# Patient Record
Sex: FEMALE | Race: WHITE | NOT HISPANIC OR LATINO | Employment: OTHER | URBAN - NONMETROPOLITAN AREA
[De-identification: names, ages, dates, MRNs, and addresses within clinical notes are randomized per-mention and may not be internally consistent; named-entity substitution may affect disease eponyms.]

---

## 2021-03-12 ENCOUNTER — APPOINTMENT (OUTPATIENT)
Dept: CT IMAGING | Facility: HOSPITAL | Age: 76
End: 2021-03-12

## 2021-03-12 ENCOUNTER — APPOINTMENT (OUTPATIENT)
Dept: GENERAL RADIOLOGY | Facility: HOSPITAL | Age: 76
End: 2021-03-12

## 2021-03-12 ENCOUNTER — HOSPITAL ENCOUNTER (INPATIENT)
Facility: HOSPITAL | Age: 76
LOS: 7 days | Discharge: NURSING, REHAB OR LONG-TERM CARE | End: 2021-03-19
Attending: EMERGENCY MEDICINE | Admitting: INTERNAL MEDICINE

## 2021-03-12 ENCOUNTER — APPOINTMENT (OUTPATIENT)
Dept: MRI IMAGING | Facility: HOSPITAL | Age: 76
End: 2021-03-12

## 2021-03-12 DIAGNOSIS — I63.9 CEREBROVASCULAR ACCIDENT (CVA), UNSPECIFIED MECHANISM (HCC): Primary | ICD-10-CM

## 2021-03-12 DIAGNOSIS — R13.12 OROPHARYNGEAL DYSPHAGIA: ICD-10-CM

## 2021-03-12 DIAGNOSIS — Z74.09 IMPAIRED MOBILITY AND ADLS: ICD-10-CM

## 2021-03-12 DIAGNOSIS — Z78.9 IMPAIRED MOBILITY AND ADLS: ICD-10-CM

## 2021-03-12 PROBLEM — E11.9 DM2 (DIABETES MELLITUS, TYPE 2) (HCC): Status: ACTIVE | Noted: 2021-03-12

## 2021-03-12 PROBLEM — E78.5 HYPERLIPIDEMIA: Status: ACTIVE | Noted: 2021-03-12

## 2021-03-12 PROBLEM — I10 BENIGN ESSENTIAL HTN: Status: ACTIVE | Noted: 2021-03-12

## 2021-03-12 PROBLEM — R41.82 ALTERED MENTAL STATUS: Status: ACTIVE | Noted: 2021-03-12

## 2021-03-12 LAB
ALBUMIN SERPL-MCNC: 2.9 G/DL (ref 3.5–5.2)
ALBUMIN/GLOB SERPL: 0.7 G/DL
ALP SERPL-CCNC: 107 U/L (ref 39–117)
ALT SERPL W P-5'-P-CCNC: 39 U/L (ref 1–33)
ANION GAP SERPL CALCULATED.3IONS-SCNC: 9 MMOL/L (ref 5–15)
APTT PPP: 39.4 SECONDS (ref 24.1–35)
AST SERPL-CCNC: 33 U/L (ref 1–32)
BACTERIA UR QL AUTO: ABNORMAL /HPF
BASOPHILS # BLD AUTO: 0.05 10*3/MM3 (ref 0–0.2)
BASOPHILS NFR BLD AUTO: 0.4 % (ref 0–1.5)
BILIRUB SERPL-MCNC: 0.4 MG/DL (ref 0–1.2)
BILIRUB UR QL STRIP: NEGATIVE
BUN SERPL-MCNC: 29 MG/DL (ref 8–23)
BUN/CREAT SERPL: 27.4 (ref 7–25)
CALCIUM SPEC-SCNC: 9.2 MG/DL (ref 8.6–10.5)
CHLORIDE SERPL-SCNC: 100 MMOL/L (ref 98–107)
CLARITY UR: CLEAR
CO2 SERPL-SCNC: 28 MMOL/L (ref 22–29)
COLOR UR: ABNORMAL
CREAT SERPL-MCNC: 1.06 MG/DL (ref 0.57–1)
D-LACTATE SERPL-SCNC: 2 MMOL/L (ref 0.5–2)
DEPRECATED RDW RBC AUTO: 43.8 FL (ref 37–54)
EOSINOPHIL # BLD AUTO: 0.12 10*3/MM3 (ref 0–0.4)
EOSINOPHIL NFR BLD AUTO: 1 % (ref 0.3–6.2)
ERYTHROCYTE [DISTWIDTH] IN BLOOD BY AUTOMATED COUNT: 13.5 % (ref 12.3–15.4)
GFR SERPL CREATININE-BSD FRML MDRD: 50 ML/MIN/1.73
GLOBULIN UR ELPH-MCNC: 4 GM/DL
GLUCOSE BLDC GLUCOMTR-MCNC: 270 MG/DL (ref 70–130)
GLUCOSE BLDC GLUCOMTR-MCNC: 382 MG/DL (ref 70–130)
GLUCOSE SERPL-MCNC: 335 MG/DL (ref 65–99)
GLUCOSE UR STRIP-MCNC: ABNORMAL MG/DL
HCT VFR BLD AUTO: 40.6 % (ref 34–46.6)
HGB BLD-MCNC: 12.9 G/DL (ref 12–15.9)
HGB UR QL STRIP.AUTO: NEGATIVE
HYALINE CASTS UR QL AUTO: ABNORMAL /LPF
IMM GRANULOCYTES # BLD AUTO: 0.2 10*3/MM3 (ref 0–0.05)
IMM GRANULOCYTES NFR BLD AUTO: 1.7 % (ref 0–0.5)
INR PPP: 1.06 (ref 0.91–1.09)
KETONES UR QL STRIP: NEGATIVE
LEUKOCYTE ESTERASE UR QL STRIP.AUTO: ABNORMAL
LYMPHOCYTES # BLD AUTO: 1.33 10*3/MM3 (ref 0.7–3.1)
LYMPHOCYTES NFR BLD AUTO: 11.3 % (ref 19.6–45.3)
MAGNESIUM SERPL-MCNC: 2.2 MG/DL (ref 1.6–2.4)
MCH RBC QN AUTO: 27.9 PG (ref 26.6–33)
MCHC RBC AUTO-ENTMCNC: 31.8 G/DL (ref 31.5–35.7)
MCV RBC AUTO: 87.9 FL (ref 79–97)
MONOCYTES # BLD AUTO: 0.7 10*3/MM3 (ref 0.1–0.9)
MONOCYTES NFR BLD AUTO: 6 % (ref 5–12)
NEUTROPHILS NFR BLD AUTO: 79.6 % (ref 42.7–76)
NEUTROPHILS NFR BLD AUTO: 9.33 10*3/MM3 (ref 1.7–7)
NITRITE UR QL STRIP: NEGATIVE
NRBC BLD AUTO-RTO: 0 /100 WBC (ref 0–0.2)
PH UR STRIP.AUTO: 5.5 [PH] (ref 5–8)
PLATELET # BLD AUTO: 349 10*3/MM3 (ref 140–450)
PMV BLD AUTO: 11.4 FL (ref 6–12)
POTASSIUM SERPL-SCNC: 5.2 MMOL/L (ref 3.5–5.2)
PROT SERPL-MCNC: 6.9 G/DL (ref 6–8.5)
PROT UR QL STRIP: ABNORMAL
PROTHROMBIN TIME: 13.4 SECONDS (ref 11.9–14.6)
RBC # BLD AUTO: 4.62 10*6/MM3 (ref 3.77–5.28)
RBC # UR: ABNORMAL /HPF
REF LAB TEST METHOD: ABNORMAL
SARS-COV-2 RNA PNL SPEC NAA+PROBE: NOT DETECTED
SODIUM SERPL-SCNC: 137 MMOL/L (ref 136–145)
SP GR UR STRIP: 1.02 (ref 1–1.03)
SQUAMOUS #/AREA URNS HPF: ABNORMAL /HPF
UROBILINOGEN UR QL STRIP: ABNORMAL
WBC # BLD AUTO: 11.73 10*3/MM3 (ref 3.4–10.8)
WBC UR QL AUTO: ABNORMAL /HPF

## 2021-03-12 PROCEDURE — 74018 RADEX ABDOMEN 1 VIEW: CPT

## 2021-03-12 PROCEDURE — P9612 CATHETERIZE FOR URINE SPEC: HCPCS

## 2021-03-12 PROCEDURE — 73060 X-RAY EXAM OF HUMERUS: CPT

## 2021-03-12 PROCEDURE — 70450 CT HEAD/BRAIN W/O DYE: CPT

## 2021-03-12 PROCEDURE — 93010 ELECTROCARDIOGRAM REPORT: CPT | Performed by: INTERNAL MEDICINE

## 2021-03-12 PROCEDURE — 83735 ASSAY OF MAGNESIUM: CPT | Performed by: EMERGENCY MEDICINE

## 2021-03-12 PROCEDURE — 83605 ASSAY OF LACTIC ACID: CPT | Performed by: EMERGENCY MEDICINE

## 2021-03-12 PROCEDURE — 87150 DNA/RNA AMPLIFIED PROBE: CPT | Performed by: EMERGENCY MEDICINE

## 2021-03-12 PROCEDURE — 87635 SARS-COV-2 COVID-19 AMP PRB: CPT | Performed by: EMERGENCY MEDICINE

## 2021-03-12 PROCEDURE — 73090 X-RAY EXAM OF FOREARM: CPT

## 2021-03-12 PROCEDURE — 70551 MRI BRAIN STEM W/O DYE: CPT

## 2021-03-12 PROCEDURE — 71045 X-RAY EXAM CHEST 1 VIEW: CPT

## 2021-03-12 PROCEDURE — 85025 COMPLETE CBC W/AUTO DIFF WBC: CPT | Performed by: EMERGENCY MEDICINE

## 2021-03-12 PROCEDURE — 99285 EMERGENCY DEPT VISIT HI MDM: CPT

## 2021-03-12 PROCEDURE — 80053 COMPREHEN METABOLIC PANEL: CPT | Performed by: EMERGENCY MEDICINE

## 2021-03-12 PROCEDURE — 63710000001 INSULIN DETEMIR PER 5 UNITS: Performed by: INTERNAL MEDICINE

## 2021-03-12 PROCEDURE — 36415 COLL VENOUS BLD VENIPUNCTURE: CPT

## 2021-03-12 PROCEDURE — 85730 THROMBOPLASTIN TIME PARTIAL: CPT | Performed by: EMERGENCY MEDICINE

## 2021-03-12 PROCEDURE — 87147 CULTURE TYPE IMMUNOLOGIC: CPT | Performed by: EMERGENCY MEDICINE

## 2021-03-12 PROCEDURE — 81001 URINALYSIS AUTO W/SCOPE: CPT | Performed by: EMERGENCY MEDICINE

## 2021-03-12 PROCEDURE — 87077 CULTURE AEROBIC IDENTIFY: CPT | Performed by: EMERGENCY MEDICINE

## 2021-03-12 PROCEDURE — 87040 BLOOD CULTURE FOR BACTERIA: CPT | Performed by: EMERGENCY MEDICINE

## 2021-03-12 PROCEDURE — 85610 PROTHROMBIN TIME: CPT | Performed by: EMERGENCY MEDICINE

## 2021-03-12 PROCEDURE — 93005 ELECTROCARDIOGRAM TRACING: CPT | Performed by: EMERGENCY MEDICINE

## 2021-03-12 PROCEDURE — 82962 GLUCOSE BLOOD TEST: CPT

## 2021-03-12 PROCEDURE — 63710000001 INSULIN REGULAR HUMAN PER 5 UNITS: Performed by: INTERNAL MEDICINE

## 2021-03-12 RX ORDER — SODIUM CHLORIDE 0.9 % (FLUSH) 0.9 %
10 SYRINGE (ML) INJECTION AS NEEDED
Status: DISCONTINUED | OUTPATIENT
Start: 2021-03-12 | End: 2021-03-19 | Stop reason: HOSPADM

## 2021-03-12 RX ORDER — SODIUM CHLORIDE 0.9 % (FLUSH) 0.9 %
10 SYRINGE (ML) INJECTION EVERY 12 HOURS SCHEDULED
Status: DISCONTINUED | OUTPATIENT
Start: 2021-03-12 | End: 2021-03-19 | Stop reason: HOSPADM

## 2021-03-12 RX ORDER — DEXTROSE MONOHYDRATE 25 G/50ML
25 INJECTION, SOLUTION INTRAVENOUS
Status: DISCONTINUED | OUTPATIENT
Start: 2021-03-12 | End: 2021-03-19 | Stop reason: HOSPADM

## 2021-03-12 RX ORDER — ONDANSETRON 2 MG/ML
4 INJECTION INTRAMUSCULAR; INTRAVENOUS EVERY 6 HOURS PRN
Status: DISCONTINUED | OUTPATIENT
Start: 2021-03-12 | End: 2021-03-19 | Stop reason: HOSPADM

## 2021-03-12 RX ORDER — NICOTINE POLACRILEX 4 MG
15 LOZENGE BUCCAL
Status: DISCONTINUED | OUTPATIENT
Start: 2021-03-12 | End: 2021-03-19 | Stop reason: HOSPADM

## 2021-03-12 RX ORDER — ACETAMINOPHEN 325 MG/1
650 TABLET ORAL EVERY 4 HOURS PRN
Status: DISCONTINUED | OUTPATIENT
Start: 2021-03-12 | End: 2021-03-19 | Stop reason: HOSPADM

## 2021-03-12 RX ORDER — SODIUM CHLORIDE 9 MG/ML
75 INJECTION, SOLUTION INTRAVENOUS CONTINUOUS
Status: DISCONTINUED | OUTPATIENT
Start: 2021-03-12 | End: 2021-03-14

## 2021-03-12 RX ORDER — LABETALOL HYDROCHLORIDE 5 MG/ML
20 INJECTION, SOLUTION INTRAVENOUS EVERY 4 HOURS PRN
Status: DISCONTINUED | OUTPATIENT
Start: 2021-03-12 | End: 2021-03-19 | Stop reason: HOSPADM

## 2021-03-12 RX ORDER — ACETAMINOPHEN 650 MG/1
650 SUPPOSITORY RECTAL EVERY 4 HOURS PRN
Status: DISCONTINUED | OUTPATIENT
Start: 2021-03-12 | End: 2021-03-19 | Stop reason: HOSPADM

## 2021-03-12 RX ORDER — ATORVASTATIN CALCIUM 40 MG/1
80 TABLET, FILM COATED ORAL NIGHTLY
Status: DISCONTINUED | OUTPATIENT
Start: 2021-03-12 | End: 2021-03-15

## 2021-03-12 RX ORDER — HYDRALAZINE HYDROCHLORIDE 20 MG/ML
5 INJECTION INTRAMUSCULAR; INTRAVENOUS EVERY 6 HOURS PRN
Status: DISCONTINUED | OUTPATIENT
Start: 2021-03-12 | End: 2021-03-19 | Stop reason: HOSPADM

## 2021-03-12 RX ADMIN — HUMAN INSULIN 7 UNITS: 100 INJECTION, SOLUTION SUBCUTANEOUS at 19:09

## 2021-03-12 RX ADMIN — LABETALOL HYDROCHLORIDE 20 MG: 5 INJECTION, SOLUTION INTRAVENOUS at 19:11

## 2021-03-12 RX ADMIN — INSULIN DETEMIR 10 UNITS: 100 INJECTION, SOLUTION SUBCUTANEOUS at 22:39

## 2021-03-12 RX ADMIN — ACETAMINOPHEN 650 MG: 650 SUPPOSITORY RECTAL at 22:59

## 2021-03-12 RX ADMIN — SODIUM CHLORIDE, PRESERVATIVE FREE 10 ML: 5 INJECTION INTRAVENOUS at 22:41

## 2021-03-12 RX ADMIN — SODIUM CHLORIDE 75 ML/HR: 9 INJECTION, SOLUTION INTRAVENOUS at 19:13

## 2021-03-12 NOTE — ED PROVIDER NOTES
Subjective   Information [facility] EMS [facility]  Patient is unable to give us any information because of her mental status change and acuity of condition.  Reportedly the patient was found at about noon with a right-sided paralysis and not talking well and unable to get around well.  They are not sure exactly when it started.  They tell us now that the last time she was known to be her normal self was sometime last night.  She was resubmitted to the nursing home on 3 9 of 21 from [facility] after diagnosis of CVA was there for rehab.  However these changes are new according to the them.  They sent here for further evaluation.  We do not know what residual deficit she had after the previous stroke.  We do not know if she had TPA for the stroke because we do not have those records at this time.      History provided by:  Nursing home and EMS personnel  History limited by:  Mental status change   used: No    Neurologic Problem  The patient's primary symptoms include an altered mental status, focal weakness and weakness. This is a new problem. The current episode started today. The neurological problem developed suddenly. The last time the patient was known to be well was 3/11/2021 8:00 PM.  The problem is unchanged. There was right-sided and upper extremity focality noted. Pertinent negatives include no abdominal pain, auditory change, aura, back pain, bladder incontinence, bowel incontinence, chest pain, confusion, diaphoresis, dizziness, fatigue, fever, headaches, light-headedness, nausea, neck pain, palpitations, shortness of breath, vertigo or vomiting. Past treatments include nothing. Her past medical history is significant for a CVA and dementia. There is no history of a bleeding disorder, a clotting disorder, head trauma, liver disease, mood changes or seizures.       Review of Systems   Constitutional: Negative for diaphoresis, fatigue and fever.   HENT:  Negative.    Respiratory: Negative.  Negative for shortness of breath.    Cardiovascular: Negative.  Negative for chest pain and palpitations.   Gastrointestinal: Negative.  Negative for abdominal pain, bowel incontinence, nausea and vomiting.   Genitourinary: Negative.  Negative for bladder incontinence.   Musculoskeletal: Negative.  Negative for back pain and neck pain.   Skin: Negative.    Neurological: Positive for focal weakness and weakness. Negative for dizziness, vertigo, light-headedness and headaches.   Psychiatric/Behavioral: Positive for decreased concentration. Negative for confusion.   All other systems reviewed and are negative.      Past Medical History:   Diagnosis Date   • Altered mental status    • Alzheimer's disease (CMS/[facility])    • Cerebral infarction (CMS/[facility])    • Dementia in other diseases classified elsewhere without behavioral disturbance (CMS/[facility])    • Gastro-esophageal reflux disease without esophagitis    • Hemiplegia and hemiparesis following cerebral infarction affecting right dominant side (CMS/[facility])    • Hyperkalemia    • Long term (current) use of insulin (CMS/[facility])    • Other recurrent depressive disorders (CMS/[facility])    • Other specified hypothyroidism    • Pain, unspecified    • Type 2 diabetes mellitus with hyperglycemia (CMS/[facility])        Allergies   Allergen Reactions   • Sulfa Antibiotics Unknown - High Severity   • Tetanus Toxoids Unknown - High Severity       History reviewed. No pertinent surgical history.    History reviewed. No pertinent family history.    Social History     Socioeconomic History   • Marital status:      Spouse name: Not on file   • Number of children: Not on file   • Years of education: Not on file   • Highest education level: Not on file   Tobacco Use   • Smoking status: Unknown If Ever Smoked   Substance and Sexual Activity   • Alcohol use: Not Currently   • Drug use: Not Currently   • Sexual activity: Defer       Prior to Admission medications    Not  on File       Medications   sodium chloride 0.9 % flush 10 mL (has no administration in time range)       Vitals:    03/12/21 1522   BP:    Pulse:    Resp:    Temp: 98.7 °F (37.1 °C)   SpO2:          Objective   Physical Exam  Vitals and nursing note reviewed.   Constitutional:       Appearance: Normal appearance. She is obese.   HENT:      Head: Normocephalic and atraumatic.      Nose: Nose normal.      Mouth/Throat:      Mouth: Mucous membranes are moist.      Pharynx: Oropharynx is clear.   Eyes:      Pupils: Pupils are equal, round, and reactive to light.      Comments: Eyes deviated to the left and the patient does not move them with exam.   Cardiovascular:      Rate and Rhythm: Normal rate and regular rhythm.   Pulmonary:      Effort: Pulmonary effort is normal.      Breath sounds: Normal breath sounds.   Abdominal:      General: Abdomen is flat.      Palpations: Abdomen is soft.   Musculoskeletal:         General: Normal range of motion.      Cervical back: Normal range of motion and neck supple.   Skin:     General: Skin is warm and dry.      Capillary Refill: Capillary refill takes less than 2 seconds.   Neurological:      Mental Status: She is disoriented.      Comments: Patient is flaccid in her right upper extremity.  I do not know if this is old or new.  She does keep her eyes deviating to the left.  She is not combative but does not cooperate with exam.   Psychiatric:         Mood and Affect: Mood normal.         Behavior: Behavior normal.         Procedures         Lab Results (last 24 hours)     Procedure Component Value Units Date/Time    CBC & Differential [[id number]]  (Abnormal) Collected: 03/12/21 1410    Specimen: Blood Updated: 03/12/21 1445    Narrative:      The following orders were created for panel order CBC & Differential.  Procedure                               Abnormality         Status                     ---------                               -----------         ------                      CBC Auto Differential[[id number]]        Abnormal            Final result                 Please view results for these tests on the individual orders.    Comprehensive Metabolic Panel [[id number]]  (Abnormal) Collected: 03/12/21 1410    Specimen: Blood Updated: 03/12/21 1505     Glucose 335 mg/dL      BUN 29 mg/dL      Creatinine 1.06 mg/dL      Sodium 137 mmol/L      Potassium 5.2 mmol/L      Chloride 100 mmol/L      CO2 28.0 mmol/L      Calcium 9.2 mg/dL      Total Protein 6.9 g/dL      Albumin 2.90 g/dL      ALT (SGPT) 39 U/L      AST (SGOT) 33 U/L      Alkaline Phosphatase 107 U/L      Total Bilirubin 0.4 mg/dL      eGFR Non African Amer 50 mL/min/1.73      Globulin 4.0 gm/dL      A/G Ratio 0.7 g/dL      BUN/Creatinine Ratio 27.4     Anion Gap 9.0 mmol/L     Narrative:      GFR Normal >60  Chronic Kidney Disease <60  Kidney Failure <15      aPTT [[id number]]  (Abnormal) Collected: 03/12/21 1410    Specimen: Blood Updated: 03/12/21 1455     PTT 39.4 seconds     Protime-INR [[id number]]  (Normal) Collected: 03/12/21 1410    Specimen: Blood Updated: 03/12/21 1455     Protime 13.4 Seconds      INR 1.06    Magnesium [[id number]]  (Normal) Collected: 03/12/21 1410    Specimen: Blood Updated: 03/12/21 1500     Magnesium 2.2 mg/dL     CBC Auto Differential [[id number]]  (Abnormal) Collected: 03/12/21 1410    Specimen: Blood Updated: 03/12/21 1445     WBC 11.73 10*3/mm3      RBC 4.62 10*6/mm3      Hemoglobin 12.9 g/dL      Hematocrit 40.6 %      MCV 87.9 fL      MCH 27.9 pg      MCHC 31.8 g/dL      RDW 13.5 %      RDW-SD 43.8 fl      MPV 11.4 fL      Platelets 349 10*3/mm3      Neutrophil % 79.6 %      Lymphocyte % 11.3 %      Monocyte % 6.0 %      Eosinophil % 1.0 %      Basophil % 0.4 %      Immature Grans % 1.7 %      Neutrophils, Absolute 9.33 10*3/mm3      Lymphocytes, Absolute 1.33 10*3/mm3      Monocytes, Absolute 0.70 10*3/mm3      Eosinophils, Absolute 0.12 10*3/mm3      Basophils, Absolute 0.05 10*3/mm3       Immature Grans, Absolute 0.20 10*3/mm3      nRBC 0.0 /100 WBC     Blood Culture - Blood, Arm, Left [[id number]] Collected: 03/12/21 1414    Specimen: Blood from Arm, Left Updated: 03/12/21 1451    Lactic Acid, Plasma [[id number]]  (Normal) Collected: 03/12/21 1432    Specimen: Blood Updated: 03/12/21 1501     Lactate 2.0 mmol/L     Urinalysis With Culture If Indicated - Urine, Catheter In/Out [[id number]]  (Abnormal) Collected: 03/12/21 1433    Specimen: Urine, Catheter In/Out Updated: 03/12/21 1447     Color, UA Dark Yellow     Appearance, UA Clear     pH, UA 5.5     Specific Gravity, UA 1.024     Glucose,  mg/dL (1+)     Ketones, UA Negative     Bilirubin, UA Negative     Blood, UA Negative     Protein, UA 30 mg/dL (1+)     Leuk Esterase, UA Trace     Nitrite, UA Negative     Urobilinogen, UA 1.0 E.U./dL    Urinalysis, Microscopic Only - Urine, Catheter In/Out [[id number]]  (Abnormal) Collected: 03/12/21 1433    Specimen: Urine, Catheter In/Out Updated: 03/12/21 1447     RBC, UA 3-5 /HPF      WBC, UA 3-5 /HPF      Bacteria, UA None Seen /HPF      Squamous Epithelial Cells, UA 0-2 /HPF      Hyaline Casts, UA 3-6 /LPF      Methodology Automated Microscopy    Blood Culture - Blood, Arm, Right [[id number]] Collected: 03/12/21 1434    Specimen: Blood from Arm, Right Updated: 03/12/21 1451          XR Forearm 2 View Left   Final Result   1. Nonspecific soft tissue swelling along the more proximal aspect of   the dorsal forearm. No underlying bony abnormality.   2. 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       This report was finalized on 03/12/2021 15:45 by Dr Vinod Conner, .      XR Humerus Left   Final Result   1. Left shoulder arthroplasty. No hardware complication. No acute   fracture.   2. 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       This report was finalized on 03/12/2021 15:44 by Dr Vinod Conner, .      XR Chest 1 View   Final Result   1. No radiographic evidence of acute cardiopulmonary  process.           This report was finalized on 03/12/2021 15:39 by Dr Vinod Conner, .      CT Head Without Contrast   Final Result   Impression:       1.  No acute intracranial findings.   2.  Chronic microvascular ischemic white matter change and old appearing   LEFT caudate lacunar infarct.   3.  Recommend brain MRI if there is clinical concern for acute ischemia.   4.  Small mastoid effusions.   This report was finalized on 03/12/2021 15:45 by Dr. Rogerio Jaimes MD.      MRI Brain Without Contrast    (Results Pending)       ED Course  ED Course as of Mar 12 1627   Fri Mar 12, 2021   1626 Patient CT is normal with the patient does have a marked change in her status according to the report from the nursing home.  Accordingly we will admit.  I have ordered an MRI.  I have spoken with Dr. Burgos.    [TR]      ED Course User Index  [TR] Jorje Dawn Jr., MD          MDM  Number of Diagnoses or Management Options  Cerebrovascular accident (CVA), unspecified mechanism (CMS/HCC): new and requires workup     Amount and/or Complexity of Data Reviewed  Clinical lab tests: ordered and reviewed  Tests in the radiology section of CPT®: ordered and reviewed  Tests in the medicine section of CPT®: ordered and reviewed    Risk of Complications, Morbidity, and/or Mortality  Presenting problems: moderate  Diagnostic procedures: moderate  Management options: moderate    Patient Progress  Patient progress: stable      Final diagnoses:   Cerebrovascular accident (CVA), unspecified mechanism (CMS/HCC)          Jorje Dawn Jr., MD  03/12/21 1627

## 2021-03-13 ENCOUNTER — APPOINTMENT (OUTPATIENT)
Dept: CARDIOLOGY | Facility: HOSPITAL | Age: 76
End: 2021-03-13

## 2021-03-13 LAB
ALBUMIN SERPL-MCNC: 2.6 G/DL (ref 3.5–5.2)
ALBUMIN/GLOB SERPL: 0.7 G/DL
ALP SERPL-CCNC: 92 U/L (ref 39–117)
ALT SERPL W P-5'-P-CCNC: 33 U/L (ref 1–33)
ANION GAP SERPL CALCULATED.3IONS-SCNC: 10 MMOL/L (ref 5–15)
AST SERPL-CCNC: 22 U/L (ref 1–32)
BH CV ECHO MEAS - AO MAX PG (FULL): 1.3 MMHG
BH CV ECHO MEAS - AO MAX PG: 6.1 MMHG
BH CV ECHO MEAS - AO MEAN PG (FULL): 2 MMHG
BH CV ECHO MEAS - AO MEAN PG: 4 MMHG
BH CV ECHO MEAS - AO ROOT AREA (BSA CORRECTED): 1.8
BH CV ECHO MEAS - AO ROOT AREA: 10.8 CM^2
BH CV ECHO MEAS - AO ROOT DIAM: 3.7 CM
BH CV ECHO MEAS - AO V2 MAX: 123 CM/SEC
BH CV ECHO MEAS - AO V2 MEAN: 92 CM/SEC
BH CV ECHO MEAS - AO V2 VTI: 24.2 CM
BH CV ECHO MEAS - AVA(I,A): 2.5 CM^2
BH CV ECHO MEAS - AVA(I,D): 2.5 CM^2
BH CV ECHO MEAS - AVA(V,A): 3.4 CM^2
BH CV ECHO MEAS - AVA(V,D): 3.4 CM^2
BH CV ECHO MEAS - BSA(HAYCOCK): 2.2 M^2
BH CV ECHO MEAS - BSA: 2.1 M^2
BH CV ECHO MEAS - BZI_BMI: 32.2 KILOGRAMS/M^2
BH CV ECHO MEAS - BZI_METRIC_HEIGHT: 172.7 CM
BH CV ECHO MEAS - BZI_METRIC_WEIGHT: 96.2 KG
BH CV ECHO MEAS - EDV(CUBED): 82.3 ML
BH CV ECHO MEAS - EDV(MOD-SP4): 95.6 ML
BH CV ECHO MEAS - EDV(TEICH): 85.4 ML
BH CV ECHO MEAS - EF(CUBED): 88.4 %
BH CV ECHO MEAS - EF(MOD-SP4): 78 %
BH CV ECHO MEAS - EF(TEICH): 82.7 %
BH CV ECHO MEAS - ESV(CUBED): 9.5 ML
BH CV ECHO MEAS - ESV(MOD-SP4): 21 ML
BH CV ECHO MEAS - ESV(TEICH): 14.8 ML
BH CV ECHO MEAS - FS: 51.3 %
BH CV ECHO MEAS - IVS/LVPW: 1.4
BH CV ECHO MEAS - IVSD: 1.1 CM
BH CV ECHO MEAS - LA DIMENSION: 4 CM
BH CV ECHO MEAS - LA/AO: 1.1
BH CV ECHO MEAS - LAT PEAK E' VEL: 9.4 CM/SEC
BH CV ECHO MEAS - LV DIASTOLIC VOL/BSA (35-75): 45.6 ML/M^2
BH CV ECHO MEAS - LV MASS(C)D: 128.4 GRAMS
BH CV ECHO MEAS - LV MASS(C)DI: 61.3 GRAMS/M^2
BH CV ECHO MEAS - LV MAX PG: 4.8 MMHG
BH CV ECHO MEAS - LV MEAN PG: 2 MMHG
BH CV ECHO MEAS - LV SYSTOLIC VOL/BSA (12-30): 10 ML/M^2
BH CV ECHO MEAS - LV V1 MAX: 109 CM/SEC
BH CV ECHO MEAS - LV V1 MEAN: 66.3 CM/SEC
BH CV ECHO MEAS - LV V1 VTI: 16 CM
BH CV ECHO MEAS - LVIDD: 4.4 CM
BH CV ECHO MEAS - LVIDS: 2.1 CM
BH CV ECHO MEAS - LVLD AP4: 8.1 CM
BH CV ECHO MEAS - LVLS AP4: 6.1 CM
BH CV ECHO MEAS - LVOT AREA (M): 3.8 CM^2
BH CV ECHO MEAS - LVOT AREA: 3.8 CM^2
BH CV ECHO MEAS - LVOT DIAM: 2.2 CM
BH CV ECHO MEAS - LVPWD: 0.75 CM
BH CV ECHO MEAS - MED PEAK E' VEL: 6.96 CM/SEC
BH CV ECHO MEAS - MV A MAX VEL: 116 CM/SEC
BH CV ECHO MEAS - MV DEC SLOPE: 311 CM/SEC^2
BH CV ECHO MEAS - MV DEC TIME: 0.21 SEC
BH CV ECHO MEAS - MV E MAX VEL: 66.5 CM/SEC
BH CV ECHO MEAS - MV E/A: 0.57
BH CV ECHO MEAS - SI(AO): 124.2 ML/M^2
BH CV ECHO MEAS - SI(CUBED): 34.7 ML/M^2
BH CV ECHO MEAS - SI(LVOT): 29 ML/M^2
BH CV ECHO MEAS - SI(MOD-SP4): 35.6 ML/M^2
BH CV ECHO MEAS - SI(TEICH): 33.7 ML/M^2
BH CV ECHO MEAS - SV(AO): 260.2 ML
BH CV ECHO MEAS - SV(CUBED): 72.8 ML
BH CV ECHO MEAS - SV(LVOT): 60.8 ML
BH CV ECHO MEAS - SV(MOD-SP4): 74.6 ML
BH CV ECHO MEAS - SV(TEICH): 70.6 ML
BH CV ECHO MEASUREMENTS AVERAGE E/E' RATIO: 8.13
BILIRUB SERPL-MCNC: 0.4 MG/DL (ref 0–1.2)
BUN SERPL-MCNC: 26 MG/DL (ref 8–23)
BUN/CREAT SERPL: 28.6 (ref 7–25)
CALCIUM SPEC-SCNC: 8.6 MG/DL (ref 8.6–10.5)
CHLORIDE SERPL-SCNC: 100 MMOL/L (ref 98–107)
CHOLEST SERPL-MCNC: 114 MG/DL (ref 0–200)
CO2 SERPL-SCNC: 26 MMOL/L (ref 22–29)
CREAT SERPL-MCNC: 0.91 MG/DL (ref 0.57–1)
DEPRECATED RDW RBC AUTO: 41.6 FL (ref 37–54)
ERYTHROCYTE [DISTWIDTH] IN BLOOD BY AUTOMATED COUNT: 13.2 % (ref 12.3–15.4)
GFR SERPL CREATININE-BSD FRML MDRD: 60 ML/MIN/1.73
GLOBULIN UR ELPH-MCNC: 3.6 GM/DL
GLUCOSE BLDC GLUCOMTR-MCNC: 249 MG/DL (ref 70–130)
GLUCOSE BLDC GLUCOMTR-MCNC: 261 MG/DL (ref 70–130)
GLUCOSE BLDC GLUCOMTR-MCNC: 316 MG/DL (ref 70–130)
GLUCOSE SERPL-MCNC: 341 MG/DL (ref 65–99)
HBA1C MFR BLD: 10.6 % (ref 4.8–5.6)
HCT VFR BLD AUTO: 35.5 % (ref 34–46.6)
HDLC SERPL-MCNC: 26 MG/DL (ref 40–60)
HGB BLD-MCNC: 11.5 G/DL (ref 12–15.9)
LDLC SERPL CALC-MCNC: 73 MG/DL (ref 0–100)
LDLC/HDLC SERPL: 2.82 {RATIO}
LEFT ATRIUM VOLUME INDEX: 36 ML/M2
LEFT ATRIUM VOLUME: 75.6 CM3
MAXIMAL PREDICTED HEART RATE: 143 BPM
MCH RBC QN AUTO: 27.8 PG (ref 26.6–33)
MCHC RBC AUTO-ENTMCNC: 32.4 G/DL (ref 31.5–35.7)
MCV RBC AUTO: 86 FL (ref 79–97)
PLATELET # BLD AUTO: 311 10*3/MM3 (ref 140–450)
PMV BLD AUTO: 11.1 FL (ref 6–12)
POTASSIUM SERPL-SCNC: 4.5 MMOL/L (ref 3.5–5.2)
PROT SERPL-MCNC: 6.2 G/DL (ref 6–8.5)
RBC # BLD AUTO: 4.13 10*6/MM3 (ref 3.77–5.28)
SODIUM SERPL-SCNC: 136 MMOL/L (ref 136–145)
STRESS TARGET HR: 122 BPM
TRIGL SERPL-MCNC: 74 MG/DL (ref 0–150)
VLDLC SERPL-MCNC: 15 MG/DL (ref 5–40)
WBC # BLD AUTO: 10.01 10*3/MM3 (ref 3.4–10.8)

## 2021-03-13 PROCEDURE — 92610 EVALUATE SWALLOWING FUNCTION: CPT | Performed by: SPEECH-LANGUAGE PATHOLOGIST

## 2021-03-13 PROCEDURE — 80053 COMPREHEN METABOLIC PANEL: CPT | Performed by: INTERNAL MEDICINE

## 2021-03-13 PROCEDURE — 63710000001 INSULIN REGULAR HUMAN PER 5 UNITS: Performed by: INTERNAL MEDICINE

## 2021-03-13 PROCEDURE — 63710000001 INSULIN DETEMIR PER 5 UNITS: Performed by: INTERNAL MEDICINE

## 2021-03-13 PROCEDURE — 36415 COLL VENOUS BLD VENIPUNCTURE: CPT | Performed by: INTERNAL MEDICINE

## 2021-03-13 PROCEDURE — 93306 TTE W/DOPPLER COMPLETE: CPT | Performed by: INTERNAL MEDICINE

## 2021-03-13 PROCEDURE — 93306 TTE W/DOPPLER COMPLETE: CPT

## 2021-03-13 PROCEDURE — 97162 PT EVAL MOD COMPLEX 30 MIN: CPT | Performed by: PHYSICAL THERAPIST

## 2021-03-13 PROCEDURE — 97166 OT EVAL MOD COMPLEX 45 MIN: CPT | Performed by: OCCUPATIONAL THERAPIST

## 2021-03-13 PROCEDURE — 25010000002 HYDRALAZINE PER 20 MG: Performed by: INTERNAL MEDICINE

## 2021-03-13 PROCEDURE — 25010000002 VANCOMYCIN: Performed by: INTERNAL MEDICINE

## 2021-03-13 PROCEDURE — 25010000002 PERFLUTREN 6.52 MG/ML SUSPENSION: Performed by: INTERNAL MEDICINE

## 2021-03-13 PROCEDURE — 80061 LIPID PANEL: CPT | Performed by: INTERNAL MEDICINE

## 2021-03-13 PROCEDURE — 25010000002 CEFTRIAXONE PER 250 MG: Performed by: INTERNAL MEDICINE

## 2021-03-13 PROCEDURE — 99223 1ST HOSP IP/OBS HIGH 75: CPT | Performed by: PSYCHIATRY & NEUROLOGY

## 2021-03-13 PROCEDURE — 83036 HEMOGLOBIN GLYCOSYLATED A1C: CPT | Performed by: INTERNAL MEDICINE

## 2021-03-13 PROCEDURE — 82962 GLUCOSE BLOOD TEST: CPT

## 2021-03-13 PROCEDURE — 85027 COMPLETE CBC AUTOMATED: CPT | Performed by: INTERNAL MEDICINE

## 2021-03-13 RX ORDER — TRAZODONE HYDROCHLORIDE 50 MG/1
50 TABLET ORAL NIGHTLY
COMMUNITY
End: 2021-03-19 | Stop reason: HOSPADM

## 2021-03-13 RX ORDER — LOVASTATIN 40 MG/1
40 TABLET ORAL 2 TIMES DAILY
COMMUNITY
End: 2021-03-19 | Stop reason: HOSPADM

## 2021-03-13 RX ORDER — LEVOTHYROXINE SODIUM 0.07 MG/1
75 TABLET ORAL
Status: DISCONTINUED | OUTPATIENT
Start: 2021-03-14 | End: 2021-03-19 | Stop reason: HOSPADM

## 2021-03-13 RX ORDER — ASPIRIN 81 MG/1
81 TABLET, CHEWABLE ORAL DAILY
COMMUNITY
End: 2021-03-19 | Stop reason: HOSPADM

## 2021-03-13 RX ORDER — DONEPEZIL HYDROCHLORIDE 5 MG/1
5 TABLET, FILM COATED ORAL NIGHTLY
Status: DISCONTINUED | OUTPATIENT
Start: 2021-03-13 | End: 2021-03-19 | Stop reason: HOSPADM

## 2021-03-13 RX ORDER — ASPIRIN 81 MG/1
81 TABLET ORAL DAILY
Status: DISCONTINUED | OUTPATIENT
Start: 2021-03-13 | End: 2021-03-13

## 2021-03-13 RX ORDER — LEVOTHYROXINE SODIUM 0.07 MG/1
75 TABLET ORAL DAILY
COMMUNITY

## 2021-03-13 RX ORDER — ASPIRIN 300 MG/1
300 SUPPOSITORY RECTAL DAILY
Status: DISCONTINUED | OUTPATIENT
Start: 2021-03-13 | End: 2021-03-19

## 2021-03-13 RX ORDER — POTASSIUM CHLORIDE 20 MEQ/1
20 TABLET, EXTENDED RELEASE ORAL 2 TIMES DAILY
COMMUNITY
End: 2021-03-19 | Stop reason: HOSPADM

## 2021-03-13 RX ORDER — PANTOPRAZOLE SODIUM 40 MG/1
40 TABLET, DELAYED RELEASE ORAL DAILY
COMMUNITY

## 2021-03-13 RX ORDER — ASPIRIN 300 MG/1
300 SUPPOSITORY RECTAL DAILY
Status: DISCONTINUED | OUTPATIENT
Start: 2021-03-13 | End: 2021-03-13

## 2021-03-13 RX ORDER — LOSARTAN POTASSIUM 50 MG/1
100 TABLET ORAL DAILY
COMMUNITY

## 2021-03-13 RX ORDER — OXYCODONE HYDROCHLORIDE 5 MG/1
5 CAPSULE ORAL 3 TIMES DAILY
COMMUNITY
End: 2021-03-19 | Stop reason: HOSPADM

## 2021-03-13 RX ORDER — DONEPEZIL HYDROCHLORIDE 5 MG/1
5 TABLET, FILM COATED ORAL NIGHTLY
COMMUNITY

## 2021-03-13 RX ORDER — ASPIRIN 81 MG/1
81 TABLET, CHEWABLE ORAL DAILY
Status: DISCONTINUED | OUTPATIENT
Start: 2021-03-13 | End: 2021-03-19

## 2021-03-13 RX ORDER — CEPHALEXIN 500 MG/1
500 CAPSULE ORAL 3 TIMES DAILY
COMMUNITY
End: 2021-03-19 | Stop reason: HOSPADM

## 2021-03-13 RX ORDER — SUCRALFATE 1 G/1
1 TABLET ORAL 4 TIMES DAILY
COMMUNITY

## 2021-03-13 RX ADMIN — SODIUM CHLORIDE, PRESERVATIVE FREE 10 ML: 5 INJECTION INTRAVENOUS at 20:12

## 2021-03-13 RX ADMIN — HUMAN INSULIN 4 UNITS: 100 INJECTION, SOLUTION SUBCUTANEOUS at 06:26

## 2021-03-13 RX ADMIN — SODIUM CHLORIDE 75 ML/HR: 9 INJECTION, SOLUTION INTRAVENOUS at 18:49

## 2021-03-13 RX ADMIN — ASPIRIN 300 MG: 300 SUPPOSITORY RECTAL at 16:59

## 2021-03-13 RX ADMIN — SODIUM CHLORIDE 75 ML/HR: 9 INJECTION, SOLUTION INTRAVENOUS at 03:41

## 2021-03-13 RX ADMIN — INSULIN DETEMIR 20 UNITS: 100 INJECTION, SOLUTION SUBCUTANEOUS at 20:10

## 2021-03-13 RX ADMIN — SODIUM CHLORIDE, PRESERVATIVE FREE 10 ML: 5 INJECTION INTRAVENOUS at 20:11

## 2021-03-13 RX ADMIN — HYDRALAZINE HYDROCHLORIDE 5 MG: 20 INJECTION INTRAMUSCULAR; INTRAVENOUS at 03:36

## 2021-03-13 RX ADMIN — HUMAN INSULIN 6 UNITS: 100 INJECTION, SOLUTION SUBCUTANEOUS at 17:09

## 2021-03-13 RX ADMIN — VANCOMYCIN HYDROCHLORIDE 1750 MG: 1 INJECTION, POWDER, LYOPHILIZED, FOR SOLUTION INTRAVENOUS at 17:00

## 2021-03-13 RX ADMIN — PERFLUTREN 8.48 MG: 6.52 INJECTION, SUSPENSION INTRAVENOUS at 08:40

## 2021-03-13 RX ADMIN — ACETAMINOPHEN 650 MG: 650 SUPPOSITORY RECTAL at 03:37

## 2021-03-13 RX ADMIN — HUMAN INSULIN 7 UNITS: 100 INJECTION, SOLUTION SUBCUTANEOUS at 01:43

## 2021-03-13 RX ADMIN — CEFTRIAXONE SODIUM 1 G: 1 INJECTION, POWDER, FOR SOLUTION INTRAMUSCULAR; INTRAVENOUS at 04:46

## 2021-03-13 NOTE — PROGRESS NOTES
"Pharmacy Dosing Service  Pharmacokinetics  Vancomycin Initial Evaluation    Assessment/Action/Plan:  Pharmacy consulted to dose Vancomycin for bacteremia. Initiated 1750mg IV once, followed by 1500mg IV Q24h.     AUC Model Data:  Loading dose: 1750mg  Regimen: 1500 mg every 24 hours   Exposure target: AUC24 (range)400-600 mg/L.hr  AUC24,ss: 446 mg/L.hr  PAUC*: 63 %  Ctrough,ss: 13.2 mg/L  Pconc*: 15 %  Tox.: 8 %    Pharmacy will continue to follow daily and adjust dose accordingly.     Subjective:  Val Gonzalez is a 76 y.o. female with a \"Pharmacy to Dose\" consult for the treatment of bacteremia. Current end date: 3-17-21    Objective:  Ht: 172.7 cm (67.99\"); Wt: 96.5 kg (212 lb 11.9 oz)  Estimated Creatinine Clearance: 62.9 mL/min (by C-G formula based on SCr of 0.91 mg/dL).     Creatinine   Date Value Ref Range Status   03/13/2021 0.91 0.57 - 1.00 mg/dL Final   03/12/2021 1.06 (H) 0.57 - 1.00 mg/dL Final   04/26/2019 0.6 0.5 - 1.1 mg/dL Final   04/25/2019 0.6 0.5 - 1.1 mg/dL Final   04/24/2019 0.9 0.5 - 1.1 mg/dL Final      Lab Results   Component Value Date    WBC 10.01 03/13/2021    WBC 11.73 (H) 03/12/2021    WBC 7.0 04/24/2019         Culture Results:  Microbiology Results (last 10 days)       Procedure Component Value - Date/Time    COVID PRE-OP / PRE-PROCEDURE SCREENING ORDER (NO ISOLATION) - Swab, Nasal Cavity [[id number]]  (Normal) Collected: 03/12/21 1638    Lab Status: Final result Specimen: Swab from Nasal Cavity Updated: 03/12/21 1806    Narrative:      The following orders were created for panel order COVID PRE-OP / PRE-PROCEDURE SCREENING ORDER (NO ISOLATION) - Swab, Nasal Cavity.  Procedure                               Abnormality         Status                     ---------                               -----------         ------                     COVID-19,[facility] IN-[facility]...[[id number]]  Normal              Final result                 Please view results for these tests on the individual " orders.    COVID-19,[facility] IN-HOUSE,Nasal Swab No Transport Media 3-4 HR TAT - Swab, Nasal Cavity [[id number]]  (Normal) Collected: 03/12/21 1638    Lab Status: Final result Specimen: Swab from Nasal Cavity Updated: 03/12/21 1806     COVID19 Not Detected    Narrative:      Fact sheet for providers: https://www.fda.gov/media/781567/download     Fact sheet for patients: https://www.fda.gov/media/060674/download    Test performed by PCR.    Consider negative results in combination with clinical observations, patient history, and epidemiological information.    Blood Culture - Blood, Arm, Left [[id number]]  (Abnormal) Collected: 03/12/21 1414    Lab Status: Preliminary result Specimen: Blood from Arm, Left Updated: 03/13/21 1050     Blood Culture Abnormal Stain     Gram Stain Anaerobic Bottle Gram positive cocci    Blood Culture ID, PCR - Blood, Arm, Left [[id number]]  (Abnormal) Collected: 03/12/21 1414    Lab Status: Final result Specimen: Blood from Arm, Left Updated: 03/13/21 1227     BCID, PCR Staphylococcus aureus. Identification by BCID PCR.            Ace Jenkins, Dianna   03/13/21 13:17 CST

## 2021-03-13 NOTE — THERAPY DISCHARGE NOTE
Patient Name: Val Gonzalez  : 1945    MRN: [id number]                              Today's Date: 3/13/2021       Admit Date: 3/12/2021    Visit Dx:     ICD-10-CM ICD-9-CM   1. Cerebrovascular accident (CVA), unspecified mechanism (CMS/HCC)  I63.9 434.91   2. Oropharyngeal dysphagia  R13.12 787.22   3. Impaired mobility and ADLs  Z74.09 V49.89    Z78.9      Patient Active Problem List   Diagnosis   • Cerebrovascular accident (CVA) (CMS/HCC)   • Acute CVA (cerebrovascular accident) (CMS/HCC)   • Benign essential HTN   • Hyperlipidemia   • DM2 (diabetes mellitus, type 2) (CMS/[facility])   • Altered mental status     Past Medical History:   Diagnosis Date   • Altered mental status    • Alzheimer's disease (CMS/HCC)    • Arthritis    • Cerebral infarction (CMS/HCC)    • Dementia in other diseases classified elsewhere without behavioral disturbance (CMS/[facility])    • Elevated cholesterol    • Gastro-esophageal reflux disease without esophagitis    • Hemiplegia and hemiparesis following cerebral infarction affecting right dominant side (CMS/[facility])    • Hyperkalemia    • Hyperlipidemia    • Hypertension    • Long term (current) use of insulin (CMS/HCC)    • Other recurrent depressive disorders (CMS/[facility])    • Other specified hypothyroidism    • Pain, unspecified    • Stroke (CMS/[facility])    • Type 2 diabetes mellitus with hyperglycemia (CMS/[facility])      Past Surgical History:   Procedure Laterality Date   • BACK SURGERY       General Information     Row Name 21 1057          OT Time and Intention    Document Type  evaluation  -MM (r) HH (t) MM (c)     Mode of Treatment  occupational therapy  -MM (r) HH (t) MM (c)     Row Name 21 1056          General Information    Patient Profile Reviewed  yes  -MM (r) HH (t) MM (c)     Prior Level of Function  -- Unable to obtain PLOF d/t pt non verbal at this time  -MM (r) HH (t) MM (c)     Existing Precautions/Restrictions  fall  -MM (r) HH (t) MM (c)     Barriers to Rehab  physical  barrier;previous functional deficit;cognitive status  -MM (r) HH (t) MM (c)     Row Name 03/13/21 1057          Occupational Profile    Occupational History/Life Experiences (Occupational Profile)  d/c to SNF from outside facility with previous CVA (3/9), admitted with stroke symptoms on 3/12. MRI reveals multifocal areas of acute infarct in the left frontal-parietal regionpredominantly in a left MCA distribution, Chronic infarct in the right occipital lobe, Areas of T2 high signal in the hemispheric white matter and ponswithout diffusion signal abnormality most likely due to chronic smallvessel disease, moderate atrophy with associated ventricular prominence. HO: diabetes, hypertension, hyperlipidemia, and dementia.  -MM (r) HH (t) MM (c)     Environmental Supports and Barriers (Occupational Profile)  Unable to obtain environmental supports/barriers d/t decreased communication at this time  -MM (r) HH (t) MM (c)     Row Name 03/13/21 1057          Living Environment    Lives With  facility resident  -MM (r) HH (t) MM (c)     Row Name 03/13/21 1057          Stairs Within Home, Primary    Stairs, Within Home, Primary  unable to obtain  -MM (r) HH (t) MM (c)     Stairs Comment, Within Home, Primary  unable to obtain  -MM (r) HH (t) MM (c)     Row Name 03/13/21 1057          Cognition    Orientation Status (Cognition)  disoriented to;person;place;situation;time  -MM (r) HH (t) MM (c)     Row Name 03/13/21 1057          Safety Issues, Functional Mobility    Impairments Affecting Function (Mobility)  cognition;motor control;coordination;grasp;muscle tone abnormal;strength;range of motion (ROM)  -MM (r) HH (t) MM (c)     Cognitive Impairments, Mobility Safety/Performance  other (see comments) Pt following <10% of commands at this time.  -MM (r) HH (t) MM (c)       User Key  (r) = Recorded By, (t) = Taken By, (c) = Cosigned By    Initials Name Provider Type    Zaki Suarez, OTR/L Occupational Therapist    ASH Golden  Yenny, OT Student OT Student          Mobility/ADL's     Row Name 03/13/21 1057          Bed Mobility    Bed Mobility  scooting/bridging  -MM (r) HH (t) MM (c)     Scooting/Bridging [place] (Bed Mobility)  dependent (less than 25% patient effort);2 person assist  -MM (r) HH (t) MM (c)     Bed Mobility, Safety Issues  decreased use of arms for pushing/pulling;decreased use of legs for bridging/pushing  -MM (r) HH (t) MM (c)     Assistive Device (Bed Mobility)  draw sheet  -MM (r) HH (t) MM (c)     Comment (Bed Mobility)  No further bed mobility assessed at this time d/t decreased command following  -MM (r) HH (t) MM (c)     Row Name 03/13/21 1057          Activities of Daily Living    BADL Assessment/Intervention  lower body dressing  -MM (r) HH (t) MM (c)     Row Name 03/13/21 1057          Lower Body Dressing Assessment/Training    [place] Level (Lower Body Dressing)  dependent (less than 25% patient effort)  -MM (r) HH (t) MM (c)     Position (Lower Body Dressing)  supine  -MM (r) HH (t) MM (c)     Comment (Lower Body Dressing)  anticipated level of assist dependent d/t decreased command following  -MM (r) HH (t) MM (c)       User Key  (r) = Recorded By, (t) = Taken By, (c) = Cosigned By    Initials Name Provider Type    MM Zaki Khan, OTR/L Occupational Therapist    Yenny Mcduffie, OT Student OT Student        Obj/Interventions     Row Name 03/13/21 1057          Sensory Interventions    Comment, Sensory Intervention  sensation difficult to assess; Pt responds to painful stimuli on BUE  -MM (r) HH (t) MM (c)     Row Name 03/13/21 1057          Range of Motion Comprehensive    General Range of Motion  upper extremity range of motion deficits identified  -MM (r) HH (t) MM (c)     Comment, General Range of Motion  AROM LUE elbow flex/extension WFL, AAROM LUE shoulder flexion <90 degrees. RUE PROM shoulder flexion <90, RUE PROM elbow flex/ext WFL.  -MM (r) HH (t) MM (c)     Row Name 03/13/21 1057           Strength Comprehensive (MMT)    Comment, General Manual Muscle Testing (MMT) Assessment  MMT unable to assess at this time d/t decreased command following. RUE anticipated trace 1/5, LUE anticipated 2+/5.  -MM (r) HH (t) MM (c)     Row Name 03/13/21 1057          Balance    Comment, Balance  balance not assessed at this time d/t decreased command following  -MM (r) HH (t) MM (c)       User Key  (r) = Recorded By, (t) = Taken By, (c) = Cosigned By    Initials Name Provider Type    Zaki Suarez, OTR/L Occupational Therapist    Yenny Mcduffie, OT Student OT Student        Goals/Plan    No documentation.       Clinical Impression     Row Name 03/13/21 1057          Pain Scale: FACES Pre/Post-Treatment    Pain: FACES Scale, Pretreatment  0-->no hurt  -MM (r) HH (t) MM (c)     Posttreatment Pain Rating  0-->no hurt  -MM (r) HH (t) MM (c)     Row Name 03/13/21 1057          Plan of Care Review    Plan of Care Reviewed With  patient  -MM (r) HH (t) MM (c)     Progress  no change  -MM (r) HH (t) MM (c)     Outcome Summary  OT evaluation completed. Pt demonstrates decreased command following at this time <10%, no verbal/nonverbal communication at this time. Unable to track with eyes at this time. PROM on RUE, trace muscle present. AROM/AAROM on LUE, able to move against gravity ROM <50%. Anticipated MMT for LUE at least 2+/5. Dependent for bed mobility d/t decreased command following. Pt responds to painful stimuli on BUE at this time. Will defer to nursing for positioning and PROM/AROM at this time. Pt does not meet criteria for skilled OT treatment at this time. Please reconsult when pt becomes more appropriate for therapy. Recommended d/c to [facility]ACH pending pt progress.  -MM (r) HH (t) MM (c)     Row Name 03/13/21 1058          Therapy Assessment/Plan (OT)    Criteria for Skilled Therapeutic Interventions Met (OT)  no;does not meet criteria for skilled intervention  -MM (r) HH (t) MM (c)     Row Name  03/13/21 1057          Therapy Plan Review/Discharge Plan (OT)    Anticipated Discharge Disposition (OT)  [facility]  -MM (r) HH (t) MM (c)     Row Name 03/13/21 1057          Vital Signs    Pre Systolic BP Rehab  143  -MM (r) HH (t) MM (c)     Pre Treatment Diastolic BP  59  -MM (r) HH (t) MM (c)     Post Systolic BP Rehab  157  -MM (r) HH (t) MM (c)     Post Treatment Diastolic BP  64  -MM (r) HH (t) MM (c)     Pretreatment Heart Rate (beats/min)  89  -MM (r) HH (t) MM (c)     Posttreatment Heart Rate (beats/min)  77  -MM (r) HH (t) MM (c)     Pre SpO2 (%)  98  -MM (r) HH (t) MM (c)     O2 Delivery Pre Treatment  supplemental O2 2L  -MM (r) HH (t) MM (c)     O2 Delivery Intra Treatment  supplemental O2 2L  -MM (r) HH (t) MM (c)     Post SpO2 (%)  100  -MM (r) HH (t) MM (c)     O2 Delivery Post Treatment  supplemental O2 2L  -MM (r) HH (t) MM (c)     Pre Patient Position  Supine  -MM (r) HH (t) MM (c)     Intra Patient Position  Supine  -MM (r) HH (t) MM (c)     Post Patient Position  Supine  -MM (r) HH (t) MM (c)     Row Name 03/13/21 1057          Positioning and Restraints    Pre-Treatment Position  in bed  -MM (r) HH (t) MM (c)     Post Treatment Position  bed  -MM (r) HH (t) MM (c)     In Bed  notified nsg;fowlers;patient within staff view;side rails up x3;RUE elevated;SCD pump applied;pillow between legs  -MM (r) HH (t) MM (c)       User Key  (r) = Recorded By, (t) = Taken By, (c) = Cosigned By    Initials Name Provider Type    Zaki Suarez, OTR/L Occupational Therapist    Yenny Mcduffie, OT Student OT Student        Outcome Measures     Row Name 03/13/21 1057          How much help from another is currently needed...    Putting on and taking off regular lower body clothing?  1  -MM (r) HH (t) MM (c)     Bathing (including washing, rinsing, and drying)  1  -MM (r) HH (t) MM (c)     Toileting (which includes using toilet bed pan or urinal)  1  -MM (r) HH (t) MM (c)     Putting on  and taking off regular upper body clothing  1  -MM (r) HH (t) MM (c)     Taking care of personal grooming (such as brushing teeth)  1  -MM (r) HH (t) MM (c)     Eating meals  1  -MM (r) HH (t) MM (c)     AM-PAC 6 Clicks Score (OT)  6  -MM (r) HH (t)     Row Name 03/13/21 1057          Modified [place] Scale    Pre-Stroke Modified [place] Scale  6 - Unable to determine (UTD) from the medical record documentation  -MM (r) HH (t) MM (c)     Modified Connie Scale  5 - Severe disability.  Bedridden, incontinent, and requiring constant nursing care and attention.  -MM (r) HH (t) MM (c)     Row Name 03/13/21 1057          Functional Assessment    Outcome Measure Options  AM-PAC 6 Clicks Daily Activity (OT);Modified [place]  -MM (r) HH (t) MM (c)       User Key  (r) = Recorded By, (t) = Taken By, (c) = Cosigned By    Initials Name Provider Type    MM Zaki Khan, OTR/L Occupational Therapist     Yenny Golden, OT Student OT Student        Occupational Therapy Education                 Title: PT OT SLP Therapies (In Progress)     Topic: Occupational Therapy (In Progress)     Point: ADL training (Not Started)     Description:   Instruct learner(s) on proper safety adaptation and remediation techniques during self care or transfers.   Instruct in proper use of assistive devices.              Learner Progress:  Not documented in this visit.          Point: Home exercise program (Not Started)     Description:   Instruct learner(s) on appropriate technique for monitoring, assisting and/or progressing therapeutic exercises/activities.              Learner Progress:  Not documented in this visit.          Point: Precautions (Not Started)     Description:   Instruct learner(s) on prescribed precautions during self-care and functional transfers.              Learner Progress:  Not documented in this visit.          Point: Body mechanics (In Progress)     Description:   Instruct learner(s) on proper positioning and spine alignment  during self-care, functional mobility activities and/or exercises.              Learning Progress Summary           Patient Acceptance, E, NL by  at 3/13/2021 1214                               User Key     Initials Effective Dates Name Provider Type Discipline     12/23/20 -  Yenny Golden OT Student OT Student OT                 03/13/21 1215   OT Discharge Summary   Anticipated Discharge Disposition (OT) long-term acute care facility   Reason for Discharge Unable to participate   Outcomes Achieved Other  (Evaluation only)   Discharge Destination [facility]ACH       OT Recommendation and Plan     Plan of Care Review  Plan of Care Reviewed With: patient  Progress: no change  Outcome Summary: OT evaluation completed. Pt demonstrates decreased command following at this time <10%, no verbal/nonverbal communication at this time. Unable to track with eyes at this time. PROM on RUE, trace muscle present. AROM/AAROM on LUE, able to move against gravity ROM <50%. Anticipated MMT for LUE at least 2+/5. Dependent for bed mobility d/t decreased command following. Pt responds to painful stimuli on BUE at this time. Will defer to nursing for positioning and PROM/AROM at this time. Pt does not meet criteria for skilled OT treatment at this time. Please reconsult when pt becomes more appropriate for therapy. Recommended d/c to [facility]ACH pending pt progress.     Time Calculation:   Time Calculation- OT     Row Name 03/13/21 1057             Time Calculation- OT    OT Start Time  1057 +8 min chart review  -MM (r) HH (t) MM (c)      OT Stop Time  1131  -MM (r) HH (t) MM (c)      OT Time Calculation (min)  34 min  -MM (r) HH (t)      OT Received On  03/13/21  -MM (r) HH (t) MM (c)        User Key  (r) = Recorded By, (t) = Taken By, (c) = Cosigned By    Initials Name Provider Type    MM Zaki Khan E, OTR/L Occupational Therapist    Yenny Mcduffie, OT Student OT Student                 Yenny Golden OT Student  3/13/2021

## 2021-03-13 NOTE — PLAN OF CARE
Goal Outcome Evaluation:  Plan of Care Reviewed With: patient  Progress: no change  Outcome Summary: Pt identified at nutrition risk r/t difficulty chewing/swallowing and unsure of weight loss. Pt with dx of CVA and hx of dementia. She is currently NPO. No weight hx available since 2019. Current weight actually shows weight gain since that time and a BMI of 32. Pt with elevated HbA1c of 10.6. Pt with hx of DM and is receiving insulin. Due to hx of dementia and confusion not a candidate for DM diet education at this time. Pt will need a ST evaluation before po diet can be started. Will cont to follow for nutrition POC.

## 2021-03-13 NOTE — THERAPY EVALUATION
Acute Care - Speech Language Pathology   Swallow Initial Evaluation [facility]     Patient Name: Val Gonzalez  : 1945  MRN: [id number]  Today's Date: 3/13/2021               Admit Date: 3/12/2021      SPEECH-LANGUAGE PATHOLOGY EVALUATION - SWALLOW  Subjective: The patient was seen on this date for a Clinical Swallow evaluation.  Patient was poorly arousable.  Significant history: admitted with MS change, does have some deficits from previous CVA, no unable to follow directions.  Objective: Textures given included thin liquid, nectar thick liquid, honey thick liquid and puree consistency.  Assessment: Difficulties were noted with thin liquid.  Patient was lethargic with testing but would wake verbal and tactile cues.  Required gentle tap on lips with spoon to open for spoon presentations.  Multiple swallows of 2-3 per trial with applesauce, honey thick, nectar thick, and thin.  No overt s/s of aspiration with applesauce, honey, or nectar thick.  Did have strong slightly delayed cough with thin liquids.  Did not try solids due to patients decreased alertness.  SLP Findings:  Patient presents with moderate oropharyngeal dysphagia, with esophageal component.   Recommendations: Diet Textures: nectar thick liquid, puree consistency food.  Medications should be taken crushed with thickened liquids or puree. May have ice between meals after oral care, under staff or family supervision and with the recommended strategies for safe swallowing.   Recommended Strategies: 1:1 supervision, gentle tap on upper lip to facilitate oral opening, notify SLP if any concerns, Upright for PO and small bites and sips. Oral care before breakfast, after all meals and PRN.  Other Recommended Evaluations: Speech-Language Evaluation and Cognitive-Linguistic Evaluation    Dysphagia therapy is recommended.   RN is aware of results of evaluation.  Winsome Lewis MS CCC-SLP 3/13/2021 09:28 CST    Visit Dx:     ICD-10-CM ICD-9-CM   1.  Cerebrovascular accident (CVA), unspecified mechanism (CMS/[facility])  I63.9 434.91   2. Oropharyngeal dysphagia  R13.12 787.22     Patient Active Problem List   Diagnosis   • Cerebrovascular accident (CVA) (CMS/[facility])   • Acute CVA (cerebrovascular accident) (CMS/[facility])   • Benign essential HTN   • Hyperlipidemia   • DM2 (diabetes mellitus, type 2) (CMS/[facility])   • Altered mental status     Past Medical History:   Diagnosis Date   • Altered mental status    • Alzheimer's disease (CMS/[facility])    • Arthritis    • Cerebral infarction (CMS/[facility])    • Dementia in other diseases classified elsewhere without behavioral disturbance (CMS/[facility])    • Elevated cholesterol    • Gastro-esophageal reflux disease without esophagitis    • Hemiplegia and hemiparesis following cerebral infarction affecting right dominant side (CMS/[facility])    • Hyperkalemia    • Hyperlipidemia    • Hypertension    • Long term (current) use of insulin (CMS/[facility])    • Other recurrent depressive disorders (CMS/[facility])    • Other specified hypothyroidism    • Pain, unspecified    • Stroke (CMS/[facility])    • Type 2 diabetes mellitus with hyperglycemia (CMS/[facility])      Past Surgical History:   Procedure Laterality Date   • BACK SURGERY          SWALLOW EVALUATION (last 72 hours)      SLP Adult Swallow Evaluation     Row Name 03/13/21 0840                   Rehab Evaluation    Document Type  evaluation  -KW        Subjective Information  no complaints  -KW        Patient Observations  lethargic;decreased LOC  -KW        Patient/Family/Caregiver Comments/Observations  no family present  -KW        Care Plan Review  care plan/treatment goals reviewed  -KW        Care Plan Review, Other Participant(s)  caregiver  -KW        Patient Effort  adequate  -KW           General Information    Patient Profile Reviewed  yes  -KW        Pertinent History Of Current Problem  admited from NH with change in mental status, some baseline deficits from previous CVA, however continued to decline in ER with  inability to follow directions  -KW        Current Method of Nutrition  NPO  -KW        Precautions/Limitations, Vision  WFL;for purposes of eval  -KW        Precautions/Limitations, Hearing  WFL;for purposes of eval  -KW        Prior Level of Function-Communication  cognitive-linguistic impairment  -KW        Prior Level of Function-Swallowing  unknown  -KW        Plans/Goals Discussed with  patient  -KW        Barriers to Rehab  previous functional deficit;cognitive status  -KW        Patient's Goals for Discharge  patient could not state  -KW           Pain    Additional Documentation  Pain Scale: FACES Pre/Post-Treatment (Group)  -KW           Pain Scale: FACES Pre/Post-Treatment    Pain: FACES Scale, Pretreatment  0-->no hurt  -KW        Posttreatment Pain Rating  0-->no hurt  -KW           Oral Motor Structure and Function    Dentition Assessment  upper dentures/partial in place could not see bottom  -KW        Secretion Management  WNL/WFL  -KW        Mucosal Quality  dry  -KW        Volitional Swallow  unable to elicit  -KW        Volitional Cough  unable to elicit  -KW           Oral Musculature and Cranial Nerve Assessment    Oral Motor General Assessment  unable to assess;generalized oral motor weakness  -KW        Lingual Impairment, Detail. Cranial Nerves IX, XII (Glossopharyngeal and Hypoglossal)  reduced strength;reduced lingual ROM  -KW        Oral Motor, Comment  did appear to have generalized weakness during automatic oral motor task but could not follow directions for more indepth testing.  -KW           General Eating/Swallowing Observations    Respiratory Support Currently in Use  nasal cannula  -KW        Eating/Swallowing Skills  fed by SLP  -KW        Positioning During Eating  upright in bed  -KW        Utensils Used  spoon;straw  -KW        Consistencies Trialed  thin liquids;nectar/syrup-thick liquids;honey-thick liquids;pudding thick  -KW           Clinical Swallow Eval    Oral Prep Phase   impaired  -KW        Oral Transit  impaired  -KW        Oral Residue  WFL  -KW        Pharyngeal Phase  suspected pharyngeal impairment  -KW        Esophageal Phase  unremarkable  -KW        Clinical Swallow Evaluation Summary  see report at top  -KW           Oral Prep Concerns    Oral Prep Concerns  reduced lip opening;incomplete or weak lip closure around spoon  -KW        Reduced Lip Opening  honey;pudding  -KW        Incomplete or Weak Lip Closure Around Spoon  honey;pudding  -KW           Oral Transit Concerns    Oral Transit Concerns  increased oral transit time  -KW        Increased Oral Transit Time  thin;nectar;honey;pudding  -KW           Pharyngeal Phase Concerns    Pharyngeal Phase Concerns  cough;multiple swallows  -KW        Multiple Swallows  thin;nectar;honey;pudding  -KW        Cough  thin  -KW           Clinical Impression    Barriers to Overall Progress (SLP)  previous deficits  -KW        SLP Swallowing Diagnosis  moderate;oral dysphagia;pharyngeal dysphagia  -KW        Functional Impact  risk of aspiration/pneumonia;risk of malnutrition;risk of dehydration  -KW        Rehab Potential/Prognosis, Swallowing  adequate, monitor progress closely  -KW        Swallow Criteria for Skilled Therapeutic Interventions Met  demonstrates skilled criteria  -KW           Recommendations    Therapy Frequency (Swallow)  at least;3 days per week  -KW        Predicted Duration Therapy Intervention (Days)  until discharge  -KW        SLP Diet Recommendation  puree;nectar thick liquids;ice chips between meals after oral care, with supervision  -KW        Recommended Precautions and Strategies  upright posture during/after eating;small bites of food and sips of liquid;1:1 supervision  -KW        Oral Care Recommendations  Oral Care before breakfast, after meals and PRN  -KW        SLP Rec. for Method of Medication Administration  meds crushed;with pudding or applesauce  -KW        Monitor for Signs of Aspiration   yes;notify SLP if any concerns  -KW        Anticipated Discharge Disposition (SLP)  skilled nursing facility  -KW           Swallow Goals (SLP)    Oral Nutrition/Hydration Goal Selection (SLP)  oral nutrition/hydration, SLP goal 1  -KW        Labial Strengthening Goal Selection (SLP)  labial strengthening, SLP goal 1  -KW        Lingual Strengthening Goal Selection (SLP)  lingual strengthening, SLP goal 1  -KW        Pharyngeal Strengthening Exercise Goal Selection (SLP)  pharyngeal strengthening exercise, SLP goal 1  -KW        Additional Documentation  labial strengthening goal selection (SLP);lingual strengthening goal selection (SLP);pharyngeal strengthening exercise goal selection (SLP)  -KW           Oral Nutrition/Hydration Goal 1 (SLP)    Oral Nutrition/Hydration Goal 1, SLP  LTG: Patient will tolerate LRD with no overt s/s of aspiration  -KW        Time Frame (Oral Nutrition/Hydration Goal 1, SLP)  short term goal (STG);by discharge  -KW        Barriers (Oral Nutrition/Hydration Goal 1, SLP)  pre-existing deficits  -KW        Progress/Outcomes (Oral Nutrition/Hydration Goal 1, SLP)  goal ongoing  -KW           Labial Strengthening Goal 1 (SLP)    Activity (Labial Strengthening Goal 1, SLP)  increase labial tone  -KW        Increase Labial Tone  labial resistance exercises  -KW        [place]/Accuracy (Labial Strengthening Goal 1, SLP)  independently (over 90% accuracy)  -KW        Time Frame (Labial Strengthening Goal 1, SLP)  short term goal (STG);by discharge  -KW        Barriers (Labial Strengthening Goal 1, SLP)  pre-existing deficits  -KW        Progress/Outcomes (Labial Strengthening Goal 1, SLP)  goal ongoing  -KW           Lingual Strengthening Goal 1 (SLP)    Activity (Lingual Strengthening Goal 1, SLP)  increase lingual tone/sensation/control/coordination/movement  -KW        Increase Lingual Tone/Sensation/Control/Coordination/Movement  lingual movement exercises  -KW         [place]/Accuracy (Lingual Strengthening Goal 1, SLP)  independently (over 90% accuracy)  -KW        Time Frame (Lingual Strengthening Goal 1, SLP)  short term goal (STG);by discharge  -KW        Barriers (Lingual Strengthening Goal 1, SLP)  pre-existing deficits  -KW        Progress/Outcomes (Lingual Strengthening Goal 1, SLP)  goal ongoing  -KW           Pharyngeal Strengthening Exercise Goal 1 (SLP)    Activity (Pharyngeal Strengthening Goal 1, SLP)  increase timing  -KW        Increase Timing  gustatory stimulation (sour/cold)  -KW        [place]/Accuracy (Pharyngeal Strengthening Goal 1, SLP)  independently (over 90% accuracy)  -KW        Time Frame (Pharyngeal Strengthening Goal 1, SLP)  short term goal (STG);by discharge  -KW        Barriers (Pharyngeal Strengthening Goal 1, SLP)  pre-existing deficits  -KW        Progress/Outcomes (Pharyngeal Strengthening Goal 1, SLP)  goal ongoing  -KW          User Key  (r) = Recorded By, (t) = Taken By, (c) = Cosigned By    Initials Name Effective Dates    Winsome Merino MS CCC-SLP 08/09/20 -           EDUCATION  The patient has been educated in the following areas:   Dysphagia (Swallowing Impairment).    SLP Recommendation and Plan  SLP Swallowing Diagnosis: moderate, oral dysphagia, pharyngeal dysphagia  SLP Diet Recommendation: puree, nectar thick liquids, ice chips between meals after oral care, with supervision  Recommended Precautions and Strategies: upright posture during/after eating, small bites of food and sips of liquid, 1:1 supervision  SLP Rec. for Method of Medication Administration: meds crushed, with pudding or applesauce     Monitor for Signs of Aspiration: yes, notify SLP if any concerns     Swallow Criteria for Skilled Therapeutic Interventions Met: demonstrates skilled criteria  Anticipated Discharge Disposition (SLP): skilled nursing facility  Rehab Potential/Prognosis, Swallowing: adequate, monitor progress closely  Therapy Frequency  (Swallow): at least, 3 days per week  Predicted Duration Therapy Intervention (Days): until discharge                         Plan of Care Reviewed With: caregiver  Progress: improving    SLP GOALS     Row Name 03/13/21 0840             Oral Nutrition/Hydration Goal 1 (SLP)    Oral Nutrition/Hydration Goal 1, SLP  LTG: Patient will tolerate LRD with no overt s/s of aspiration  -KW      Time Frame (Oral Nutrition/Hydration Goal 1, SLP)  short term goal (STG);by discharge  -KW      Barriers (Oral Nutrition/Hydration Goal 1, SLP)  pre-existing deficits  -KW      Progress/Outcomes (Oral Nutrition/Hydration Goal 1, SLP)  goal ongoing  -KW         Labial Strengthening Goal 1 (SLP)    Activity (Labial Strengthening Goal 1, SLP)  increase labial tone  -KW      Increase Labial Tone  labial resistance exercises  -KW      [place]/Accuracy (Labial Strengthening Goal 1, SLP)  independently (over 90% accuracy)  -KW      Time Frame (Labial Strengthening Goal 1, SLP)  short term goal (STG);by discharge  -KW      Barriers (Labial Strengthening Goal 1, SLP)  pre-existing deficits  -KW      Progress/Outcomes (Labial Strengthening Goal 1, SLP)  goal ongoing  -KW         Lingual Strengthening Goal 1 (SLP)    Activity (Lingual Strengthening Goal 1, SLP)  increase lingual tone/sensation/control/coordination/movement  -KW      Increase Lingual Tone/Sensation/Control/Coordination/Movement  lingual movement exercises  -KW      [place]/Accuracy (Lingual Strengthening Goal 1, SLP)  independently (over 90% accuracy)  -KW      Time Frame (Lingual Strengthening Goal 1, SLP)  short term goal (STG);by discharge  -KW      Barriers (Lingual Strengthening Goal 1, SLP)  pre-existing deficits  -KW      Progress/Outcomes (Lingual Strengthening Goal 1, SLP)  goal ongoing  -KW         Pharyngeal Strengthening Exercise Goal 1 (SLP)    Activity (Pharyngeal Strengthening Goal 1, SLP)  increase timing  -KW      Increase Timing  gustatory stimulation  (sour/cold)  -KW      [place]/Accuracy (Pharyngeal Strengthening Goal 1, SLP)  independently (over 90% accuracy)  -KW      Time Frame (Pharyngeal Strengthening Goal 1, SLP)  short term goal (STG);by discharge  -KW      Barriers (Pharyngeal Strengthening Goal 1, SLP)  pre-existing deficits  -KW      Progress/Outcomes (Pharyngeal Strengthening Goal 1, SLP)  goal ongoing  -KW        User Key  (r) = Recorded By, (t) = Taken By, (c) = Cosigned By    Initials Name Provider Type    Winsome Merino MS CCC-SLP Speech and Language Pathologist             Time Calculation:   Time Calculation- SLP     Row Name 03/13/21 0922             Time Calculation- SLP    SLP Start Time  0840  -KW      SLP Stop Time  0925  -KW      SLP Time Calculation (min)  45 min  -KW      SLP Received On  03/13/21  -KW      SLP Goal Re-Cert Due Date  03/23/21  -KW        User Key  (r) = Recorded By, (t) = Taken By, (c) = Cosigned By    Initials Name Provider Type    Winsome Merino MS CCC-SLP Speech and Language Pathologist          Therapy Charges for Today     Code Description Service Date Service Provider Modifiers Qty    [id number] HC ST EVAL ORAL PHARYNG SWALLOW 3 3/13/2021 Winsome Lewis MS CCC-SLP GN 1               Winsome Lewis MS CCC-ZULEIKA  3/13/2021

## 2021-03-13 NOTE — PLAN OF CARE
Goal Outcome Evaluation:  Plan of Care Reviewed With: caregiver  Progress: improving   SLP evaluation completed. Will address dysphagia and communication. Please see note for further details and recommendations.

## 2021-03-13 NOTE — PLAN OF CARE
Goal Outcome Evaluation:  Patient admitted from ER to the ICU. Initially, patient was hypertensive,  bolus NS given in er. 2 PIV's in place per ER. BP slowly improving. A-Fibb/A-Flutter noted on ekg. Murmur heard.  Not A&O x 4. Does not ROSADO equal, with drawes to stimuli. Lungs clear on R/A. PERRLA. UOP adequate with purwick in place. No BM noted since admit. BS active.Continue Neuro checks for changes in LOC.

## 2021-03-13 NOTE — PROGRESS NOTES
[facility] Medicine Services  INPATIENT PROGRESS NOTE    Patient Name: Val Gonzalez  Date of Admission: 3/12/2021  Today's Date: 03/13/21  Length of Stay: 1  Primary Care Physician: Provider, No Known    Subjective   Chief Complaint:   F/u CVA    HPI   Patient seen this morning in CCU 3.  She is more awake but still nonverbal.  She is following some commands.  Continues to have right neglect and left gaze preference.  She will squeeze her left hand when asked.  She did move both of her feet when asked.  She is somewhat delayed in response to commands. She is doing nothing with her right arm.  Overnight remained in sinus rhythm but was having multiple fevers.  Received Tylenol.  Blood pressure was high throughout the night. No other events.    Review of Systems   Unable to obtain due to patient factors    Objective    Temp:  [98.7 °F (37.1 °C)-102 °F (38.9 °C)] 99.1 °F (37.3 °C)  Heart Rate:  [] 89  Resp:  [17-20] 18  BP: (109-210)/() 145/72  Physical Exam  GEN: Awake but non-verbal, following some commands, NAD  HEENT: PERRLA, Anicteric, Trachea midline  Lungs: CTAB, no wheezing/rales/rhonchi  Heart: RRR, +S1/s2, no rub  ABD: obese, soft, nt, +BS, no guarding/rebound  Extremities: no cyanosis or overt edema  Skin: gluteal wounds POA, no petechiae   Neuro: exam is limited, following some commands, L gaze preference, R arm flaccid, squeezes on L, moving both feet.         Results Review:  I have reviewed the labs, radiology results, and diagnostic studies.    Laboratory Data:   Results from last 7 days   Lab Units 03/13/21  0205 03/12/21  1410   WBC 10*3/mm3 10.01 11.73*   HEMOGLOBIN g/dL 11.5* 12.9   HEMATOCRIT % 35.5 40.6   PLATELETS 10*3/mm3 311 349        Results from last 7 days   Lab Units 03/13/21  0205 03/12/21  1410   SODIUM mmol/L 136 137   POTASSIUM mmol/L 4.5 5.2   CHLORIDE mmol/L 100 100   CO2 mmol/L 26.0 28.0   BUN mg/dL 26* 29*   CREATININE mg/dL 0.91  1.06*   CALCIUM mg/dL 8.6 9.2   BILIRUBIN mg/dL 0.4 0.4   ALK PHOS U/L 92 107   ALT (SGPT) U/L 33 39*   AST (SGOT) U/L 22 33*   GLUCOSE mg/dL 341* 335*       Culture Data:   No results found for: BLOODCX, URINECX, WOUNDCX, MRSACX, RESPCX, STOOLCX    Radiology Data:   Imaging Results (Last 24 Hours)     Procedure Component Value Units Date/Time    CT Head Without Contrast [[id number]] Collected: 03/12/21 1858     Updated: 03/12/21 1903    Narrative:      EXAMINATION:  CT HEAD WO CONTRAST-  3/12/2021 6:39 PM CST     HISTORY: Stroke. Change in mental status.     TECHNIQUE: Multiple axial images were obtained through the brain without  contrast infusion. Multiplanar images were reconstructed.     DLP: 939 mGy-cm. Automated dosage control was utilized.     COMPARISON: 3/12/2021 3:07 PM.     FINDINGS: There are areas of low density in the left frontal-parietal  region similar to abnormality seen on brain MRI today which is  performed. The finding is consistent with acute infarct. There is no  hemorrhage. There is no shifting of the midline structures. There is  minimal sulcal effacement in the left frontal-parietal region. Low  density in the hemispheric white matter is otherwise nonspecific. There  is moderate atrophy with associated ventricular prominence. The  visualized paranasal sinuses and mastoid air cells are clear. The  current study is degraded by motion artifact.       Impression:      1. Acute left hemispheric infarct seen better on the recent MRI study.  2. Atrophy and chronic small vessel disease.  3. No evidence of acute hemorrhage.     This report was finalized on 03/12/2021 19:00 by Dr. Shaq Kumar MD.    MRI Brain Without Contrast [[id number]] Collected: 03/12/21 1837     Updated: 03/12/21 1846    Narrative:      EXAMINATION:  MRI BRAIN WO CONTRAST-  3/12/2021 6:12 PM CST     HISTORY: Neuro deficit, acute, stroke suspected; I63.9-Cerebral  infarction, unspecified     TECHNIQUE: Multiplanar imaging was  performed in a high field magnet.     COMPARISON: No comparison study.     FINDINGS: There are multifocal acute infarcts in the left hemisphere  predominantly in left MCA distribution. There is high signal on the  diffusion sequence in the right occipital lobe without definite  diffusion restriction on the ADC map images. With fluid signal on T2  images consistent with T2 shine through artifact. On the gradient echo  sequence, there is an area of susceptibility artifact in the upper  portion of the right sylvian fissure. There are few areas of  susceptibility artifact in sulci in the left frontal-parietal region.  There is moderate to severe diffuse atrophy with associated ventricular  prominence. There is T2 high signal in the hemispheric white matter and  the varun that is nonspecific and likely due to chronic small vessel  disease.       Impression:      1. Multifocal areas of acute infarct in the left frontal-parietal region  predominantly in a left MCA distribution.  2. Chronic infarct in the right occipital lobe.  3. Areas of T2 high signal in the hemispheric white matter and varun  without diffusion signal abnormality most likely due to chronic small  vessel disease.  4. Moderate atrophy with associated ventricular prominence.     Results called to the emergency room at 6:42 PM.        This report was finalized on 03/12/2021 18:43 by Dr. Shaq Kumar MD.    XR Abdomen KUB [[id number]] Collected: 03/12/21 1725     Updated: 03/12/21 1729    Narrative:      EXAMINATION:  XR ABDOMEN KUB-  3/12/2021 5:13 PM CST     HISTORY: MRI; I63.9-Cerebral infarction, unspecified.     COMPARISON: No comparison study.     TECHNIQUE: Supine abdomen.      FINDINGS:   There has been prior posterior instrumented fusion in the  lumbar region. There is scoliosis and degenerative change of the spine.  There has been prior left hip arthroplasty. The bowel gas pattern is  normal. There is no organomegaly or mass effect.       Impression:       No acute findings.        This report was finalized on 03/12/2021 17:26 by Dr. Shaq Kumar MD.    CT Head Without Contrast [[id number]] Collected: 03/12/21 1538     Updated: 03/12/21 1548    Narrative:      Exam: CT HEAD WO CONTRAST-     Indication: Neuro deficit, acute, stroke suspected     Comparison: None     Dose length product: 1887 mGy cm. Automated exposure control was also  utilized to decrease patient radiation dose.     Findings:     No evidence of intracranial hemorrhage. Gray-white differentiation is  maintained. Chronic microvascular ischemic white matter change. Old  lacunar infarct in the LEFT caudate. Focal hypodensity in the  periventricular and subcortical LEFT frontal white matter on image 22  also favored to be related to old chronic microvascular ischemia. No  midline shift or mass effect. Lateral ventricles are nondilated. Basilar  cisterns are patent. Global cerebral volume loss is noted. Vascular  calcification. No acute orbital finding. Bilateral small mastoid  effusions. Paranasal sinuses are clear. No acute osseous findings.       Impression:      Impression:     1.  No acute intracranial findings.  2.  Chronic microvascular ischemic white matter change and old appearing  LEFT caudate lacunar infarct.  3.  Recommend brain MRI if there is clinical concern for acute ischemia.  4.  Small mastoid effusions.  This report was finalized on 03/12/2021 15:45 by Dr. Rogerio Jaimes MD.    XR Forearm 2 View Left [[id number]] Collected: 03/12/21 1544     Updated: 03/12/21 1548    Narrative:      Left forearm, 2 views 3/12/2021 3:19 PM CST     HISTORY: pain     COMPARISON: NONE     FINDINGS:  Frontal and lateral radiographs of the left forearm were obtained.     There is no evidence of fracture. First carpometacarpal, triscaphe and  radiocarpal joint osteoarthritis changes. Additional osteoarthritis  changes at the elbow joint. Nonspecific soft tissue swelling along the  dorsal aspect of the  forearm, especially along the more proximal aspect.       Impression:      1. Nonspecific soft tissue swelling along the more proximal aspect of  the dorsal forearm. No underlying bony abnormality.  2. 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     This report was finalized on 03/12/2021 15:45 by Dr Vinod Conner, .    XR Humerus Left [[id number]] Collected: 03/12/21 1542     Updated: 03/12/21 1547    Narrative:      Left humerus, 2 views 3/12/2021 3:19 PM CST     HISTORY: pain     COMPARISON: NONE     FINDINGS:  Frontal and lateral radiographs of the left humerus were obtained.     Left shoulder arthroplasty. No periprosthetic fracture. In general, no  fractures seen. 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 No gross  soft tissue abnormality.       Impression:      1. Left shoulder arthroplasty. No hardware complication. No acute  fracture.  2. 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     This report was finalized on 03/12/2021 15:44 by Dr Vinod Conner, .    XR Chest 1 View [[id number]] Collected: 03/12/21 1539     Updated: 03/12/21 1542    Narrative:      Frontal upright radiograph of the chest 3/12/2021 3:19 PM CST     HISTORY: Weakness     COMPARISON: None.     FINDINGS:      No lung consolidation. No pleural effusion or pneumothorax. The  cardiomediastinal silhouette and pulmonary vascularity are within normal  limits. Left shoulder arthroplasty. No acute osseous injury.       Impression:      1. No radiographic evidence of acute cardiopulmonary process.        This report was finalized on 03/12/2021 15:39 by Dr Vinod Conner, .          I have reviewed the patient's current medications.     Assessment/Plan     Active Hospital Problems    Diagnosis    • **Acute CVA (cerebrovascular accident) (CMS/HCC)    • Benign essential HTN    • Hyperlipidemia    • DM2 (diabetes mellitus, type 2) (CMS/HCC)    • Altered mental status        #1 acute CVA -on MRI found to have acute CVA multifocal predominantly  in the left MCA distribution.  Also has chronic old right occipital lobe infarct.  Was admitted to the ICU last night due to poor GCS.  She has done okay overnight and is more awake today.  Awaiting 2D echo and NICs.  Still awaiting med listed from facility. Will likely plan for aspirin and statin.  Patient will be unlikely to take p.o. at this point however.  May need suppository. Awaiting speech eval.  PT/OT.  Neurology to see.    #2 fever -unclear etiology.  No significant white count on arrival.  Chest x-ray clear.  Urinalysis not overly infectious.  Abdomen benign.  Question neurologic.  Blood cultures were drawn.  Hold antibiotics for now monitor for ongoing signs of infection.    #3 essential hypertension -allowing permissive hypertension today.  Monitor closely.    #4 hyperlipidemia -per history.  Lipid panel shows fairly well-controlled cholesterol numbers.  Low HDL.  LDL 73.    #5 DM 2 -POCT is every 6 hours.  Sliding scale.  Increase Levemir at night from 10 units to 20 units.    #6 altered mental status -somereported history of dementia but unclear at this time.  Likely in the setting of her stroke.  She also had a fever but no obvious signs of infection.  Continue to monitor and treat supportively.      Discharge Planning: Ongoing work-up and care.  We will follow up neuro evaluation.  Can likely move out of the ICU later on today pending a.m. neuro status and neuro recommendations.    Electronically signed by Jose Antonio Hi DO, 03/13/21, 08:09 CST.

## 2021-03-13 NOTE — CONSULTS
Neurology Consult Note    Referring Provider: Dr. Isaiah Hi  Reason for Consultation: stroke      History of present illness:    This is a 76-year-old female with multiple stroke risk factors including hypertension, hyperlipidemia, uncontrolled diabetes mellitus, and a previous stroke.  She reportedly had a stroke recently in early March and was hospitalized in a hospital in [place].  She was then sent to a nursing facility on March 9.  Little is known about this.  She arrived to our hospital yesterday with a last seen normal time of around 8 PM the day before.  She presented with an aphasia and right-sided weakness.  A stat head CT was unremarkable.  She reportedly has a history of previous dementia.  She is now in the critical care unit was stable overnight.  She is hypertensive.  She shows signs of a gaze preference, global aphasia, and right-sided weakness.    Hospitalized in [facility] per , Rod Gonzalez.  Per him, he doesn't remember the symptoms of her previous stroke.  He tells me before the stroke she used a walker and a lift chair.  She could perform the majority of her ADL's independently.      Past Medical History:   Diagnosis Date   • Altered mental status    • Alzheimer's disease (CMS/HCC)    • Arthritis    • Cerebral infarction (CMS/HCC)    • Dementia in other diseases classified elsewhere without behavioral disturbance (CMS/HCC)    • Elevated cholesterol    • Gastro-esophageal reflux disease without esophagitis    • Hemiplegia and hemiparesis following cerebral infarction affecting right dominant side (CMS/HCC)    • Hyperkalemia    • Hyperlipidemia    • Hypertension    • Long term (current) use of insulin (CMS/HCC)    • Other recurrent depressive disorders (CMS/HCC)    • Other specified hypothyroidism    • Pain, unspecified    • Stroke (CMS/HCC)    • Type 2 diabetes mellitus with hyperglycemia (CMS/HCC)        Allergies   Allergen Reactions   • Sulfa Antibiotics Unknown - High Severity   •  Tetanus Toxoids Unknown - High Severity     No current facility-administered medications on file prior to encounter.     Current Outpatient Medications on File Prior to Encounter   Medication Sig   • aspirin 81 MG chewable tablet Chew 81 mg Daily.   • cephalexin (KEFLEX) 500 MG capsule Take 500 mg by mouth 3 (Three) Times a Day.   • donepezil (ARICEPT) 5 MG tablet Take 5 mg by mouth Every Night.   • insulin detemir (LEVEMIR) 100 UNIT/ML injection Inject 35 Units under the skin into the appropriate area as directed Daily.   • levothyroxine (SYNTHROID, LEVOTHROID) 75 MCG tablet Take 75 mcg by mouth Daily.   • losartan (COZAAR) 50 MG tablet Take 100 mg by mouth Daily.   • metoprolol tartrate (LOPRESSOR) 25 MG tablet Take 25 mg by mouth 2 (Two) Times a Day.   • oxyCODONE (OXY-IR) 5 MG capsule Take 5 mg by mouth 3 (Three) Times a Day.   • pantoprazole (PROTONIX) 40 MG EC tablet Take 40 mg by mouth Daily.   • potassium chloride (K-DUR,KLOR-CON) 20 MEQ CR tablet Take 20 mEq by mouth 2 (Two) Times a Day.   • sucralfate (CARAFATE) 1 g tablet Take 1 g by mouth 4 (Four) Times a Day.   • traZODone (DESYREL) 50 MG tablet Take 50 mg by mouth Every Night.       Social History     Socioeconomic History   • Marital status:      Spouse name: Not on file   • Number of children: Not on file   • Years of education: Not on file   • Highest education level: Not on file   Tobacco Use   • Smoking status: Unknown If Ever Smoked   Vaping Use   • Vaping Use: Never used   Substance and Sexual Activity   • Alcohol use: Not Currently   • Drug use: Not Currently   • Sexual activity: Defer     History reviewed. No pertinent family history.    Review of Systems  A 14 point review of systems was reviewed and was negative except for aphasia    Vital Signs   Temp:  [98.7 °F (37.1 °C)-102 °F (38.9 °C)] 99.1 °F (37.3 °C)  Heart Rate:  [] 89  Resp:  [17-20] 18  BP: (109-210)/() 145/72    General Exam:  Head:  Normal cephalic,  atraumatic  HEENT:  Neck supple  Fundoscopic Exam:  No signs of disc edema  CVS:  Regular rate and rhythm.  No murmurs  Carotid Examination:  No bruits  Lungs:  Clear to auscultation  Abdomen:  Non-tender, Non-distended  Extremities:  No signs of peripheral edema  Skin:  No rashes    Neurologic Exam:    The patient is somnolent but awakens with any stimulation  Global aphasia  No command following    Pupils are equal  Left gaze preference with inability only to go to the midline  Corneal reflexes intact  Glabella reflex is negative  Corneal and gag's are intact    Left arm and leg have full strength  Right arm has 1-5 strength with right leg 2 out of 5  DTR reflex evaluation shows evidence of an upgoing toe on the right side    Since examination reveals no neglect as she does withdrawal in all 4 extremities    Coordination gait examination does show evidence of a resting tremor in the left upper extremity with some cogwheel rigidity      Results Review:  Lab Results (last 24 hours)     Procedure Component Value Units Date/Time    POC Glucose Once [[id number]]  (Abnormal) Collected: 03/13/21 0626    Specimen: Blood Updated: 03/13/21 0632     Glucose 249 mg/dL      Comment: : [email or web address] Jose Banguraaudie ID: [id number]       Hemoglobin A1c [[id number]]  (Abnormal) Collected: 03/13/21 0205    Specimen: Blood Updated: 03/13/21 0309     Hemoglobin A1C 10.60 %     Narrative:      Hemoglobin A1C Ranges:    Increased Risk for Diabetes  5.7% to 6.4%  Diabetes                     >= 6.5%  Diabetic Goal                < 7.0%    Lipid Panel [[id number]]  (Abnormal) Collected: 03/13/21 0205    Specimen: Blood Updated: 03/13/21 0248     Total Cholesterol 114 mg/dL      Triglycerides 74 mg/dL      HDL Cholesterol 26 mg/dL      LDL Cholesterol  73 mg/dL      VLDL Cholesterol 15 mg/dL      LDL/HDL Ratio 2.82    Narrative:      Cholesterol Reference Ranges  (U.S. Department of Health and Human Services ATP III  Classifications)    Desirable          <200 mg/dL  Borderline High    200-239 mg/dL  High Risk          >240 mg/dL      Triglyceride Reference Ranges  (U.S. Department of Health and Human Services ATP III Classifications)    Normal           <150 mg/dL  Borderline High  150-199 mg/dL  High             200-499 mg/dL  Very High        >500 mg/dL    HDL Reference Ranges  (U.S. Department of Health and Human Services ATP III Classifcations)    Low     <40 mg/dl (major risk factor for CHD)  High    >60 mg/dl ('negative' risk factor for CHD)        LDL Reference Ranges  (U.S. Department of Health and Human Services ATP III Classifcations)    Optimal          <100 mg/dL  Near Optimal     100-129 mg/dL  Borderline High  130-159 mg/dL  High             160-189 mg/dL  Very High        >189 mg/dL    Comprehensive Metabolic Panel [[id number]]  (Abnormal) Collected: 03/13/21 0205    Specimen: Blood Updated: 03/13/21 0248     Glucose 341 mg/dL      BUN 26 mg/dL      Creatinine 0.91 mg/dL      Sodium 136 mmol/L      Potassium 4.5 mmol/L      Chloride 100 mmol/L      CO2 26.0 mmol/L      Calcium 8.6 mg/dL      Total Protein 6.2 g/dL      Albumin 2.60 g/dL      ALT (SGPT) 33 U/L      AST (SGOT) 22 U/L      Alkaline Phosphatase 92 U/L      Total Bilirubin 0.4 mg/dL      eGFR Non African Amer 60 mL/min/1.73      Globulin 3.6 gm/dL      A/G Ratio 0.7 g/dL      BUN/Creatinine Ratio 28.6     Anion Gap 10.0 mmol/L     Narrative:      GFR Normal >60  Chronic Kidney Disease <60  Kidney Failure <15      CBC (No Diff) [[id number]]  (Abnormal) Collected: 03/13/21 0205    Specimen: Blood Updated: 03/13/21 0221     WBC 10.01 10*3/mm3      RBC 4.13 10*6/mm3      Hemoglobin 11.5 g/dL      Hematocrit 35.5 %      MCV 86.0 fL      MCH 27.8 pg      MCHC 32.4 g/dL      RDW 13.2 %      RDW-SD 41.6 fl      MPV 11.1 fL      Platelets 311 10*3/mm3     POC Glucose Once [[id number]]  (Abnormal) Collected: 03/13/21 0044    Specimen: Blood Updated: 03/13/21 0045      Glucose 316 mg/dL      Comment: : [email or web address] Jose ChungMeter ID: [id number]       POC Glucose Once [[id number]]  (Abnormal) Collected: 03/12/21 2238    Specimen: Blood Updated: 03/12/21 2240     Glucose 270 mg/dL      Comment: : [email or web address] Jose ChungMeter ID: [id number]       POC Glucose Once [[id number]]  (Abnormal) Collected: 03/12/21 2047    Specimen: Blood Updated: 03/12/21 2058     Glucose 382 mg/dL      Comment: : [id number] Bryson RutledgeMeter ID: [id number]       COVID PRE-OP / PRE-PROCEDURE SCREENING ORDER (NO ISOLATION) - Swab, Nasal Cavity [[id number]]  (Normal) Collected: 03/12/21 1638    Specimen: Swab from Nasal Cavity Updated: 03/12/21 1806    Narrative:      The following orders were created for panel order COVID PRE-OP / PRE-PROCEDURE SCREENING ORDER (NO ISOLATION) - Swab, Nasal Cavity.  Procedure                               Abnormality         Status                     ---------                               -----------         ------                     COVID-19,[facility] IN-RICHELLE...[[id number]]  Normal              Final result                 Please view results for these tests on the individual orders.    COVID-19,[facility] IN-HOUSE,Nasal Swab No Transport Media 3-4 HR TAT - Swab, Nasal Cavity [[id number]]  (Normal) Collected: 03/12/21 1638    Specimen: Swab from Nasal Cavity Updated: 03/12/21 1806     COVID19 Not Detected    Narrative:      Fact sheet for providers: https://www.fda.gov/media/672936/download     Fact sheet for patients: https://www.fda.gov/media/608678/download    Test performed by PCR.    Consider negative results in combination with clinical observations, patient history, and epidemiological information.          .  Imaging Results (Last 24 Hours)     Procedure Component Value Units Date/Time    CT Head Without Contrast [[id number]] Collected: 03/12/21 1858     Updated: 03/12/21 1903    Narrative:      EXAMINATION:  CT HEAD WO CONTRAST-  3/12/2021 6:39 PM  CST     HISTORY: Stroke. Change in mental status.     TECHNIQUE: Multiple axial images were obtained through the brain without  contrast infusion. Multiplanar images were reconstructed.     DLP: 939 mGy-cm. Automated dosage control was utilized.     COMPARISON: 3/12/2021 3:07 PM.     FINDINGS: There are areas of low density in the left frontal-parietal  region similar to abnormality seen on brain MRI today which is  performed. The finding is consistent with acute infarct. There is no  hemorrhage. There is no shifting of the midline structures. There is  minimal sulcal effacement in the left frontal-parietal region. Low  density in the hemispheric white matter is otherwise nonspecific. There  is moderate atrophy with associated ventricular prominence. The  visualized paranasal sinuses and mastoid air cells are clear. The  current study is degraded by motion artifact.       Impression:      1. Acute left hemispheric infarct seen better on the recent MRI study.  2. Atrophy and chronic small vessel disease.  3. No evidence of acute hemorrhage.     This report was finalized on 03/12/2021 19:00 by Dr. Shaq Kumar MD.    MRI Brain Without Contrast [[id number]] Collected: 03/12/21 1837     Updated: 03/12/21 1846    Narrative:      EXAMINATION:  MRI BRAIN WO CONTRAST-  3/12/2021 6:12 PM CST     HISTORY: Neuro deficit, acute, stroke suspected; I63.9-Cerebral  infarction, unspecified     TECHNIQUE: Multiplanar imaging was performed in a high field magnet.     COMPARISON: No comparison study.     FINDINGS: There are multifocal acute infarcts in the left hemisphere  predominantly in left MCA distribution. There is high signal on the  diffusion sequence in the right occipital lobe without definite  diffusion restriction on the ADC map images. With fluid signal on T2  images consistent with T2 shine through artifact. On the gradient echo  sequence, there is an area of susceptibility artifact in the upper  portion of the right  sylvian fissure. There are few areas of  susceptibility artifact in sulci in the left frontal-parietal region.  There is moderate to severe diffuse atrophy with associated ventricular  prominence. There is T2 high signal in the hemispheric white matter and  the varun that is nonspecific and likely due to chronic small vessel  disease.       Impression:      1. Multifocal areas of acute infarct in the left frontal-parietal region  predominantly in a left MCA distribution.  2. Chronic infarct in the right occipital lobe.  3. Areas of T2 high signal in the hemispheric white matter and varun  without diffusion signal abnormality most likely due to chronic small  vessel disease.  4. Moderate atrophy with associated ventricular prominence.     Results called to the emergency room at 6:42 PM.        This report was finalized on 03/12/2021 18:43 by Dr. Shaq Kumar MD.    XR Abdomen KUB [[id number]] Collected: 03/12/21 1725     Updated: 03/12/21 1729    Narrative:      EXAMINATION:  XR ABDOMEN KUB-  3/12/2021 5:13 PM CST     HISTORY: MRI; I63.9-Cerebral infarction, unspecified.     COMPARISON: No comparison study.     TECHNIQUE: Supine abdomen.      FINDINGS:   There has been prior posterior instrumented fusion in the  lumbar region. There is scoliosis and degenerative change of the spine.  There has been prior left hip arthroplasty. The bowel gas pattern is  normal. There is no organomegaly or mass effect.       Impression:      No acute findings.        This report was finalized on 03/12/2021 17:26 by Dr. Shaq Kumar MD.            MRI brain reviewed by me.  There is a subacute infarct in the right parietal-occipital lobe just adjacent to the posterior horn of the right lateral ventricle.  It is positive on DWI but negative on ADC.  The more impressive feature is a moderate size left MCA stroke which extends from the posterior portions of the left frontal lobe vertical up into the vertex and in a parasagittal position  near the vertex likely crossing the homunculus.  There is no evidence of hemorrhagic conversion nor shift.    CUS:  Pending  TTE: pending  LDL:  73  HbA1c:  10.6    Impression    • Acute left MCA stroke  • Subacute right parietal stroke  • Mild hyperlipidemia  • Diabetes mellitus with poor control with hemoglobin A1c above the goal of 7  • Global aphasia  • Right hemiparesis  • Dementia    Plan    • Continue aspirin for now until we can determine whether she will require a PEG tube  • May consider transition to Plavix at discharge  • Continue cardiac telemetry looking for signs of atrial fibrillation  • Increase glycemic control  • Cardiac echo and carotid ultrasound pending  • Lipitor 40 mg daily  • Continue Aricept  • Poor prognosis of functional neurologic outcome given the diagnosis of underlying dementia with a dominant hemisphere stroke.    I discussed the patients findings and my recommendations with her .      Wallace Angel MD  03/13/21  10:23 CST

## 2021-03-13 NOTE — PLAN OF CARE
Goal Outcome Evaluation:  Plan of Care Reviewed With: patient  Progress: no change  Outcome Summary: PT evaluation completed. Pt dx with CVA. Pt was nonverbal therefore unable to obtain alertness and orientation. Pt was repostioned in bed via dependent scooting. Pt demonstrates withdrawl from painful stimuli bilaterally. B PROM WFL, and patient able to demonstrate AAROM with all L LE movements. Senstation was unable to be properly assessed. Pt does not meet needs to continue with PT due to inability to follow commands. PT to sign off, nursing to reconsult pending change in pt status. Recommend discharge to SNF.

## 2021-03-13 NOTE — PLAN OF CARE
Goal Outcome Evaluation:  Plan of Care Reviewed With: patient  Progress: no change  Outcome Summary: OT evaluation completed. Pt demonstrates decreased command following at this time <10%, no verbal/nonverbal communication at this time. Unable to track with eyes at this time. PROM on RUE, trace muscle present. AROM/AAROM on LUE, able to move against gravity ROM <50%. Anticipated MMT for LUE at least 2+/5. Dependent for bed mobility d/t decreased command following. Pt responds to painful stimuli on BUE at this time. Will defer to nursing for positioning and PROM/AROM at this time. Pt does not meet criteria for skilled OT treatment at this time. Please reconsult when pt becomes more appropriate for therapy. Recommended d/c to [facility] pending pt progress.

## 2021-03-13 NOTE — THERAPY EVALUATION
Patient Name: Val Gonzalez  : 1945    MRN: [id number]                              Today's Date: 3/13/2021       Admit Date: 3/12/2021    Visit Dx:     ICD-10-CM ICD-9-CM   1. Cerebrovascular accident (CVA), unspecified mechanism (CMS/[facility])  I63.9 434.91   2. Oropharyngeal dysphagia  R13.12 787.22     Patient Active Problem List   Diagnosis   • Cerebrovascular accident (CVA) (CMS/[facility])   • Acute CVA (cerebrovascular accident) (CMS/[facility])   • Benign essential HTN   • Hyperlipidemia   • DM2 (diabetes mellitus, type 2) (CMS/[facility])   • Altered mental status     Past Medical History:   Diagnosis Date   • Altered mental status    • Alzheimer's disease (CMS/[facility])    • Arthritis    • Cerebral infarction (CMS/[facility])    • Dementia in other diseases classified elsewhere without behavioral disturbance (CMS/[facility])    • Elevated cholesterol    • Gastro-esophageal reflux disease without esophagitis    • Hemiplegia and hemiparesis following cerebral infarction affecting right dominant side (CMS/[facility])    • Hyperkalemia    • Hyperlipidemia    • Hypertension    • Long term (current) use of insulin (CMS/[facility])    • Other recurrent depressive disorders (CMS/[facility])    • Other specified hypothyroidism    • Pain, unspecified    • Stroke (CMS/[facility])    • Type 2 diabetes mellitus with hyperglycemia (CMS/[facility])      Past Surgical History:   Procedure Laterality Date   • BACK SURGERY       General Information     Row Name 21 1145          Physical Therapy Time and Intention    Document Type  evaluation Multifocal areas of acute infarct in the left frontal-parietal regionpredominantly in a left MCA distribution.  -SB (r) ES (t) SB (c)     Mode of Treatment  physical therapy  -SB (r) ES (t) SB (c)     Row Name 21 1143          General Information    Patient Profile Reviewed  yes  -SB (r) ES (t) SB (c)     Prior Level of Function  -- unable to obtain d/t lack of following commands  -SB (r) ES (t) SB (c)     Barriers to Rehab  cognitive  status;medically complex;previous functional deficit  -SB (r) ES (t) SB (c)     Row Name 03/13/21 1145          Living Environment    Lives With  facility resident  -SB (r) ES (t) SB (c)     Row Name 03/13/21 1145          Cognition    Orientation Status (Cognition)  unable/difficult to assess pt nonverbal  -SB (r) ES (t) SB (c)     Row Name 03/13/21 1145          Safety Issues, Functional Mobility    Safety Issues Affecting Function (Mobility)  ability to follow commands;friction/shear risk  -SB (r) ES (t) SB (c)     Impairments Affecting Function (Mobility)  cognition;muscle tone abnormal;strength  -SB (r) ES (t) SB (c)     Cognitive Impairments, Mobility Safety/Performance  attention  -SB (r) ES (t) SB (c)     Comment, Safety Issues/Impairments (Mobility)  pt unable to follow commands  -SB (r) ES (t) SB (c)       User Key  (r) = Recorded By, (t) = Taken By, (c) = Cosigned By    Initials Name Provider Type    Tari Ramos, PT DPT Physical Therapist    Alma Rasmussen, PT Student PT Student        Mobility     Row Name 03/13/21 1145          Bed Mobility    Bed Mobility  scooting/bridging  -SB (r) ES (t) SB (c)     Scooting/Bridging [place] (Bed Mobility)  2 person assist;dependent (less than 25% patient effort)  -SB (r) ES (t) SB (c)     Assistive Device (Bed Mobility)  draw sheet  -SB (r) ES (t) SB (c)       User Key  (r) = Recorded By, (t) = Taken By, (c) = Cosigned By    Initials Name Provider Type    Tari Ramos, PT DPT Physical Therapist    Alma Rasmussen, PT Student PT Student        Obj/Interventions     Row Name 03/13/21 1148          Range of Motion Comprehensive    General Range of Motion  lower extremity range of motion deficits identified  -SB (r) ES (t) SB (c)     Comment, General Range of Motion  B LE PROM within functional limits. Pt able perform AAROM in LE with hip flexion/extension, knee flexion/extension, and ankle DF/PF  -SB (r) ES (t) SB (c)     Row Name 03/13/21 1143           Strength Comprehensive (MMT)    General Manual Muscle Testing (MMT) Assessment  other (see comments)  -SB (r) ES (t) SB (c)     Comment, General Manual Muscle Testing (MMT) Assessment  unable to assess d/t inability to follow commands  -SB (r) ES (t) SB (c)     Row Name 03/13/21 2135          Sensory Assessment (Somatosensory)    Sensory Assessment (Somatosensory)  unable/difficult to assess  -SB (r) ES (t) SB (c)       User Key  (r) = Recorded By, (t) = Taken By, (c) = Cosigned By    Initials Name Provider Type    Tari Ramos, PT DPT Physical Therapist    Alma Rasmussen, PT Student PT Student        Goals/Plan    No documentation.       Clinical Impression     Row Name 03/13/21 6500          Pain    Additional Documentation  Pain Scale: FACES Pre/Post-Treatment (Group) unable to assess d/t inability to follow commands; however, pt did demonstrate withdraw from painful stimuli at B LE and B UE  -SB (r) ES (t) SB (c)     Row Name 03/13/21 2675          Plan of Care Review    Plan of Care Reviewed With  patient  -SB (r) ES (t) SB (c)     Progress  no change  -SB (r) ES (t) SB (c)     Outcome Summary  PT evaluation completed. Pt dx with CVA. Pt was nonverbal therefore unable to obtain alertness and orientation. Pt was repostioned in bed via dependent scooting. Pt demonstrates withdrawl from painful stimuli bilaterally. B PROM WFL, and patient able to demonstrate AAROM with all L LE movements. Senstation was unable to be properly assessed. Pt does not meet needs to continue with PT due to inability to follow commands. PT to sign off, nursing to reconsult pending change in pt status. Recommend discharge to SNF.  -SB (r) ES (t) SB (c)     Row Name 03/13/21 9658          Therapy Assessment/Plan (PT)    Criteria for Skilled Interventions Met (PT)  does not meet criteria for skilled intervention;no  -SB (r) ES (t) SB (c)     Row Name 03/13/21 0267          Vital Signs    Pre Systolic BP Rehab  143  -SB (r) ES (t) SB  (c)     Pre Treatment Diastolic BP  59  -SB (r) ES (t) SB (c)     Post Systolic BP Rehab  157  -SB (r) ES (t) SB (c)     Post Treatment Diastolic BP  70  -SB (r) ES (t) SB (c)     Pretreatment Heart Rate (beats/min)  86  -SB (r) ES (t) SB (c)     Posttreatment Heart Rate (beats/min)  80  -SB (r) ES (t) SB (c)     Pre SpO2 (%)  97  -SB (r) ES (t) SB (c)     O2 Delivery Pre Treatment  nasal cannula  -SB (r) ES (t) SB (c)     O2 Delivery Intra Treatment  nasal cannula  -SB (r) ES (t) SB (c)     Post SpO2 (%)  99  -SB (r) ES (t) SB (c)     O2 Delivery Post Treatment  nasal cannula  -SB (r) ES (t) SB (c)     Pre Patient Position  Supine  -SB (r) ES (t) SB (c)     Post Patient Position  Supine  -SB (r) ES (t) SB (c)     Row Name 03/13/21 1145          Positioning and Restraints    Pre-Treatment Position  in bed  -SB (r) ES (t) SB (c)     Post Treatment Position  bed  -SB (r) ES (t) SB (c)     In Bed  notified nsg;side rails up x3;call light within reach;encouraged to call for assist;fowlers;SCD pump applied  -SB (r) ES (t) SB (c)       User Key  (r) = Recorded By, (t) = Taken By, (c) = Cosigned By    Initials Name Provider Type    Tari Ramos, PT DPT Physical Therapist    Alma Rasmussen, PT Student PT Student        Outcome Measures     Row Name 03/13/21 1202          How much help from another person do you currently need...    Turning from your back to your side while in flat bed without using bedrails?  1  -SB (r) ES (t) SB (c)     Moving from lying on back to sitting on the side of a flat bed without bedrails?  1  -SB (r) ES (t) SB (c)     Moving to and from a bed to a chair (including a wheelchair)?  1  -SB (r) ES (t) SB (c)     Standing up from a chair using your arms (e.g., wheelchair, bedside chair)?  1  -SB (r) ES (t) SB (c)     Climbing 3-5 steps with a railing?  1  -SB (r) ES (t) SB (c)     To walk in hospital room?  1  -SB (r) ES (t) SB (c)     AM-PAC 6 Clicks Score (PT)  6  -SS (r) ES (t)     Row Name  03/13/21 1202          Modified [place] Scale    Pre-Stroke Modified [place] Scale  6 - Unable to determine (UTD) from the medical record documentation  -SB (r) ES (t) SB (c)     Modified [place] Scale  5 - Severe disability.  Bedridden, incontinent, and requiring constant nursing care and attention.  -SB (r) ES (t) SB (c)     Row Name 03/13/21 1202          Functional Assessment    Outcome Measure Options  AM-PAC 6 Clicks Basic Mobility (PT);Modified [place]  -SB (r) ES (t) SB (c)       User Key  (r) = Recorded By, (t) = Taken By, (c) = Cosigned By    Initials Name Provider Type    SS Zhen Correa, RN Registered Nurse    Tari Ramos, PT DPT Physical Therapist    Alma Rasmussen, PT Student PT Student          PT Recommendation and Plan     Plan of Care Reviewed With: patient  Progress: no change  Outcome Summary: PT evaluation completed. Pt dx with CVA. Pt was nonverbal therefore unable to obtain alertness and orientation. Pt was repostioned in bed via dependent scooting. Pt demonstrates withdrawl from painful stimuli bilaterally. B PROM WFL, and patient able to demonstrate AAROM with all L LE movements. Senstation was unable to be properly assessed. Pt does not meet needs to continue with PT due to inability to follow commands. PT to sign off, nursing to reconsult pending change in pt status. Recommend discharge to SNF.     Time Calculation:   PT Charges     Row Name 03/13/21 1202             Time Calculation    Start Time  1102  -SB (r) ES (t) SB (c)      Stop Time  1131 12 minute chart review for total of 41 minutes  -SB (r) ES (t) SB (c)      Time Calculation (min)  29 min  -SB (r) ES (t)      PT Received On  03/13/21  -SB (r) ES (t) SB (c)        User Key  (r) = Recorded By, (t) = Taken By, (c) = Cosigned By    Initials Name Provider Type    Tari Ramos, PT DPT Physical Therapist    Alma Rasmussen, PT Student PT Student            PT G-Codes  Outcome Measure Options: AM-PAC 6 Clicks Basic Mobility  (PT), Modified [place]  AM-PAC 6 Clicks Score (PT): 6  AM-PAC 6 Clicks Score (OT): (P) 6  Modified [place] Scale: 5 - Severe disability.  Bedridden, incontinent, and requiring constant nursing care and attention.    Alma Chung, PT Student  3/13/2021

## 2021-03-14 LAB
ANION GAP SERPL CALCULATED.3IONS-SCNC: 9 MMOL/L (ref 5–15)
BASOPHILS # BLD AUTO: 0.04 10*3/MM3 (ref 0–0.2)
BASOPHILS NFR BLD AUTO: 0.4 % (ref 0–1.5)
BUN SERPL-MCNC: 16 MG/DL (ref 8–23)
BUN/CREAT SERPL: 22.5 (ref 7–25)
CALCIUM SPEC-SCNC: 8.9 MG/DL (ref 8.6–10.5)
CHLORIDE SERPL-SCNC: 102 MMOL/L (ref 98–107)
CO2 SERPL-SCNC: 27 MMOL/L (ref 22–29)
CREAT SERPL-MCNC: 0.71 MG/DL (ref 0.57–1)
DEPRECATED RDW RBC AUTO: 41.9 FL (ref 37–54)
EOSINOPHIL # BLD AUTO: 0.17 10*3/MM3 (ref 0–0.4)
EOSINOPHIL NFR BLD AUTO: 1.7 % (ref 0.3–6.2)
ERYTHROCYTE [DISTWIDTH] IN BLOOD BY AUTOMATED COUNT: 13.2 % (ref 12.3–15.4)
GFR SERPL CREATININE-BSD FRML MDRD: 80 ML/MIN/1.73
GLUCOSE BLDC GLUCOMTR-MCNC: 155 MG/DL (ref 70–130)
GLUCOSE BLDC GLUCOMTR-MCNC: 170 MG/DL (ref 70–130)
GLUCOSE BLDC GLUCOMTR-MCNC: 177 MG/DL (ref 70–130)
GLUCOSE BLDC GLUCOMTR-MCNC: 201 MG/DL (ref 70–130)
GLUCOSE BLDC GLUCOMTR-MCNC: 205 MG/DL (ref 70–130)
GLUCOSE SERPL-MCNC: 186 MG/DL (ref 65–99)
HCT VFR BLD AUTO: 38.5 % (ref 34–46.6)
HGB BLD-MCNC: 12.2 G/DL (ref 12–15.9)
IMM GRANULOCYTES # BLD AUTO: 0.09 10*3/MM3 (ref 0–0.05)
IMM GRANULOCYTES NFR BLD AUTO: 0.9 % (ref 0–0.5)
LYMPHOCYTES # BLD AUTO: 1.52 10*3/MM3 (ref 0.7–3.1)
LYMPHOCYTES NFR BLD AUTO: 15.4 % (ref 19.6–45.3)
MAGNESIUM SERPL-MCNC: 1.9 MG/DL (ref 1.6–2.4)
MCH RBC QN AUTO: 27.4 PG (ref 26.6–33)
MCHC RBC AUTO-ENTMCNC: 31.7 G/DL (ref 31.5–35.7)
MCV RBC AUTO: 86.3 FL (ref 79–97)
MONOCYTES # BLD AUTO: 0.58 10*3/MM3 (ref 0.1–0.9)
MONOCYTES NFR BLD AUTO: 5.9 % (ref 5–12)
NEUTROPHILS NFR BLD AUTO: 7.46 10*3/MM3 (ref 1.7–7)
NEUTROPHILS NFR BLD AUTO: 75.7 % (ref 42.7–76)
NRBC BLD AUTO-RTO: 0 /100 WBC (ref 0–0.2)
PHOSPHATE SERPL-MCNC: 2.9 MG/DL (ref 2.5–4.5)
PLATELET # BLD AUTO: 382 10*3/MM3 (ref 140–450)
PMV BLD AUTO: 11.3 FL (ref 6–12)
POTASSIUM SERPL-SCNC: 3.8 MMOL/L (ref 3.5–5.2)
RBC # BLD AUTO: 4.46 10*6/MM3 (ref 3.77–5.28)
SODIUM SERPL-SCNC: 138 MMOL/L (ref 136–145)
WBC # BLD AUTO: 9.86 10*3/MM3 (ref 3.4–10.8)

## 2021-03-14 PROCEDURE — 63710000001 INSULIN REGULAR HUMAN PER 5 UNITS: Performed by: INTERNAL MEDICINE

## 2021-03-14 PROCEDURE — 83735 ASSAY OF MAGNESIUM: CPT | Performed by: INTERNAL MEDICINE

## 2021-03-14 PROCEDURE — 80048 BASIC METABOLIC PNL TOTAL CA: CPT | Performed by: INTERNAL MEDICINE

## 2021-03-14 PROCEDURE — 82962 GLUCOSE BLOOD TEST: CPT

## 2021-03-14 PROCEDURE — 25010000002 VANCOMYCIN 10 G RECONSTITUTED SOLUTION: Performed by: INTERNAL MEDICINE

## 2021-03-14 PROCEDURE — 84100 ASSAY OF PHOSPHORUS: CPT | Performed by: INTERNAL MEDICINE

## 2021-03-14 PROCEDURE — 25010000002 HYDRALAZINE PER 20 MG: Performed by: INTERNAL MEDICINE

## 2021-03-14 PROCEDURE — 85025 COMPLETE CBC W/AUTO DIFF WBC: CPT | Performed by: INTERNAL MEDICINE

## 2021-03-14 PROCEDURE — 63710000001 INSULIN DETEMIR PER 5 UNITS: Performed by: INTERNAL MEDICINE

## 2021-03-14 PROCEDURE — 99233 SBSQ HOSP IP/OBS HIGH 50: CPT | Performed by: PSYCHIATRY & NEUROLOGY

## 2021-03-14 RX ORDER — LOSARTAN POTASSIUM 50 MG/1
50 TABLET ORAL DAILY
Status: DISCONTINUED | OUTPATIENT
Start: 2021-03-14 | End: 2021-03-19 | Stop reason: HOSPADM

## 2021-03-14 RX ORDER — ENALAPRILAT 2.5 MG/2ML
0.62 INJECTION INTRAVENOUS EVERY 6 HOURS PRN
Status: DISCONTINUED | OUTPATIENT
Start: 2021-03-14 | End: 2021-03-19 | Stop reason: HOSPADM

## 2021-03-14 RX ORDER — LOSARTAN POTASSIUM 50 MG/1
100 TABLET ORAL DAILY
Status: DISCONTINUED | OUTPATIENT
Start: 2021-03-14 | End: 2021-03-14

## 2021-03-14 RX ORDER — TRAZODONE HYDROCHLORIDE 50 MG/1
50 TABLET ORAL NIGHTLY
Status: DISCONTINUED | OUTPATIENT
Start: 2021-03-14 | End: 2021-03-19

## 2021-03-14 RX ADMIN — DONEPEZIL HYDROCHLORIDE 5 MG: 5 TABLET, FILM COATED ORAL at 21:17

## 2021-03-14 RX ADMIN — HUMAN INSULIN 4 UNITS: 100 INJECTION, SOLUTION SUBCUTANEOUS at 12:22

## 2021-03-14 RX ADMIN — TRAZODONE HYDROCHLORIDE 50 MG: 50 TABLET ORAL at 21:17

## 2021-03-14 RX ADMIN — ATORVASTATIN CALCIUM 80 MG: 40 TABLET, FILM COATED ORAL at 21:17

## 2021-03-14 RX ADMIN — INSULIN DETEMIR 20 UNITS: 100 INJECTION, SOLUTION SUBCUTANEOUS at 21:27

## 2021-03-14 RX ADMIN — ASPIRIN 300 MG: 300 SUPPOSITORY RECTAL at 10:23

## 2021-03-14 RX ADMIN — HYDRALAZINE HYDROCHLORIDE 5 MG: 20 INJECTION INTRAMUSCULAR; INTRAVENOUS at 01:05

## 2021-03-14 RX ADMIN — HUMAN INSULIN 4 UNITS: 100 INJECTION, SOLUTION SUBCUTANEOUS at 21:28

## 2021-03-14 RX ADMIN — ENALAPRILAT 0.62 MG: 1.25 INJECTION INTRAVENOUS at 05:10

## 2021-03-14 RX ADMIN — METOPROLOL TARTRATE 25 MG: 25 TABLET, FILM COATED ORAL at 21:17

## 2021-03-14 RX ADMIN — HUMAN INSULIN 2 UNITS: 100 INJECTION, SOLUTION SUBCUTANEOUS at 05:17

## 2021-03-14 RX ADMIN — VANCOMYCIN HYDROCHLORIDE 1500 MG: 10 INJECTION, POWDER, LYOPHILIZED, FOR SOLUTION INTRAVENOUS at 14:54

## 2021-03-14 RX ADMIN — HUMAN INSULIN 2 UNITS: 100 INJECTION, SOLUTION SUBCUTANEOUS at 17:02

## 2021-03-14 RX ADMIN — HUMAN INSULIN 2 UNITS: 100 INJECTION, SOLUTION SUBCUTANEOUS at 00:04

## 2021-03-14 NOTE — PROGRESS NOTES
Discharge Planning Assessment  [facility]     Patient Name: Val Gonzalez  MRN: [id number]  Today's Date: 3/14/2021    Admit Date: 3/12/2021    Discharge Needs Assessment     Row Name 03/14/21 0923       Living Environment    Lives With  facility resident    Current Living Arrangements  extended care facility    Primary Care Provided by  -- facility cares for pt    Provides Primary Care For  no one    Family Caregiver if Needed  -- facility cares for pt    Quality of Family Relationships  helpful;involved;supportive    Able to Return to Prior Arrangements  yes       Resource/Environmental Concerns    Resource/Environmental Concerns  none    Transportation Concerns  car, none       Transition Planning    Patient/Family Anticipates Transition to  inpatient rehabilitation facility    Patient/Family Anticipated Services at Transition  skilled nursing    Transportation Anticipated  health plan transportation       Discharge Needs Assessment    Readmission Within the Last 30 Days  no previous admission in last 30 days    Equipment Currently Used at Home  -- facility provides all that is needed    Concerns to be Addressed  denies needs/concerns at this time;no discharge needs identified    Anticipated Changes Related to Illness  none    Equipment Needed After Discharge  none    Discharge Coordination/Progress  Pt has RX coverage and a PCP. Pt is a resident of [facility]. SW attempted to find out about bedhold but Zach from facility states that SW will have to check back on Monday. SW will follow and find out about bedhold tomorrow          Discharge Plan    No documentation.       Continued Care and Services - Admitted Since 3/12/2021     Destination Coordination complete    Service Provider Request Status Selected Services Address Phone Fax Patient Preferred    [facility]   Selected Skilled Nursing [address] [id number] [phone] --                 Demographic Summary    No documentation.       Functional Status    No documentation.       Psychosocial    No documentation.       Abuse/Neglect    No documentation.       Legal    No documentation.       Substance Abuse    No documentation.       Patient Forms    No documentation.           Dulce Maria Chaney

## 2021-03-14 NOTE — PROGRESS NOTES
[facility]  INPATIENT PROGRESS NOTE    Patient Name: Val Gonzalez  Date of Admission: 3/12/2021  Today's Date: 03/14/21  Length of Stay: 2  Primary Care Physician: Provider, No Known    Subjective   Chief Complaint:   F/u CVA    HPI   Son is at bedside.  Patient continues to have left gaze preference.  Per the son she was answering some questions and talking to him a little bit earlier but she is nonverbal for me.  There is also reports that yesterday she talked to her other son in a similar fashion.  She continues to follow some commands for me.  She has not been doing well p.o. wise and nursing has been unable to feed her regular meds.  She has been afebrile overnight.  No arrhythmias.    Review of Systems   Unable to obtain due to patient factors    Objective    Temp:  [97.6 °F (36.4 °C)-98.8 °F (37.1 °C)] 98.8 °F (37.1 °C)  Heart Rate:  [66-88] 78  Resp:  [15-20] 15  BP: (119-178)/(59-93) 152/73  Physical Exam  GEN: Awake but non-verbal, following some commands, NAD  HEENT: PERRLA, Anicteric, Trachea midline  Lungs: CTAB, no wheezing/rales/rhonchi  Heart: RRR, +S1/s2, no rub  ABD: obese, soft, nt, +BS, no guarding/rebound  Extremities: no cyanosis or overt edema  Skin: gluteal wounds POA, no petechiae   Neuro: exam is limited, following some commands, L gaze preference, R sided weakness, squeezes on L      Results Review:  I have reviewed the labs, radiology results, and diagnostic studies.    Laboratory Data:   Results from last 7 days   Lab Units 03/14/21  0132 03/13/21  0205 03/12/21  1410   WBC 10*3/mm3 9.86 10.01 11.73*   HEMOGLOBIN g/dL 12.2 11.5* 12.9   HEMATOCRIT % 38.5 35.5 40.6   PLATELETS 10*3/mm3 382 311 349        Results from last 7 days   Lab Units 03/14/21  0132 03/13/21  0205 03/12/21  1410   SODIUM mmol/L 138 136 137   POTASSIUM mmol/L 3.8 4.5 5.2   CHLORIDE mmol/L 102 100 100   CO2 mmol/L 27.0 26.0 28.0   BUN mg/dL 16 26* 29*   CREATININE  mg/dL 0.71 0.91 1.06*   CALCIUM mg/dL 8.9 8.6 9.2   BILIRUBIN mg/dL  --  0.4 0.4   ALK PHOS U/L  --  92 107   ALT (SGPT) U/L  --  33 39*   AST (SGOT) U/L  --  22 33*   GLUCOSE mg/dL 186* 341* 335*       Culture Data:   No results found for: BLOODCX, URINECX, WOUNDCX, MRSACX, RESPCX, STOOLCX    Radiology Data:   Imaging Results (Last 24 Hours)     ** No results found for the last 24 hours. **          I have reviewed the patient's current medications.     Assessment/Plan     Active Hospital Problems    Diagnosis    • **Acute CVA (cerebrovascular accident) (CMS/HCC)    • Benign essential HTN    • Hyperlipidemia    • DM2 (diabetes mellitus, type 2) (CMS/[facility])    • Altered mental status        #1 acute CVA -on MRI found to have acute CVA multifocal predominantly in the left MCA distribution.  Also has chronic old right occipital lobe infarct.  Was admitted to the ICU last night due to poor GCS.  She has done okay so far but p.o. intake remains an issue.  Echo with preserved EF and negative saline study.  Her NICS have not yet been done.  Continue aspirin and statin.  If unable to get her to eat today may need to consider an NG tube for meds and nutrition.  She may eventually need a PEG if she has not improved.  Continue PT OT speech.  Neuro following.    #2 fever -unclear etiology as there was no significant white count on arrival.  Chest x-ray clear.  Urinalysis not overly infectious.  Abdomen benign.  ? neurologic.  However yesterday one of her 2 blood cultures came back positive for staph.  He does not yet speciated.  Due to that she was put on vancomycin.  We will follow up cultures.    #3 essential hypertension -we will start to control her blood pressures little more today.  Unclear she will take any p.o. medications.  Will have IV as needed's.    #4 hyperlipidemia -per history.  Lipid panel shows fairly well-controlled cholesterol numbers.  Low HDL.  LDL 73.    #5 DM 2 -continue Levemir 20 units at night with  sliding scale and hypoglycemia protocol.    #6 altered mental status -with history of dementia but also potentially compounded by her stroke.  Again following up for any bacteremia or from infections as well as above.    Discharge Planning: Ongoing work-up and care.  Okay to transfer out of the ICU today.  Continue therapies.    Electronically signed by Jose Anotnio Hi DO, 03/14/21, 07:42 CDT.

## 2021-03-14 NOTE — PROGRESS NOTES
Neurology Progress Note      Chief Complaint:  stroke    Subjective     Subjective:    No events overnight.  Coughing today but lung sounds are clear on exam.  Afebrile overnight.  ST has placed her on a modified diet.    Medications:  Current Facility-Administered Medications   Medication Dose Route Frequency Provider Last Rate Last Admin   • acetaminophen (TYLENOL) tablet 650 mg  650 mg Oral Q4H PRN Jose Antonio Hi DO        Or   • acetaminophen (TYLENOL) suppository 650 mg  650 mg Rectal Q4H PRN Jose Antonio Hi DO   650 mg at 03/13/21 0337   • aspirin chewable tablet 81 mg  81 mg Oral Daily Jose Antonio Hi DO        Or   • aspirin suppository 300 mg  300 mg Rectal Daily Jose Antonio Hi DO   300 mg at 03/13/21 1659   • atorvastatin (LIPITOR) tablet 80 mg  80 mg Oral Nightly Jose Antonio Hi DO       • dextrose (D50W) 25 g/ 50mL Intravenous Solution 25 g  25 g Intravenous Q15 Min PRN Jose Antonio Hi DO       • dextrose (GLUTOSE) oral gel 15 g  15 g Oral Q15 Min PRN Jose Antonio Hi DO       • donepezil (ARICEPT) tablet 5 mg  5 mg Oral Nightly Jose Antonio Hi DO       • enalaprilat (VASOTEC) injection 0.625 mg  0.625 mg Intravenous Q6H PRN Nikolai Cole DO   0.625 mg at 03/14/21 0510   • glucagon (human recombinant) (GLUCAGEN DIAGNOSTIC) injection 1 mg  1 mg Subcutaneous Q15 Min PRN Jose Antonio Hi DO       • hydrALAZINE (APRESOLINE) injection 5 mg  5 mg Intravenous Q6H PRN Nikolai Cole DO   5 mg at 03/14/21 0105   • insulin detemir (LEVEMIR) injection 20 Units  20 Units Subcutaneous Nightly Jose Antonio Hi DO   20 Units at 03/13/21 2010   • insulin regular (humuLIN R,novoLIN R) injection 0-9 Units  0-9 Units Subcutaneous Q6H Jose Antonio Hi DO   2 Units at 03/14/21 0517   • labetalol (NORMODYNE,TRANDATE) injection 20 mg  20 mg Intravenous Q4H PRN Jose Antonio Hi DO   20 mg at 03/12/21 1911   • levothyroxine (SYNTHROID, LEVOTHROID) tablet 75 mcg  75 mcg Oral Q AM Jose Antonio Hi  DO DILIA       • ondansetron (ZOFRAN) injection 4 mg  4 mg Intravenous Q6H PRN Jose Antonio Hi DO       • sodium chloride 0.9 % flush 10 mL  10 mL Intravenous PRN Jorje Dawn Jr., MD       • sodium chloride 0.9 % flush 10 mL  10 mL Intravenous Q12H Jose Antonio Hi DO   10 mL at 03/13/21 2011   • sodium chloride 0.9 % flush 10 mL  10 mL Intravenous PRN Jose Antonio Hi DO       • sodium chloride 0.9 % flush 10 mL  10 mL Intravenous Q12H Jose Antonio Hi DO   10 mL at 03/13/21 2012   • sodium chloride 0.9 % flush 10 mL  10 mL Intravenous PRN Jose Antonio Hi DO       • sodium chloride 0.9 % infusion  75 mL/hr Intravenous Continuous Jose Antonio Hi DO 75 mL/hr at 03/13/21 1849 75 mL/hr at 03/13/21 1849   • vancomycin 1500 mg/500 mL 0.9% NS IVPB ([facility])  15 mg/kg Intravenous Q24H Jose Antonio Hi DO           Review of Systems:   -A 14 point review of systems is completed and is negative except for stroke      Objective      Vital Signs  Temp:  [97.6 °F (36.4 °C)-98.8 °F (37.1 °C)] 98.6 °F (37 °C)  Heart Rate:  [66-88] 83  Resp:  [15-20] 18  BP: (119-178)/(59-95) 169/95    Physical Exam:    General Exam:  Head:  Normal cephalic, atraumatic  HEENT:  Neck supple  Fundoscopic Exam:  No signs of disc edema  CVS:  Regular rate and rhythm.  No murmurs  Carotid Examination:  No bruits  Lungs:  Clear to auscultation  Abdomen:  Non-tender, Non-distended  Extremities:  No signs of peripheral edema  Skin:  No rashes     Neurologic Exam:     The patient is somnolent but awakens with any stimulation  Global aphasia  No command following     Pupils are equal  Left gaze preference with inability only to go to the midline  Corneal reflexes intact  Glabella reflex is negative  Corneal and gag's are intact     Left arm and leg have full strength  Right arm has 1-5 strength with right leg 2 out of 5  DTR reflex evaluation shows evidence of an upgoing toe on the right side     Since examination reveals no neglect as she does  withdrawal in all 4 extremities     Coordination gait examination does show evidence of a resting tremor in the left upper extremity with some cogwheel rigidity        Results Review:    I reviewed the patient's new clinical results.    Results from last 7 days   Lab Units 03/14/21  0132 03/13/21  0205 03/12/21  1410   WBC 10*3/mm3 9.86 10.01 11.73*   HEMOGLOBIN g/dL 12.2 11.5* 12.9   HEMATOCRIT % 38.5 35.5 40.6   PLATELETS 10*3/mm3 382 311 349        Results from last 7 days   Lab Units 03/14/21  0132 03/13/21  0205 03/12/21  1410   SODIUM mmol/L 138 136 137   POTASSIUM mmol/L 3.8 4.5 5.2   CHLORIDE mmol/L 102 100 100   CO2 mmol/L 27.0 26.0 28.0   BUN mg/dL 16 26* 29*   CREATININE mg/dL 0.71 0.91 1.06*   CALCIUM mg/dL 8.9 8.6 9.2   BILIRUBIN mg/dL  --  0.4 0.4   ALK PHOS U/L  --  92 107   ALT (SGPT) U/L  --  33 39*   AST (SGOT) U/L  --  22 33*   GLUCOSE mg/dL 186* 341* 335*        Lab Results   Component Value Date    PHOS 2.9 03/14/2021    MG 1.9 03/14/2021    PROTIME 13.4 03/12/2021    INR 1.06 03/12/2021     No components found for: POCGLUC  No components found for: A1C  Lab Results   Component Value Date    HDL 26 (L) 03/13/2021    LDL 73 03/13/2021     No components found for: B12  No results found for: TSH        MRI brain reviewed by me.  There is a subacute infarct in the right parietal-occipital lobe just adjacent to the posterior horn of the right lateral ventricle.  It is positive on DWI but negative on ADC.  The more impressive feature is a moderate size left MCA stroke which extends from the posterior portions of the left frontal lobe vertical up into the vertex and in a parasagittal position near the vertex likely crossing the homunculus.  There is no evidence of hemorrhagic conversion nor shift.     CUS:  Pending  TTE: unremarkable  LDL:  73  HbA1c:  10.6     Assessment/Plan     Hospital Problem List      Acute CVA (cerebrovascular accident) (CMS/HCC)    Benign essential HTN    Hyperlipidemia    DM2  (diabetes mellitus, type 2) (CMS/[facility])    Altered mental status    Impression:  · Acute left MCA stroke  · Subacute right parietal stroke  · Mild hyperlipidemia  · Diabetes mellitus with poor control with hemoglobin A1c above the goal of 7  · Global aphasia  · Right hemiparesis  · Dementia  · Left sided tremor and tone concerning for PD    Plan:  · Continue aspirin for now until we can determine whether she will require a PEG tube  · Even though ST has cleared her for modified diet, she remains somnolent and is also coughing occasionally.  · May consider transition to Plavix at discharge  · Continue cardiac telemetry looking for signs of atrial fibrillation  · Increase glycemic control  · carotid ultrasound pending  · Lipitor 40 mg daily  · Continue Aricept  · Poor prognosis of functional neurologic outcome given the diagnosis of underlying dementia, likely Parkinson's Disease, and now with a dominant hemisphere stroke.        Wallace Angel MD  03/14/21  09:19 CDT

## 2021-03-14 NOTE — PLAN OF CARE
Goal Outcome Evaluation:  Plan of Care Reviewed With: patient  Progress: no change  Outcome Summary: Pt admitted to floor from unit today. Disoriented x4, nonverbal. BM today. Vanc given. Turned Q2. Purwick in place. BG tx and maintained per orders. Safety maintained.

## 2021-03-15 ENCOUNTER — APPOINTMENT (OUTPATIENT)
Dept: ULTRASOUND IMAGING | Facility: HOSPITAL | Age: 76
End: 2021-03-15

## 2021-03-15 LAB
ANION GAP SERPL CALCULATED.3IONS-SCNC: 13 MMOL/L (ref 5–15)
BACTERIA BLD CULT: ABNORMAL
BACTERIA SPEC AEROBE CULT: ABNORMAL
BUN SERPL-MCNC: 13 MG/DL (ref 8–23)
BUN/CREAT SERPL: 18.1 (ref 7–25)
CALCIUM SPEC-SCNC: 8.9 MG/DL (ref 8.6–10.5)
CHLORIDE SERPL-SCNC: 99 MMOL/L (ref 98–107)
CO2 SERPL-SCNC: 25 MMOL/L (ref 22–29)
CREAT SERPL-MCNC: 0.72 MG/DL (ref 0.57–1)
GFR SERPL CREATININE-BSD FRML MDRD: 79 ML/MIN/1.73
GLUCOSE BLDC GLUCOMTR-MCNC: 204 MG/DL (ref 70–130)
GLUCOSE BLDC GLUCOMTR-MCNC: 230 MG/DL (ref 70–130)
GLUCOSE BLDC GLUCOMTR-MCNC: 239 MG/DL (ref 70–130)
GLUCOSE BLDC GLUCOMTR-MCNC: 278 MG/DL (ref 70–130)
GLUCOSE SERPL-MCNC: 247 MG/DL (ref 65–99)
GRAM STN SPEC: ABNORMAL
ISOLATED FROM: ABNORMAL
POTASSIUM SERPL-SCNC: 3.4 MMOL/L (ref 3.5–5.2)
QT INTERVAL: 414 MS
QTC INTERVAL: 449 MS
SODIUM SERPL-SCNC: 137 MMOL/L (ref 136–145)
WHOLE BLOOD HOLD SPECIMEN: NORMAL

## 2021-03-15 PROCEDURE — 92526 ORAL FUNCTION THERAPY: CPT | Performed by: SPEECH-LANGUAGE PATHOLOGIST

## 2021-03-15 PROCEDURE — 63710000001 INSULIN REGULAR HUMAN PER 5 UNITS: Performed by: INTERNAL MEDICINE

## 2021-03-15 PROCEDURE — 99232 SBSQ HOSP IP/OBS MODERATE 35: CPT | Performed by: CLINICAL NURSE SPECIALIST

## 2021-03-15 PROCEDURE — 82962 GLUCOSE BLOOD TEST: CPT

## 2021-03-15 PROCEDURE — 93880 EXTRACRANIAL BILAT STUDY: CPT

## 2021-03-15 PROCEDURE — 63710000001 INSULIN DETEMIR PER 5 UNITS: Performed by: INTERNAL MEDICINE

## 2021-03-15 PROCEDURE — 80048 BASIC METABOLIC PNL TOTAL CA: CPT | Performed by: INTERNAL MEDICINE

## 2021-03-15 PROCEDURE — 93880 EXTRACRANIAL BILAT STUDY: CPT | Performed by: SURGERY

## 2021-03-15 RX ORDER — POTASSIUM CHLORIDE 750 MG/1
20 CAPSULE, EXTENDED RELEASE ORAL ONCE
Status: COMPLETED | OUTPATIENT
Start: 2021-03-15 | End: 2021-03-15

## 2021-03-15 RX ORDER — ATORVASTATIN CALCIUM 40 MG/1
40 TABLET, FILM COATED ORAL NIGHTLY
Status: DISCONTINUED | OUTPATIENT
Start: 2021-03-15 | End: 2021-03-19 | Stop reason: HOSPADM

## 2021-03-15 RX ADMIN — SODIUM CHLORIDE, PRESERVATIVE FREE 10 ML: 5 INJECTION INTRAVENOUS at 10:05

## 2021-03-15 RX ADMIN — ATORVASTATIN CALCIUM 40 MG: 40 TABLET, FILM COATED ORAL at 22:15

## 2021-03-15 RX ADMIN — LOSARTAN POTASSIUM 50 MG: 50 TABLET, FILM COATED ORAL at 10:02

## 2021-03-15 RX ADMIN — INSULIN DETEMIR 30 UNITS: 100 INJECTION, SOLUTION SUBCUTANEOUS at 22:14

## 2021-03-15 RX ADMIN — TRAZODONE HYDROCHLORIDE 50 MG: 50 TABLET ORAL at 22:15

## 2021-03-15 RX ADMIN — POTASSIUM CHLORIDE 20 MEQ: 750 CAPSULE, EXTENDED RELEASE ORAL at 16:52

## 2021-03-15 RX ADMIN — DONEPEZIL HYDROCHLORIDE 5 MG: 5 TABLET, FILM COATED ORAL at 22:15

## 2021-03-15 RX ADMIN — HUMAN INSULIN 6 UNITS: 100 INJECTION, SOLUTION SUBCUTANEOUS at 17:09

## 2021-03-15 RX ADMIN — LEVOTHYROXINE SODIUM 75 MCG: 75 TABLET ORAL at 05:32

## 2021-03-15 RX ADMIN — METOPROLOL TARTRATE 25 MG: 25 TABLET, FILM COATED ORAL at 10:02

## 2021-03-15 RX ADMIN — HUMAN INSULIN 4 UNITS: 100 INJECTION, SOLUTION SUBCUTANEOUS at 12:33

## 2021-03-15 RX ADMIN — METOPROLOL TARTRATE 25 MG: 25 TABLET, FILM COATED ORAL at 22:15

## 2021-03-15 RX ADMIN — SODIUM CHLORIDE, PRESERVATIVE FREE 10 ML: 5 INJECTION INTRAVENOUS at 22:16

## 2021-03-15 RX ADMIN — ASPIRIN 81 MG: 81 TABLET, CHEWABLE ORAL at 10:03

## 2021-03-15 RX ADMIN — SODIUM CHLORIDE, PRESERVATIVE FREE 10 ML: 5 INJECTION INTRAVENOUS at 22:17

## 2021-03-15 NOTE — PLAN OF CARE
Goal Outcome Evaluation:  Plan of Care Reviewed With: patient, other (see comments) (MIRANDA Vinson)  Progress: improving       Swallow treatment completed. The patient is alert and cooperative. She completed trials of nectar thick, thin liquids, and attempted solid trial. 1x delayed cough following 3 consecutive swallows of thin liquids. At least 4 more ounces of water trialed with no overt s/s of aspiration. Patient sucked on cracker and was unable to bite from solid trial. Patient OK to upgrade to thin liquids and continue puree diet. Will follow up to ensure diet toleration.     Bria Mcpherson MS CCC-SLP 3/15/2021 13:31 CDT

## 2021-03-15 NOTE — PROGRESS NOTES
"  Neurology Progress Note      Chief Complaint:  F/u stroke    Subjective     Subjective:  Lying in bed. No family at bedside. Patient turns her head to right when I know and enter the room on the right. Patient attempts to speak and repeats \"well\".            Results for orders placed during the hospital encounter of 03/12/21    Adult Transthoracic Echo Complete W/ Cont if Necessary Per Protocol (With Agitated Saline)    Interpretation Summary  · Left ventricular ejection fraction appears to be greater than 70%. Left ventricular systolic function is hyperdynamic (EF > 70%).  · Normal size and function of right ventricle.  · No significant valvular pathology.  · Saline test results are negative.    Medications:  Current Facility-Administered Medications   Medication Dose Route Frequency Provider Last Rate Last Admin   • acetaminophen (TYLENOL) tablet 650 mg  650 mg Oral Q4H PRN Jose Antonio Hi DO        Or   • acetaminophen (TYLENOL) suppository 650 mg  650 mg Rectal Q4H PRN Jose Antonio Hi DO   650 mg at 03/13/21 0337   • aspirin chewable tablet 81 mg  81 mg Oral Daily Jose Antonio Hi DO   81 mg at 03/15/21 1003    Or   • aspirin suppository 300 mg  300 mg Rectal Daily Jose Antonio Hi DO   300 mg at 03/14/21 1023   • atorvastatin (LIPITOR) tablet 80 mg  80 mg Oral Nightly Jose Antonio Hi DO   80 mg at 03/14/21 2117   • dextrose (D50W) 25 g/ 50mL Intravenous Solution 25 g  25 g Intravenous Q15 Min PRN Jose Antonio Hi DO       • dextrose (GLUTOSE) oral gel 15 g  15 g Oral Q15 Min PRN Jose Antonio Hi DO       • donepezil (ARICEPT) tablet 5 mg  5 mg Oral Nightly Jose Antonio Hi DO   5 mg at 03/14/21 2117   • enalaprilat (VASOTEC) injection 0.625 mg  0.625 mg Intravenous Q6H PRN Jose Antonio Hi DO   0.625 mg at 03/14/21 0510   • glucagon (human recombinant) (GLUCAGEN DIAGNOSTIC) injection 1 mg  1 mg Subcutaneous Q15 Min PRN Jose Antonio Hi DO       • hydrALAZINE (APRESOLINE) injection 5 mg  5 mg " Intravenous Q6H PRN Jose Antonio Hi DO   5 mg at 03/14/21 0105   • insulin detemir (LEVEMIR) injection 20 Units  20 Units Subcutaneous Nightly Jose Antonio Hi DO   20 Units at 03/14/21 2127   • insulin regular (humuLIN R,novoLIN R) injection 0-9 Units  0-9 Units Subcutaneous Q6H Jose Antonio Hi DO   4 Units at 03/14/21 2128   • labetalol (NORMODYNE,TRANDATE) injection 20 mg  20 mg Intravenous Q4H PRN Jose Antonio Hi DO   20 mg at 03/12/21 1911   • levothyroxine (SYNTHROID, LEVOTHROID) tablet 75 mcg  75 mcg Oral Q AM Jose Antonio Hi DO   75 mcg at 03/15/21 0532   • losartan (COZAAR) tablet 50 mg  50 mg Oral Daily Jose Antonio Hi DO   50 mg at 03/15/21 1002   • metoprolol tartrate (LOPRESSOR) tablet 25 mg  25 mg Oral BID Jose Antonio Hi DO   25 mg at 03/15/21 1002   • ondansetron (ZOFRAN) injection 4 mg  4 mg Intravenous Q6H PRN Jose Antonio Hi, DO       • sodium chloride 0.9 % flush 10 mL  10 mL Intravenous PRN Jose Antonio Hi DO       • sodium chloride 0.9 % flush 10 mL  10 mL Intravenous Q12H Jose Antonio Hi DO   Stopped at 03/15/21 1102   • sodium chloride 0.9 % flush 10 mL  10 mL Intravenous PRN Jose Antonio Hi DO       • sodium chloride 0.9 % flush 10 mL  10 mL Intravenous Q12H Jose Antonio Hi DO   10 mL at 03/15/21 1005   • sodium chloride 0.9 % flush 10 mL  10 mL Intravenous PRN Jose Antonio Hi DO       • traZODone (DESYREL) tablet 50 mg  50 mg Oral Nightly Jose Antonio Hi DO   50 mg at 03/14/21 2117   • vancomycin 1500 mg/500 mL 0.9% NS IVPB ([facility])  15 mg/kg Intravenous Q24H Jose Antonio Hi DO   1,500 mg at 03/14/21 1454       Review of Systems:   -A 14 point review of systems is not able to be completed secondary to aphasia.      Objective      Vital Signs  Temp:  [97.5 °F (36.4 °C)-98.4 °F (36.9 °C)] 98.2 °F (36.8 °C)  Heart Rate:  [71-92] 73  Resp:  [18] 18  BP: (119-163)/(52-84) 143/71    Telemetry: S 72-93 F PAC    Physical Exam:  General Exam:  Head:  Normocephalic,  atraumatic  HEENT:  Neck supple  Fundoscopic Exam:  No signs of disc edema  CVS:  Regular rate and rhythm.  No murmurs  Carotid Examination:  No bruits  Lungs:  Clear to auscultation  Abdomen:  Nontender, nondistended  Extremities:  No signs of peripheral edema  Skin:  No rashes    Neurologic Exam:    Mental Status:    -Alert, aphasic  - mild dysarthria when attempting to speak  -Follows some simple commands    CN II:  Visual fields with blink to threat bilateral but less on right.  Pupils equally reactive to light  CN III, IV, VI:  EOMI appear intact but does have left gaze preference. She did turn her head to right when examiner entered the room on her right side and called out her name  CN V:  Facial sensory is symmetric with no asymmetries.  CN VII:  Facial motor asymmetric with right lower facial weakness  CN VIII:  Gross hearing intact bilaterally  CN IX:  Palate elevates symmetrically  CN X:  Palate elevates symmetrically  CN XI:  Shoulder shrug asymmetric and absent on right    CN XII:  Tongue protrudes to midline  Motor: (strength out of 5:  1= minimal movement, 2 = movement in plane of gravity, 3 = movement against gravity, 4 = movement against some resistance, 5 = full strength)    -Right Upper Ext: Proximal: 0 Distal: 0  -Left Upper Ext: Proximal: 5 Distal: 5    -Right Lower Ext: Proximal: 0 Distal: 0  -Left Lower Ext: Proximal: 5 Distal: 5    DTR:  -Right   Bicep: 2+ Tricep: 2+ Brachioradialis: 2+   Patella: 2+ Ankle: 2+  Babinski  -Left   Bicep: 2+ Tricep: 2+ Brachioradialis: 2+   Patella: 2+ Ankle: 2+ Neg Babinski    Sensory:  -Intact to light touch,and withdraws to tactile stimulus      Coordination:  -no gross ataxia noted on left  Not able to perform testing on right secondary to flaccidity.     Gait  -not tested for safety reasons       Results Review:    I reviewed the patient's new clinical results.    Results from last 7 days   Lab Units 03/14/21  0132 03/13/21  0205 03/12/21  1410   WBC  10*3/mm3 9.86 10.01 11.73*   HEMOGLOBIN g/dL 12.2 11.5* 12.9   HEMATOCRIT % 38.5 35.5 40.6   PLATELETS 10*3/mm3 382 311 349        Results from last 7 days   Lab Units 03/15/21  0524 03/14/21  0132 03/13/21  0205 03/12/21  1410   SODIUM mmol/L 137 138 136 137   POTASSIUM mmol/L 3.4* 3.8 4.5 5.2   CHLORIDE mmol/L 99 102 100 100   CO2 mmol/L 25.0 27.0 26.0 28.0   BUN mg/dL 13 16 26* 29*   CREATININE mg/dL 0.72 0.71 0.91 1.06*   CALCIUM mg/dL 8.9 8.9 8.6 9.2   BILIRUBIN mg/dL  --   --  0.4 0.4   ALK PHOS U/L  --   --  92 107   ALT (SGPT) U/L  --   --  33 39*   AST (SGOT) U/L  --   --  22 33*   GLUCOSE mg/dL 247* 186* 341* 335*        Lab Results   Component Value Date    PHOS 2.9 03/14/2021    MG 1.9 03/14/2021    PROTIME 13.4 03/12/2021    INR 1.06 03/12/2021     No components found for: POCGLUC  No components found for: A1C  Lab Results   Component Value Date    HDL 26 (L) 03/13/2021    LDL 73 03/13/2021     No components found for: B12  No results found for: TSH    Assessment/Plan     Hospital Problem List      Acute CVA (cerebrovascular accident) (CMS/[facility])    Benign essential HTN    Hyperlipidemia    DM2 (diabetes mellitus, type 2) (CMS/[facility])    Altered mental status    Impression:  · Acute left MCA stroke  · Subacute right parietal stroke  · Mild hyperlipidemia. LDL 73.  · Diabetes mellitus with poor control with hemoglobin A1c above the goal of 7  · Global aphasia  · Right hemiparesis  · Dementia  · Left sided tremor and tone concerning for PD    Plan:  1. Continue ASA 81 mg for now until we can determine patient ability for oral intake. Nursing to feed patient. Even though ST has cleared patient for modified diet she had been having some coughing with eating and will need close observation.  2. Consider transition to Plavix 75 mg monotherapy at discharge  3. No atrial fibrillation detected on cardiac telemetry so far.  4. Lipitor 40 mg daily as LDL nearly at goal and based upon patient advanced age.   5. DM  management for A1C goal less than 7.  6. Continue Aricept.   7. Poor prognosis of functional neurologic outcome given the diagnosis of underlying dementia, likely Parkinson's Disease, and now with a dominant hemisphere stroke.  8. Anticipate discharge back to [facility].   9. I did speak to  Rod Gonzalez [phone] and gave update on test results and patient condition.         Kaci Bullock, ROMAIN  03/15/21  11:52 CDT

## 2021-03-15 NOTE — PROGRESS NOTES
Continued Stay Note  [facility]     Patient Name: Val Gonzalez  MRN: [id number]  Today's Date: 3/15/2021    Admit Date: 3/12/2021    Discharge Plan     Row Name 03/15/21 1140       Plan    Plan Comments  Per K[facility] at [facility], pt does have a bed hold. Plan is for return to [facility] when medically stable for DC.    Final Discharge Disposition Code  03 - skilled nursing facility (SNF)        Discharge Codes    No documentation.             BRET Rocha

## 2021-03-15 NOTE — PLAN OF CARE
Goal Outcome Evaluation:  Plan of Care Reviewed With: patient  Progress: no change  Outcome Summary: Pt alert, follows commands, but is non verbal to nurse. R extremities flaccid. Incont of urine, external catheter placed ot wall suction. Turns w assist, no new skin breakdown. Took pills crushed in applesauce last night without any problems, she did not appear to like the taste though. Sats maintained on 2L. Afebrile.

## 2021-03-15 NOTE — NURSING NOTE
Patient has been non verbal all night but this morning she is attempting to talk. No real words come out though, only garbled illogical sounds, and pt is tearful. She did eat an entire container of applesauce though with assistance.

## 2021-03-15 NOTE — PLAN OF CARE
Goal Outcome Evaluation:  Plan of Care Reviewed With: other (see comments) (RN)  Progress: no change  Outcome Summary: Diet advanced to Pureed/thin Cardiac/[facility]. Oral intake 25% of two meals. Boost Glucose control added BID. Cont to follow.

## 2021-03-15 NOTE — THERAPY TREATMENT NOTE
Acute Care - Speech Language Pathology   Swallow Treatment Note [facility]     Patient Name: Val Gonzalez  : 1945  MRN: [id number]  Today's Date: 3/15/2021               Admit Date: 3/12/2021    Swallow treatment completed. The patient is alert and cooperative. She completed trials of nectar thick, thin liquids, and attempted solid trial. 1x delayed cough following 3 consecutive swallows of thin liquids. At least 4 more ounces of water trialed with no overt s/s of aspiration. Patient sucked on cracker and was unable to bite from solid trial. Patient OK to upgrade to thin liquids and continue puree diet. Will follow up to ensure diet toleration.   Bria Mcpherson MS CCC-SLP 3/15/2021 13:32 CDT    Visit Dx:     ICD-10-CM ICD-9-CM   1. Cerebrovascular accident (CVA), unspecified mechanism (CMS/HCC)  I63.9 434.91   2. Oropharyngeal dysphagia  R13.12 787.22   3. Impaired mobility and ADLs  Z74.09 V49.89    Z78.9      Patient Active Problem List   Diagnosis   • Cerebrovascular accident (CVA) (CMS/HCC)   • Acute CVA (cerebrovascular accident) (CMS/HCC)   • Benign essential HTN   • Hyperlipidemia   • DM2 (diabetes mellitus, type 2) (CMS/HCC)   • Altered mental status     Past Medical History:   Diagnosis Date   • Altered mental status    • Alzheimer's disease (CMS/HCC)    • Arthritis    • Cerebral infarction (CMS/HCC)    • Dementia in other diseases classified elsewhere without behavioral disturbance (CMS/HCC)    • Elevated cholesterol    • Gastro-esophageal reflux disease without esophagitis    • Hemiplegia and hemiparesis following cerebral infarction affecting right dominant side (CMS/HCC)    • Hyperkalemia    • Hyperlipidemia    • Hypertension    • Long term (current) use of insulin (CMS/HCC)    • Other recurrent depressive disorders (CMS/HCC)    • Other specified hypothyroidism    • Pain, unspecified    • Stroke (CMS/HCC)    • Type 2 diabetes mellitus with hyperglycemia (CMS/HCC)      Past Surgical History:    Procedure Laterality Date   • BACK SURGERY          SWALLOW EVALUATION (last 72 hours)      SLP Adult Swallow Evaluation     Row Name 03/15/21 1229 03/13/21 0840                Rehab Evaluation    Document Type  therapy note (daily note)  -MM  evaluation  -KW       Subjective Information  no complaints  -MM  no complaints  -KW       Patient Observations  alert;cooperative;agree to therapy  -MM  lethargic;decreased LOC  -KW       Patient/Family/Caregiver Comments/Observations  Son present and then left room  -MM  no family present  -KW       Care Plan Review  care plan/treatment goals reviewed  -MM  care plan/treatment goals reviewed  -KW       Care Plan Review, Other Participant(s)  family;other (see comments) IMRANDA Vinson   -MM  caregiver  -KW       Patient Effort  adequate  -MM  adequate  -KW          General Information    Patient Profile Reviewed  --  yes  -KW       Pertinent History Of Current Problem  --  admited from NH with change in mental status, some baseline deficits from previous CVA, however continued to decline in ER with inability to follow directions  -KW       Current Method of Nutrition  --  NPO  -KW       Precautions/Limitations, Vision  --  WFL;for purposes of eval  -KW       Precautions/Limitations, Hearing  --  WFL;for purposes of eval  -KW       Prior Level of Function-Communication  --  cognitive-linguistic impairment  -KW       Prior Level of Function-Swallowing  --  unknown  -KW       Plans/Goals Discussed with  --  patient  -KW       Barriers to Rehab  --  previous functional deficit;cognitive status  -KW       Patient's Goals for Discharge  --  patient could not state  -KW          Pain    Additional Documentation  Pain Scale: FACES Pre/Post-Treatment (Group)  -MM  Pain Scale: FACES Pre/Post-Treatment (Group)  -KW          Pain Scale: FACES Pre/Post-Treatment    Pain: FACES Scale, Pretreatment  0-->no hurt  -MM  0-->no hurt  -KW       Posttreatment Pain Rating  0-->no hurt  -MM  0-->no  hurt  -KW          Oral Motor Structure and Function    Dentition Assessment  --  upper dentures/partial in place could not see bottom  -KW       Secretion Management  --  WNL/WFL  -KW       Mucosal Quality  --  dry  -KW       Volitional Swallow  --  unable to elicit  -KW       Volitional Cough  --  unable to elicit  -KW          Oral Musculature and Cranial Nerve Assessment    Oral Motor General Assessment  --  unable to assess;generalized oral motor weakness  -KW       Lingual Impairment, Detail. Cranial Nerves IX, XII (Glossopharyngeal and Hypoglossal)  --  reduced strength;reduced lingual ROM  -KW       Oral Motor, Comment  --  did appear to have generalized weakness during automatic oral motor task but could not follow directions for more indepth testing.  -KW          General Eating/Swallowing Observations    Respiratory Support Currently in Use  --  nasal cannula  -KW       Eating/Swallowing Skills  --  fed by SLP  -KW       Positioning During Eating  --  upright in bed  -KW       Utensils Used  --  spoon;straw  -KW       Consistencies Trialed  --  thin liquids;nectar/syrup-thick liquids;honey-thick liquids;pudding thick  -KW          Clinical Swallow Eval    Oral Prep Phase  --  impaired  -KW       Oral Transit  --  impaired  -KW       Oral Residue  --  WFL  -KW       Pharyngeal Phase  --  suspected pharyngeal impairment  -KW       Esophageal Phase  --  unremarkable  -KW       Clinical Swallow Evaluation Summary  --  see report at top  -          Oral Prep Concerns    Oral Prep Concerns  --  reduced lip opening;incomplete or weak lip closure around spoon  -KW       Reduced Lip Opening  --  honey;pudding  -KW       Incomplete or Weak Lip Closure Around Spoon  --  honey;pudding  -KW          Oral Transit Concerns    Oral Transit Concerns  --  increased oral transit time  -KW       Increased Oral Transit Time  --  thin;nectar;honey;pudding  -KW          Pharyngeal Phase Concerns    Pharyngeal Phase Concerns   --  cough;multiple swallows  -KW       Multiple Swallows  --  thin;nectar;honey;pudding  -KW       Cough  --  thin  -KW          Clinical Impression    Daily Summary of Progress (SLP)  progress toward functional goals as expected  -MM  --       Barriers to Overall Progress (SLP)  previous deficits  -MM  previous deficits  -KW       SLP Swallowing Diagnosis  --  moderate;oral dysphagia;pharyngeal dysphagia  -KW       Functional Impact  --  risk of aspiration/pneumonia;risk of malnutrition;risk of dehydration  -KW       Rehab Potential/Prognosis, Swallowing  --  adequate, monitor progress closely  -       Swallow Criteria for Skilled Therapeutic Interventions Met  --  demonstrates skilled criteria  -KW       Plan for Continued Treatment (SLP)  Upgrade to thin  -MM  --          Recommendations    Therapy Frequency (Swallow)  --  at least;3 days per week  -KW       Predicted Duration Therapy Intervention (Days)  --  until discharge  -       SLP Diet Recommendation  --  puree;nectar thick liquids;ice chips between meals after oral care, with supervision  -KW       Recommended Precautions and Strategies  --  upright posture during/after eating;small bites of food and sips of liquid;1:1 supervision  -KW       Oral Care Recommendations  --  Oral Care before breakfast, after meals and PRN  -KW       SLP Rec. for Method of Medication Administration  --  meds crushed;with pudding or applesauce  -       Monitor for Signs of Aspiration  --  yes;notify SLP if any concerns  -KW       Anticipated Discharge Disposition (SLP)  --  skilled nursing facility  -KW          Swallow Goals (SLP)    Oral Nutrition/Hydration Goal Selection (SLP)  oral nutrition/hydration, SLP goal 1  -MM  oral nutrition/hydration, SLP goal 1  -KW       Labial Strengthening Goal Selection (SLP)  labial strengthening, SLP goal 1  -MM  labial strengthening, SLP goal 1  -KW       Lingual Strengthening Goal Selection (SLP)  lingual strengthening, SLP goal 1   -MM  lingual strengthening, SLP goal 1  -KW       Pharyngeal Strengthening Exercise Goal Selection (SLP)  pharyngeal strengthening exercise, SLP goal 1  -MM  pharyngeal strengthening exercise, SLP goal 1  -KW       Additional Documentation  labial strengthening goal selection (SLP);lingual strengthening goal selection (SLP);pharyngeal strengthening exercise goal selection (SLP)  -MM  labial strengthening goal selection (SLP);lingual strengthening goal selection (SLP);pharyngeal strengthening exercise goal selection (SLP)  -KW          Oral Nutrition/Hydration Goal 1 (SLP)    Oral Nutrition/Hydration Goal 1, SLP  LTG: Patient will tolerate LRD with no overt s/s of aspiration  -MM  LTG: Patient will tolerate LRD with no overt s/s of aspiration  -KW       Time Frame (Oral Nutrition/Hydration Goal 1, SLP)  short term goal (STG);by discharge  -MM  short term goal (STG);by discharge  -KW       Barriers (Oral Nutrition/Hydration Goal 1, SLP)  pre-existing deficits  -MM  pre-existing deficits  -KW       Progress/Outcomes (Oral Nutrition/Hydration Goal 1, SLP)  continuing progress toward goal  -MM  goal ongoing  -KW          Labial Strengthening Goal 1 (SLP)    Activity (Labial Strengthening Goal 1, SLP)  increase labial tone  -MM  increase labial tone  -KW       Increase Labial Tone  labial resistance exercises  -MM  labial resistance exercises  -KW       [place]/Accuracy (Labial Strengthening Goal 1, SLP)  independently (over 90% accuracy)  -MM  independently (over 90% accuracy)  -KW       Time Frame (Labial Strengthening Goal 1, SLP)  short term goal (STG);by discharge  -MM  short term goal (STG);by discharge  -KW       Barriers (Labial Strengthening Goal 1, SLP)  pre-existing deficits  -MM  pre-existing deficits  -KW       Progress/Outcomes (Labial Strengthening Goal 1, SLP)  goal ongoing  -MM  goal ongoing  -KW          Lingual Strengthening Goal 1 (SLP)    Activity (Lingual Strengthening Goal 1, SLP)  increase  lingual tone/sensation/control/coordination/movement  -MM  increase lingual tone/sensation/control/coordination/movement  -KW       Increase Lingual Tone/Sensation/Control/Coordination/Movement  lingual movement exercises  -MM  lingual movement exercises  -KW       [place]/Accuracy (Lingual Strengthening Goal 1, SLP)  independently (over 90% accuracy)  -MM  independently (over 90% accuracy)  -KW       Time Frame (Lingual Strengthening Goal 1, SLP)  short term goal (STG);by discharge  -MM  short term goal (STG);by discharge  -KW       Barriers (Lingual Strengthening Goal 1, SLP)  pre-existing deficits  -MM  pre-existing deficits  -KW       Progress/Outcomes (Lingual Strengthening Goal 1, SLP)  goal ongoing  -MM  goal ongoing  -KW          Pharyngeal Strengthening Exercise Goal 1 (SLP)    Activity (Pharyngeal Strengthening Goal 1, SLP)  increase timing  -MM  increase timing  -KW       Increase Timing  gustatory stimulation (sour/cold)  -MM  gustatory stimulation (sour/cold)  -KW       [place]/Accuracy (Pharyngeal Strengthening Goal 1, SLP)  independently (over 90% accuracy)  -MM  independently (over 90% accuracy)  -KW       Time Frame (Pharyngeal Strengthening Goal 1, SLP)  short term goal (STG);by discharge  -MM  short term goal (STG);by discharge  -KW       Barriers (Pharyngeal Strengthening Goal 1, SLP)  pre-existing deficits  -MM  pre-existing deficits  -KW       Progress/Outcomes (Pharyngeal Strengthening Goal 1, SLP)  goal ongoing  -MM  goal ongoing  -KW         User Key  (r) = Recorded By, (t) = Taken By, (c) = Cosigned By    Initials Name Effective Dates    Winsome Merino, MS CCC-SLP 08/09/20 -     MM Bria Mcpherson, MS CCC-SLP 07/12/20 -           EDUCATION  The patient has been educated in the following areas:   Dysphagia (Swallowing Impairment) Oral Care/Hydration Modified Diet Instruction.    SLP Recommendation and Plan                                         Daily Summary of Progress  (SLP): progress toward functional goals as expected    Plan for Continued Treatment (SLP): Upgrade to thin              Plan of Care Reviewed With: patient, other (see comments) (MIRANDA Vinson)  Progress: improving    SLP GOALS     Row Name 03/15/21 1229 03/13/21 0840          Oral Nutrition/Hydration Goal 1 (SLP)    Oral Nutrition/Hydration Goal 1, SLP  LTG: Patient will tolerate LRD with no overt s/s of aspiration  -MM  LTG: Patient will tolerate LRD with no overt s/s of aspiration  -KW     Time Frame (Oral Nutrition/Hydration Goal 1, SLP)  short term goal (STG);by discharge  -MM  short term goal (STG);by discharge  -KW     Barriers (Oral Nutrition/Hydration Goal 1, SLP)  pre-existing deficits  -MM  pre-existing deficits  -KW     Progress/Outcomes (Oral Nutrition/Hydration Goal 1, SLP)  continuing progress toward goal  -MM  goal ongoing  -KW        Labial Strengthening Goal 1 (SLP)    Activity (Labial Strengthening Goal 1, SLP)  increase labial tone  -MM  increase labial tone  -KW     Increase Labial Tone  labial resistance exercises  -MM  labial resistance exercises  -KW     [place]/Accuracy (Labial Strengthening Goal 1, SLP)  independently (over 90% accuracy)  -MM  independently (over 90% accuracy)  -KW     Time Frame (Labial Strengthening Goal 1, SLP)  short term goal (STG);by discharge  -MM  short term goal (STG);by discharge  -KW     Barriers (Labial Strengthening Goal 1, SLP)  pre-existing deficits  -MM  pre-existing deficits  -KW     Progress/Outcomes (Labial Strengthening Goal 1, SLP)  goal ongoing  -MM  goal ongoing  -KW        Lingual Strengthening Goal 1 (SLP)    Activity (Lingual Strengthening Goal 1, SLP)  increase lingual tone/sensation/control/coordination/movement  -MM  increase lingual tone/sensation/control/coordination/movement  -KW     Increase Lingual Tone/Sensation/Control/Coordination/Movement  lingual movement exercises  -MM  lingual movement exercises  -KW     [place]/Accuracy  (Lingual Strengthening Goal 1, SLP)  independently (over 90% accuracy)  -MM  independently (over 90% accuracy)  -KW     Time Frame (Lingual Strengthening Goal 1, SLP)  short term goal (STG);by discharge  -MM  short term goal (STG);by discharge  -KW     Barriers (Lingual Strengthening Goal 1, SLP)  pre-existing deficits  -MM  pre-existing deficits  -KW     Progress/Outcomes (Lingual Strengthening Goal 1, SLP)  goal ongoing  -MM  goal ongoing  -KW        Pharyngeal Strengthening Exercise Goal 1 (SLP)    Activity (Pharyngeal Strengthening Goal 1, SLP)  increase timing  -MM  increase timing  -KW     Increase Timing  gustatory stimulation (sour/cold)  -MM  gustatory stimulation (sour/cold)  -KW     [place]/Accuracy (Pharyngeal Strengthening Goal 1, SLP)  independently (over 90% accuracy)  -MM  independently (over 90% accuracy)  -KW     Time Frame (Pharyngeal Strengthening Goal 1, SLP)  short term goal (STG);by discharge  -MM  short term goal (STG);by discharge  -KW     Barriers (Pharyngeal Strengthening Goal 1, SLP)  pre-existing deficits  -MM  pre-existing deficits  -KW     Progress/Outcomes (Pharyngeal Strengthening Goal 1, SLP)  goal ongoing  -MM  goal ongoing  -KW       User Key  (r) = Recorded By, (t) = Taken By, (c) = Cosigned By    Initials Name Provider Type    Winsome Merino MS CCC-SLP Speech and Language Pathologist    Bria Wong, MS CCC-SLP Speech and Language Pathologist             Time Calculation:   Time Calculation- SLP     Row Name 03/15/21 1331             Time Calculation- SLP    SLP Start Time  1229  -MM      SLP Stop Time  1252  -MM      SLP Time Calculation (min)  23 min  -MM      SLP Received On  03/15/21  -MM        User Key  (r) = Recorded By, (t) = Taken By, (c) = Cosigned By    Initials Name Provider Type    Bria Wong, MS CCC-SLP Speech and Language Pathologist          Therapy Charges for Today     Code Description Service Date Service Provider Modifiers Qty     [id number]  ST TREATMENT SWALLOW 2 3/15/2021 Bria Mcpherson, MS CCC-SLP GN 1               Bria Mcpherson MS CCC-SLP  3/15/2021

## 2021-03-15 NOTE — PLAN OF CARE
Goal Outcome Evaluation:     Progress: improving  Outcome Summary: Pt seems to be more alert today. [facility] orientation due to nonverbal/garbled speech. R side flaccid, RLE, RUE. Weak dorsi/plantarflexion LLE. aphasia. illogical speech. R droop. Follows commands most of the time. PPP. NIH 22. Tele - AV paced. SCD. , room air. Purewick. Feeder. Call light within reach. Safety maintained. Bed alarm set.

## 2021-03-15 NOTE — PROGRESS NOTES
1           [facility]  INPATIENT PROGRESS NOTE    Patient Name: Val Gonzalez  Date of Admission: 3/12/2021  Today's Date: 03/15/21  Length of Stay: 3  Primary Care Physician: Provider, No Known    Subjective   Chief Complaint: Follow-up       HPI   Patient was admitted for acute CVA predominantly in the left MCA distribution.  She has known history of chronic old infarct of the occipital lobe.  Apparently on March 13 she had febrile illness with T-max of 101.5.  During that same time she also had severely elevated blood pressure as high as 210/147.  Blood culture apparently showed coagulase-negative Staphylococcus in one of the anaerobic bottle of two setset.   She had a follow-up prior to knowing this, she was empirically started on vancomycin and has been afebrile since then.  Her chest x-ray is clear.  Urinalysis did not show overt infection.  Her white count is normal without any left shift.  But was initially mildly elevated at 11.7    She appears aphasic to me.  She would blurt out some words but are not insistent to answer questions.  She is not able to follow commands.   Review of Systems   Patient is unable to participate in her care due to stroke    Objective    Temp:  [98.1 °F (36.7 °C)-98.4 °F (36.9 °C)] 98.2 °F (36.8 °C)  Heart Rate:  [71-92] 73  Resp:  [18] 18  BP: (119-163)/(52-84) 143/71  Physical Exam  She has spontaneous movement of left side however the right side is flaccid.  She was able to blurt out some words but not cohesive.  She did not follow commands while I was there.  She is not in any respiratory distress although she appears uncomfortable as she kept moving her left extremities  Lung sounds are clear without any crackles or wheezes  S1-S2 regular rate and rhythm, tachycardic at 100 or beats per minute.  Telemetry showed sinus tachycardia  Soft abdomen, nontender, positive bowel sounds no guarding  Extremities no edema or  cyanosis  Previous exam by Dr. Hi indicates that she has gluteal wounds present on admission.  I am not able to check on this as she is on the heavy side and will require assistance    .    I am able to pull the image do from photo taken earlier.    Warm dry skin  Good capillary refill time    Results Review:  I have reviewed the labs, radiology results, and diagnostic studies.    Laboratory Data:   Results from last 7 days   Lab Units 03/14/21  0132 03/13/21  0205 03/12/21  1410   WBC 10*3/mm3 9.86 10.01 11.73*   HEMOGLOBIN g/dL 12.2 11.5* 12.9   HEMATOCRIT % 38.5 35.5 40.6   PLATELETS 10*3/mm3 382 311 349        Results from last 7 days   Lab Units 03/15/21  0524 03/14/21  0132 03/13/21  0205 03/12/21  1410   SODIUM mmol/L 137 138 136 137   POTASSIUM mmol/L 3.4* 3.8 4.5 5.2   CHLORIDE mmol/L 99 102 100 100   CO2 mmol/L 25.0 27.0 26.0 28.0   BUN mg/dL 13 16 26* 29*   CREATININE mg/dL 0.72 0.71 0.91 1.06*   CALCIUM mg/dL 8.9 8.9 8.6 9.2   BILIRUBIN mg/dL  --   --  0.4 0.4   ALK PHOS U/L  --   --  92 107   ALT (SGPT) U/L  --   --  33 39*   AST (SGOT) U/L  --   --  22 33*   GLUCOSE mg/dL 247* 186* 341* 335*       Culture Data:   Blood Culture   Date Value Ref Range Status   03/12/2021 No growth at 2 days  Preliminary   03/12/2021 Staphylococcus, coagulase negative (C)  Final     Comment:     Probable contaminant requires clinical correlation, susceptibility not performed unless requested by physician.         Radiology Data:   Imaging Results (Last 24 Hours)     Procedure Component Value Units Date/Time    US Carotid Bilateral [[id number]] Collected: 03/15/21 1413     Updated: 03/15/21 1417    Narrative:      History: CVA       Impression:      Impression:  1. There is less than 50% stenosis of the right internal carotid artery.  2. There is less than 50% stenosis of the left internal carotid artery.  3. Antegrade flow is demonstrated in bilateral vertebral arteries.     Comments: Bilateral carotid vertebral  arterial duplex scan was  performed.     Grayscale imaging shows intimal thickening and calcified elements at the  carotid bifurcation. The right internal carotid artery peak systolic  velocity is 111 cm/sec. The end-diastolic velocity is 22 cm/sec. The  right ICA/CCA ratio is approximately 1.0 . These findings correlate with  less than 50% stenosis of the right internal carotid artery.     Grayscale imaging shows intimal thickening and calcified elements at the  carotid bifurcation. The left internal carotid artery peak systolic  velocity is 95 cm/sec. The end-diastolic velocity is 27.8 cm/sec. The  left ICA/CCA ratio is approximately 0.6 . These findings correlate with  less than 50% stenosis of the left internal carotid artery.     Antegrade flow is demonstrated in bilateral vertebral arteries.  There is greater than 50% stenosis in the left common carotid artery and  bilateral external carotid arteries.  This report was finalized on 03/15/2021 14:14 by Dr. Jack Lorenzana MD.          I have reviewed the patient's current medications.     Assessment/Plan     Active Hospital Problems    Diagnosis    • **Acute CVA (cerebrovascular accident) (CMS/HCC)    • Benign essential HTN    • Hyperlipidemia    • DM2 (diabetes mellitus, type 2) (CMS/[facility], located [facility])    • Altered mental status          Problem list  · Multifocal acute CVA but predominantly left MCA distribution  · History of chronic old right occipital lobe infarct  · Fever now resolved - ?  Unclear whether there is a neurologic component of the febrile illness.  Patient has not received any Tylenol or Tylenol-containing medication to suppress fever in the past couple of days.  · Hypertension -blood pressure ranged from 119-158.  Patient is on losartan and metoprolol.  Monitor blood pressure and consider increasing losartan or changing the beta-blocker to carvedilol.  Hyperlipidemia -LDL 73; continue statin  · Diabetes mellitus type -blood sugar noted at 247, 204, 230.   Patient is on Levemir and sliding scale.  Her A1c level is 10.6.  Will increase Levemir.  As needed D50 for hypoglycemia  · Insulin altered mental status  · Coag negative Staphylococcus bacteremia felt to be contaminant.      Discussion/plan  DC vancomycin  Monitor off antibiotic for any systemic inflammatory response.  If remain afebrile and stable, I anticipate that she can be discharged to skilled nursing facility tomorrow.  Reviewed 's note.  Patient has a bed hold at [facility] and rehab.  Continue physical rehabilitation  Other plans as stated above    aspirin, 81 mg, Oral, Daily   Or  aspirin, 300 mg, Rectal, Daily  atorvastatin, 40 mg, Oral, Nightly  donepezil, 5 mg, Oral, Nightly  insulin detemir, 20 Units, Subcutaneous, Nightly  insulin regular, 0-9 Units, Subcutaneous, Q6H  levothyroxine, 75 mcg, Oral, Q AM  losartan, 50 mg, Oral, Daily  metoprolol tartrate, 25 mg, Oral, BID  sodium chloride, 10 mL, Intravenous, Q12H  sodium chloride, 10 mL, Intravenous, Q12H  traZODone, 50 mg, Oral, Nightly      Results for orders placed during the hospital encounter of 03/12/21    Adult Transthoracic Echo Complete W/ Cont if Necessary Per Protocol (With Agitated Saline)    Interpretation Summary  · Left ventricular ejection fraction appears to be greater than 70%. Left ventricular systolic function is hyperdynamic (EF > 70%).  · Normal size and function of right ventricle.  · No significant valvular pathology.  · Saline test results are negative.      Discharge Planning: Likely tomorrow if remains afebrile and hemodynamically stable.    Electronically signed by Abdullahi Han MD, 03/15/21, 15:45 CDT.

## 2021-03-15 NOTE — PROGRESS NOTES
"Pharmacy Dosing Service  Pharmacokinetics  Vancomycin Follow-up Evaluation    Assessment/Action/Plan:  Current Order: Vancomycin 1500 mg IVPB every 24 hours  Current end date:3/17/21  Levels: Ordered, due 1400 3/16/21  Additional antimicrobial agent(s): None    Renal function stable.  Ordered Vancomycin trough level due 1400 3/16/21.  Continue current dose of Vancomycin 1500 mg iv q 24 hrs. Pharmacy will continue to follow daily and adjust dose accordingly.     Subjective:  Val Gonzalez is a 76 y.o. female currently on Vancomycin 1500 mg IV every 24 hours for the treatment of Bacteremia, day 3 of treatment.    AUC Model Data:  Regimen: 1500 mg every 24 hours  Exposure target: AUC24 (range)400-600 mg/L.hr  AUC24,ss: 516 mg/L.hr  PAUC*: 69 %  Ctrough,ss: 12.5 mg/L  Pconc*: 30 %  Tox.: 8 %      Objective:  Ht: 172.7 cm (67.99\"); Wt: 87.7 kg (193 lb 5.5 oz)  Estimated Creatinine Clearance: 68.2 mL/min (by C-G formula based on SCr of 0.72 mg/dL).   Creatinine   Date Value Ref Range Status   03/15/2021 0.72 0.57 - 1.00 mg/dL Final   03/14/2021 0.71 0.57 - 1.00 mg/dL Final   03/13/2021 0.91 0.57 - 1.00 mg/dL Final   04/26/2019 0.6 0.5 - 1.1 mg/dL Final   04/25/2019 0.6 0.5 - 1.1 mg/dL Final   04/24/2019 0.9 0.5 - 1.1 mg/dL Final      Lab Results   Component Value Date    WBC 9.86 03/14/2021    WBC 10.01 03/13/2021    WBC 11.73 (H) 03/12/2021       No results found for: VANCOPEAK, VANCOTROUGH, VANCORANDOM    Culture Results:  Microbiology Results (last 10 days)       Procedure Component Value - Date/Time    COVID PRE-OP / PRE-PROCEDURE SCREENING ORDER (NO ISOLATION) - Swab, Nasal Cavity [[id number]]  (Normal) Collected: 03/12/21 1638    Lab Status: Final result Specimen: Swab from Nasal Cavity Updated: 03/12/21 1806    Narrative:      The following orders were created for panel order COVID PRE-OP / PRE-PROCEDURE SCREENING ORDER (NO ISOLATION) - Swab, Nasal Cavity.  Procedure                               Abnormality        "  Status                     ---------                               -----------         ------                     COVID-19,[facility] IN-[facility]ICHELLE...[[id number]]  Normal              Final result                 Please view results for these tests on the individual orders.    COVID-19,[facility] IN-HOUSE,Nasal Swab No Transport Media 3-4 HR TAT - Swab, Nasal Cavity [[id number]]  (Normal) Collected: 03/12/21 1638    Lab Status: Final result Specimen: Swab from Nasal Cavity Updated: 03/12/21 1806     COVID19 Not Detected    Narrative:      Fact sheet for providers: https://www.fda.gov/media/728430/download     Fact sheet for patients: https://www.fda.gov/media/184996/download    Test performed by PCR.    Consider negative results in combination with clinical observations, patient history, and epidemiological information.    Blood Culture - Blood, Arm, Right [[id number]] Collected: 03/12/21 1434    Lab Status: Preliminary result Specimen: Blood from Arm, Right Updated: 03/14/21 1600     Blood Culture No growth at 2 days    Blood Culture - Blood, Arm, Left [[id number]]  (Abnormal) Collected: 03/12/21 1414    Lab Status: Final result Specimen: Blood from Arm, Left Updated: 03/15/21 0738     Blood Culture Staphylococcus, coagulase negative     Comment: Probable contaminant requires clinical correlation, susceptibility not performed unless requested by physician.          Isolated from Anaerobic Bottle     Gram Stain Anaerobic Bottle Gram positive cocci    Narrative:      Probable contaminant requires clinical correlation, susceptibility not performed unless requested by physician.      Blood Culture ID, PCR - Blood, Arm, Left [[id number]]  (Abnormal) Collected: 03/12/21 1414    Lab Status: Edited Result - FINAL Specimen: Blood from Arm, Left Updated: 03/15/21 0732     BCID, PCR Staphylococcus spp, not aureus. Identification by BCID PCR.     Comment: Corrected result. Previous result was Staphylococcus aureus. Identification by BCID  PCR. on 3/13/2021 at 1227 CST.               ALTAGRACIA Veloz, [facility]   03/15/21 08:50 CDT

## 2021-03-16 ENCOUNTER — APPOINTMENT (OUTPATIENT)
Dept: CT IMAGING | Facility: HOSPITAL | Age: 76
End: 2021-03-16

## 2021-03-16 ENCOUNTER — APPOINTMENT (OUTPATIENT)
Dept: GENERAL RADIOLOGY | Facility: HOSPITAL | Age: 76
End: 2021-03-16

## 2021-03-16 LAB
ANION GAP SERPL CALCULATED.3IONS-SCNC: 8 MMOL/L (ref 5–15)
BACTERIA UR QL AUTO: ABNORMAL /HPF
BASOPHILS # BLD AUTO: 0.02 10*3/MM3 (ref 0–0.2)
BASOPHILS NFR BLD AUTO: 0.2 % (ref 0–1.5)
BILIRUB UR QL STRIP: ABNORMAL
BUN SERPL-MCNC: 12 MG/DL (ref 8–23)
BUN/CREAT SERPL: 14.3 (ref 7–25)
CALCIUM SPEC-SCNC: 8.8 MG/DL (ref 8.6–10.5)
CHLORIDE SERPL-SCNC: 98 MMOL/L (ref 98–107)
CLARITY UR: CLEAR
CO2 SERPL-SCNC: 30 MMOL/L (ref 22–29)
COLOR UR: ABNORMAL
CREAT SERPL-MCNC: 0.84 MG/DL (ref 0.57–1)
DEPRECATED RDW RBC AUTO: 40.4 FL (ref 37–54)
EOSINOPHIL # BLD AUTO: 0.02 10*3/MM3 (ref 0–0.4)
EOSINOPHIL NFR BLD AUTO: 0.2 % (ref 0.3–6.2)
ERYTHROCYTE [DISTWIDTH] IN BLOOD BY AUTOMATED COUNT: 13.2 % (ref 12.3–15.4)
GFR SERPL CREATININE-BSD FRML MDRD: 66 ML/MIN/1.73
GLUCOSE BLDC GLUCOMTR-MCNC: 176 MG/DL (ref 70–130)
GLUCOSE BLDC GLUCOMTR-MCNC: 177 MG/DL (ref 70–130)
GLUCOSE BLDC GLUCOMTR-MCNC: 180 MG/DL (ref 70–130)
GLUCOSE BLDC GLUCOMTR-MCNC: 186 MG/DL (ref 70–130)
GLUCOSE BLDC GLUCOMTR-MCNC: 222 MG/DL (ref 70–130)
GLUCOSE SERPL-MCNC: 232 MG/DL (ref 65–99)
GLUCOSE UR STRIP-MCNC: NEGATIVE MG/DL
HCT VFR BLD AUTO: 37.8 % (ref 34–46.6)
HGB BLD-MCNC: 12.5 G/DL (ref 12–15.9)
HGB UR QL STRIP.AUTO: NEGATIVE
IMM GRANULOCYTES # BLD AUTO: 0.1 10*3/MM3 (ref 0–0.05)
IMM GRANULOCYTES NFR BLD AUTO: 0.8 % (ref 0–0.5)
KETONES UR QL STRIP: ABNORMAL
LEUKOCYTE ESTERASE UR QL STRIP.AUTO: ABNORMAL
LYMPHOCYTES # BLD AUTO: 1.2 10*3/MM3 (ref 0.7–3.1)
LYMPHOCYTES NFR BLD AUTO: 9.3 % (ref 19.6–45.3)
MCH RBC QN AUTO: 27.9 PG (ref 26.6–33)
MCHC RBC AUTO-ENTMCNC: 33.1 G/DL (ref 31.5–35.7)
MCV RBC AUTO: 84.4 FL (ref 79–97)
MONOCYTES # BLD AUTO: 0.83 10*3/MM3 (ref 0.1–0.9)
MONOCYTES NFR BLD AUTO: 6.4 % (ref 5–12)
NEUTROPHILS NFR BLD AUTO: 10.75 10*3/MM3 (ref 1.7–7)
NEUTROPHILS NFR BLD AUTO: 83.1 % (ref 42.7–76)
NITRITE UR QL STRIP: NEGATIVE
NRBC BLD AUTO-RTO: 0 /100 WBC (ref 0–0.2)
PH UR STRIP.AUTO: 6 [PH] (ref 5–8)
PLATELET # BLD AUTO: 398 10*3/MM3 (ref 140–450)
PMV BLD AUTO: 11.1 FL (ref 6–12)
POTASSIUM SERPL-SCNC: 3.2 MMOL/L (ref 3.5–5.2)
PROT UR QL STRIP: ABNORMAL
RBC # BLD AUTO: 4.48 10*6/MM3 (ref 3.77–5.28)
RBC # UR: ABNORMAL /HPF
REF LAB TEST METHOD: ABNORMAL
SODIUM SERPL-SCNC: 136 MMOL/L (ref 136–145)
SP GR UR STRIP: 1.01 (ref 1–1.03)
SQUAMOUS #/AREA URNS HPF: ABNORMAL /HPF
UROBILINOGEN UR QL STRIP: ABNORMAL
WBC # BLD AUTO: 12.92 10*3/MM3 (ref 3.4–10.8)
WBC UR QL AUTO: ABNORMAL /HPF
YEAST URNS QL MICRO: ABNORMAL /HPF

## 2021-03-16 PROCEDURE — 82962 GLUCOSE BLOOD TEST: CPT

## 2021-03-16 PROCEDURE — 25010000002 VANCOMYCIN 10 G RECONSTITUTED SOLUTION: Performed by: INTERNAL MEDICINE

## 2021-03-16 PROCEDURE — 87040 BLOOD CULTURE FOR BACTERIA: CPT | Performed by: INTERNAL MEDICINE

## 2021-03-16 PROCEDURE — 70450 CT HEAD/BRAIN W/O DYE: CPT

## 2021-03-16 PROCEDURE — 81001 URINALYSIS AUTO W/SCOPE: CPT | Performed by: CLINICAL NURSE SPECIALIST

## 2021-03-16 PROCEDURE — 63710000001 INSULIN DETEMIR PER 5 UNITS: Performed by: INTERNAL MEDICINE

## 2021-03-16 PROCEDURE — 99233 SBSQ HOSP IP/OBS HIGH 50: CPT | Performed by: CLINICAL NURSE SPECIALIST

## 2021-03-16 PROCEDURE — 25010000002 CEFTRIAXONE PER 250 MG: Performed by: INTERNAL MEDICINE

## 2021-03-16 PROCEDURE — 25010000002 HYDRALAZINE PER 20 MG: Performed by: INTERNAL MEDICINE

## 2021-03-16 PROCEDURE — 63710000001 INSULIN REGULAR HUMAN PER 5 UNITS: Performed by: INTERNAL MEDICINE

## 2021-03-16 PROCEDURE — 71045 X-RAY EXAM CHEST 1 VIEW: CPT

## 2021-03-16 PROCEDURE — 85025 COMPLETE CBC W/AUTO DIFF WBC: CPT | Performed by: INTERNAL MEDICINE

## 2021-03-16 PROCEDURE — 80048 BASIC METABOLIC PNL TOTAL CA: CPT | Performed by: INTERNAL MEDICINE

## 2021-03-16 PROCEDURE — 87086 URINE CULTURE/COLONY COUNT: CPT | Performed by: CLINICAL NURSE SPECIALIST

## 2021-03-16 RX ORDER — CARVEDILOL 6.25 MG/1
6.25 TABLET ORAL 2 TIMES DAILY WITH MEALS
Status: DISCONTINUED | OUTPATIENT
Start: 2021-03-16 | End: 2021-03-19

## 2021-03-16 RX ADMIN — ATORVASTATIN CALCIUM 40 MG: 40 TABLET, FILM COATED ORAL at 21:45

## 2021-03-16 RX ADMIN — SODIUM CHLORIDE, PRESERVATIVE FREE 10 ML: 5 INJECTION INTRAVENOUS at 21:45

## 2021-03-16 RX ADMIN — ASPIRIN 81 MG: 81 TABLET, CHEWABLE ORAL at 08:04

## 2021-03-16 RX ADMIN — ACETAMINOPHEN 650 MG: 325 TABLET, FILM COATED ORAL at 08:51

## 2021-03-16 RX ADMIN — HUMAN INSULIN 4 UNITS: 100 INJECTION, SOLUTION SUBCUTANEOUS at 00:59

## 2021-03-16 RX ADMIN — METOPROLOL TARTRATE 25 MG: 25 TABLET, FILM COATED ORAL at 08:03

## 2021-03-16 RX ADMIN — INSULIN DETEMIR 30 UNITS: 100 INJECTION, SOLUTION SUBCUTANEOUS at 21:53

## 2021-03-16 RX ADMIN — SODIUM CHLORIDE, PRESERVATIVE FREE 10 ML: 5 INJECTION INTRAVENOUS at 21:58

## 2021-03-16 RX ADMIN — DONEPEZIL HYDROCHLORIDE 5 MG: 5 TABLET, FILM COATED ORAL at 21:45

## 2021-03-16 RX ADMIN — HYDRALAZINE HYDROCHLORIDE 5 MG: 20 INJECTION INTRAMUSCULAR; INTRAVENOUS at 05:21

## 2021-03-16 RX ADMIN — LOSARTAN POTASSIUM 50 MG: 50 TABLET, FILM COATED ORAL at 08:04

## 2021-03-16 RX ADMIN — CARVEDILOL 6.25 MG: 6.25 TABLET, FILM COATED ORAL at 12:41

## 2021-03-16 RX ADMIN — CARVEDILOL 6.25 MG: 6.25 TABLET, FILM COATED ORAL at 17:04

## 2021-03-16 RX ADMIN — VANCOMYCIN HYDROCHLORIDE 1500 MG: 10 INJECTION, POWDER, LYOPHILIZED, FOR SOLUTION INTRAVENOUS at 17:04

## 2021-03-16 RX ADMIN — HUMAN INSULIN 2 UNITS: 100 INJECTION, SOLUTION SUBCUTANEOUS at 06:58

## 2021-03-16 RX ADMIN — SODIUM CHLORIDE, PRESERVATIVE FREE 10 ML: 5 INJECTION INTRAVENOUS at 08:12

## 2021-03-16 RX ADMIN — CEFTRIAXONE SODIUM 1 G: 1 INJECTION, POWDER, FOR SOLUTION INTRAMUSCULAR; INTRAVENOUS at 15:04

## 2021-03-16 RX ADMIN — HUMAN INSULIN 2 UNITS: 100 INJECTION, SOLUTION SUBCUTANEOUS at 12:26

## 2021-03-16 RX ADMIN — TRAZODONE HYDROCHLORIDE 50 MG: 50 TABLET ORAL at 21:45

## 2021-03-16 RX ADMIN — LEVOTHYROXINE SODIUM 75 MCG: 75 TABLET ORAL at 06:55

## 2021-03-16 RX ADMIN — HUMAN INSULIN 2 UNITS: 100 INJECTION, SOLUTION SUBCUTANEOUS at 17:13

## 2021-03-16 RX ADMIN — SODIUM CHLORIDE, PRESERVATIVE FREE 10 ML: 5 INJECTION INTRAVENOUS at 08:09

## 2021-03-16 NOTE — PLAN OF CARE
Goal Outcome Evaluation:  Plan of Care Reviewed With: patient  Progress: no change  Outcome Summary: [facility] orientation due to pt being nonverbal/garbled speech. NIH 22. R side flaccid, but can wiggle toes of RLE. R side visual loss. Responds to pain, arouses to speech/shaking, but varies. Pt has been more lethargic today. Elevated temp this am, tylenol given with good relief. MD/neuro aware of lethargic state/temp. , room air. SCD. Tele - sinus tach. LBM 3/16. IV abx started. Blood glucose monitored. Call light within reach. Safety maintained. Purewick.

## 2021-03-16 NOTE — PLAN OF CARE
Goal Outcome Evaluation:  Plan of Care Reviewed With: patient  Progress: improving  Outcome Summary: VSS, except for sl elevated bp. No change in neuro status this shift, NIH-22. Pt alert, rt side responding to pain. Moving in bed some per self, following some commands. SCD in place. Safety maintained.

## 2021-03-16 NOTE — PROGRESS NOTES
"Pharmacy Dosing Service  Pharmacokinetics  Vancomycin Initial Evaluation  Assessment/Action/Plan:  Current Order: Vancomycin 1500 mg IVPB every 24 hours x 2 days  Current end date:03/17/2021  Levels: N/A  Additional antimicrobial agent(s): Ceftriaxone    Vancomycin dosage initiated based on population pharmacokinetic parameters. Pharmacy will continue to follow daily and adjust dose accordingly.     Subjective:  Val Gonzalez is a 76 y.o. female with a Vancomycin \"Pharmacy to Dose\" consult for the treatment of Febrile illness/unclear source , day 1 of 2 of treatment.    AUC Model Data:  Loading dose: N/A  Regimen: 1500 mg every 24 hours for 2 doses.  Start time: 15:21 on 03/16/2021  Exposure target: AUC24 (range) 400-600 mg/L.hr  AUC24,ss: 584 mg/L.hr  PAUC*: 79 %  Ctrough,ss: 15.7 mg/L  Pconc*: 37 %  Tox.: 11 %    Objective:  Ht: 172.7 cm (67.99\"); Wt: 87.7 kg (193 lb 5.5 oz)  Estimated Creatinine Clearance: 65 mL/min (by C-G formula based on SCr of 0.84 mg/dL).   Creatinine   Date Value Ref Range Status   03/16/2021 0.84 0.57 - 1.00 mg/dL Final   03/15/2021 0.72 0.57 - 1.00 mg/dL Final   03/14/2021 0.71 0.57 - 1.00 mg/dL Final   04/26/2019 0.6 0.5 - 1.1 mg/dL Final   04/25/2019 0.6 0.5 - 1.1 mg/dL Final   04/24/2019 0.9 0.5 - 1.1 mg/dL Final      Lab Results   Component Value Date    WBC 12.92 (H) 03/16/2021    WBC 9.86 03/14/2021    WBC 10.01 03/13/2021      Baseline culture results:  Microbiology Results (last 10 days)       Procedure Component Value - Date/Time    COVID PRE-OP / PRE-PROCEDURE SCREENING ORDER (NO ISOLATION) - Swab, Nasal Cavity [[id number]]  (Normal) Collected: 03/12/21 1638    Lab Status: Final result Specimen: Swab from Nasal Cavity Updated: 03/12/21 8106    Narrative:      The following orders were created for panel order COVID PRE-OP / PRE-PROCEDURE SCREENING ORDER (NO ISOLATION) - Swab, Nasal Cavity.  Procedure                               Abnormality         Status                   "   ---------                               -----------         ------                     COVID-19,[facility] IN-RICHELLE...[[id number]]  Normal              Final result                 Please view results for these tests on the individual orders.    COVID-19,[facility] IN-HOUSE,Nasal Swab No Transport Media 3-4 HR TAT - Swab, Nasal Cavity [[id number]]  (Normal) Collected: 03/12/21 1638    Lab Status: Final result Specimen: Swab from Nasal Cavity Updated: 03/12/21 1806     COVID19 Not Detected    Narrative:      Fact sheet for providers: https://www.fda.gov/media/684209/download     Fact sheet for patients: https://www.fda.gov/media/764484/download    Test performed by PCR.    Consider negative results in combination with clinical observations, patient history, and epidemiological information.    Blood Culture - Blood, Arm, Right [[id number]] Collected: 03/12/21 1434    Lab Status: Preliminary result Specimen: Blood from Arm, Right Updated: 03/15/21 1600     Blood Culture No growth at 3 days    Blood Culture - Blood, Arm, Left [[id number]]  (Abnormal) Collected: 03/12/21 1414    Lab Status: Final result Specimen: Blood from Arm, Left Updated: 03/15/21 0738     Blood Culture Staphylococcus, coagulase negative     Comment: Probable contaminant requires clinical correlation, susceptibility not performed unless requested by physician.          Isolated from Anaerobic Bottle     Gram Stain Anaerobic Bottle Gram positive cocci    Narrative:      Probable contaminant requires clinical correlation, susceptibility not performed unless requested by physician.      Blood Culture ID, PCR - Blood, Arm, Left [[id number]]  (Abnormal) Collected: 03/12/21 1414    Lab Status: Edited Result - FINAL Specimen: Blood from Arm, Left Updated: 03/15/21 0732     BCID, PCR Staphylococcus spp, not aureus. Identification by BCID PCR.     Comment: Corrected result. Previous result was Staphylococcus aureus. Identification by BCID PCR. on 3/13/2021 at 1227  CST.               Clarissa Ortega, PharmD  03/16/21 14:24 CDT

## 2021-03-16 NOTE — PLAN OF CARE
Goal Outcome Evaluation:  Plan of Care Reviewed With: patient  Progress: no change  Outcome Summary: Ntn follow up. Pureed/[facility]/Cardiac diet, oral intake 25% of two meals recorded over the past three days. Boost Glucose Control BID. Pt may benefit from enteral ntn if oral intake does not improve if clinically appropriate. Cont to follow for plan of care.

## 2021-03-16 NOTE — PROGRESS NOTES
1           [facility] Medicine Services  INPATIENT PROGRESS NOTE    Patient Name: Val Gonzalez  Date of Admission: 3/12/2021  Today's Date: 03/16/21  Length of Stay: 4  Primary Care Physician: Provider, No Known    Subjective   Chief Complaint: Follow-up  HPI     Patient was admitted for acute CVA predominantly in the left MCA distribution.  She has known history of chronic old infarct of the occipital lobe.   Nurse approached me earlier at the hallway as patient had fever treated with acetaminophen.  Neurology took the liberty to order for chest x-ray and urinalysis.    Patient had fever also on March 13 with T-max of 101.5.  Her blood culture showed 1 bottle that showed coagulase-negative Staphylococcus out of 2 sets.  I stopped vancomycin yesterday as I felt this is a contaminant.  I plan to monitor her off antibiotic.  I am told that she has been less responsive this morning.  I did not find any medications that would place her drowsy       Review of Systems   Patient unable to participate in her care due to stroke    Objective    Temp:  [98.2 °F (36.8 °C)-101 °F (38.3 °C)] 100 °F (37.8 °C)  Heart Rate:  [73-99] 89  Resp:  [18-20] 20  BP: (143-184)/(71-96) 169/86  Physical Exam  She is actually awake and alert.  She is not able to follow commands.  She is nonverbal and noninteractive to me  She is calm compared yesterday.  Normocephalic and atraumatic head  She smiled at me.  She has right facial droop.  She has weakness right side.  She withdraws to pain on right upper extremity and showed some spontaneous movement of right lower extremity  Diminished breath sounds without any adventitious sounds  S1-S2, regular rate and rhythm without crackles.  Telemetry showed sinus rhythm  Soft abdomen nontender, no hypogastric fullness  No gross edema  SCD in place    Results Review:  I have reviewed the labs, radiology results, and diagnostic studies.    Laboratory Data:   Results from  last 7 days   Lab Units 03/14/21  0132 03/13/21  0205 03/12/21  1410   WBC 10*3/mm3 9.86 10.01 11.73*   HEMOGLOBIN g/dL 12.2 11.5* 12.9   HEMATOCRIT % 38.5 35.5 40.6   PLATELETS 10*3/mm3 382 311 349        Results from last 7 days   Lab Units 03/15/21  0524 03/14/21  0132 03/13/21  0205 03/12/21  1410   SODIUM mmol/L 137 138 136 137   POTASSIUM mmol/L 3.4* 3.8 4.5 5.2   CHLORIDE mmol/L 99 102 100 100   CO2 mmol/L 25.0 27.0 26.0 28.0   BUN mg/dL 13 16 26* 29*   CREATININE mg/dL 0.72 0.71 0.91 1.06*   CALCIUM mg/dL 8.9 8.9 8.6 9.2   BILIRUBIN mg/dL  --   --  0.4 0.4   ALK PHOS U/L  --   --  92 107   ALT (SGPT) U/L  --   --  33 39*   AST (SGOT) U/L  --   --  22 33*   GLUCOSE mg/dL 247* 186* 341* 335*       Culture Data:   Blood Culture   Date Value Ref Range Status   03/12/2021 No growth at 3 days  Preliminary   03/12/2021 Staphylococcus, coagulase negative (C)  Final     Comment:     Probable contaminant requires clinical correlation, susceptibility not performed unless requested by physician.         Radiology Data:   Imaging Results (Last 24 Hours)     ** No results found for the last 24 hours. **          I have reviewed the patient's current medications.     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  Unclear whether there is a neurologic component of the febrile illness.  He had a recurrence of fever this morning that required acetaminophen.  UA, chest x-ray were requested and blood culture will be added  · Hypertension -blood pressure ranged from blood pressure 169/86 Patient is on losartan and metoprolol.  Monitor blood pressure.  Will adjust medication  accordingly.    · Hyperlipidemia -LDL 73; continue statin  · Diabetes mellitus type -blood sugar noted at 222, 177.  Patient is on Levemir and sliding scale.  Her A1c level is 10.6.    Levemir increased yesterday we will keep an eye on blood sugar today and see if further adjustment is needed. As needed D50 for hypoglycemia  · altered mental status  · Coag negative Staphylococcus bacteremia felt to be contaminant.  · Mild hypokalemia -check BMP         Appreciate input from neurology.  Supportive care for now  Continue physical rehabilitation/speech  Noted has been updated to purée and thin liquids.  Other plan of care as outlined in the problem list.  Discussed with nurse Vinson    Discharge Planning: tbd based on finding regarding fever.      Electronically signed by Abdullahi Han MD, 03/16/21, 10:12 CDT.

## 2021-03-17 LAB
BACTERIA SPEC AEROBE CULT: NO GROWTH
BACTERIA SPEC AEROBE CULT: NORMAL
GLUCOSE BLDC GLUCOMTR-MCNC: 200 MG/DL (ref 70–130)
GLUCOSE BLDC GLUCOMTR-MCNC: 238 MG/DL (ref 70–130)
GLUCOSE BLDC GLUCOMTR-MCNC: 267 MG/DL (ref 70–130)
GLUCOSE BLDC GLUCOMTR-MCNC: 274 MG/DL (ref 70–130)
GLUCOSE BLDC GLUCOMTR-MCNC: 283 MG/DL (ref 70–130)

## 2021-03-17 PROCEDURE — 63710000001 INSULIN REGULAR HUMAN PER 5 UNITS: Performed by: INTERNAL MEDICINE

## 2021-03-17 PROCEDURE — 82962 GLUCOSE BLOOD TEST: CPT

## 2021-03-17 PROCEDURE — 63710000001 INSULIN DETEMIR PER 5 UNITS: Performed by: INTERNAL MEDICINE

## 2021-03-17 PROCEDURE — 25010000002 CEFTRIAXONE PER 250 MG: Performed by: INTERNAL MEDICINE

## 2021-03-17 PROCEDURE — 99232 SBSQ HOSP IP/OBS MODERATE 35: CPT | Performed by: CLINICAL NURSE SPECIALIST

## 2021-03-17 PROCEDURE — 25010000002 VANCOMYCIN 10 G RECONSTITUTED SOLUTION: Performed by: INTERNAL MEDICINE

## 2021-03-17 PROCEDURE — 92526 ORAL FUNCTION THERAPY: CPT | Performed by: SPEECH-LANGUAGE PATHOLOGIST

## 2021-03-17 RX ORDER — POTASSIUM CHLORIDE 750 MG/1
40 CAPSULE, EXTENDED RELEASE ORAL ONCE
Status: COMPLETED | OUTPATIENT
Start: 2021-03-17 | End: 2021-03-17

## 2021-03-17 RX ADMIN — LEVOTHYROXINE SODIUM 75 MCG: 75 TABLET ORAL at 05:50

## 2021-03-17 RX ADMIN — SODIUM CHLORIDE, PRESERVATIVE FREE 10 ML: 5 INJECTION INTRAVENOUS at 20:43

## 2021-03-17 RX ADMIN — HUMAN INSULIN 4 UNITS: 100 INJECTION, SOLUTION SUBCUTANEOUS at 23:28

## 2021-03-17 RX ADMIN — CARVEDILOL 6.25 MG: 6.25 TABLET, FILM COATED ORAL at 19:59

## 2021-03-17 RX ADMIN — ASPIRIN 81 MG: 81 TABLET, CHEWABLE ORAL at 09:04

## 2021-03-17 RX ADMIN — VANCOMYCIN HYDROCHLORIDE 1500 MG: 10 INJECTION, POWDER, LYOPHILIZED, FOR SOLUTION INTRAVENOUS at 19:59

## 2021-03-17 RX ADMIN — INSULIN DETEMIR 30 UNITS: 100 INJECTION, SOLUTION SUBCUTANEOUS at 20:44

## 2021-03-17 RX ADMIN — HUMAN INSULIN 4 UNITS: 100 INJECTION, SOLUTION SUBCUTANEOUS at 05:50

## 2021-03-17 RX ADMIN — ATORVASTATIN CALCIUM 40 MG: 40 TABLET, FILM COATED ORAL at 20:42

## 2021-03-17 RX ADMIN — HUMAN INSULIN 4 UNITS: 100 INJECTION, SOLUTION SUBCUTANEOUS at 00:49

## 2021-03-17 RX ADMIN — LOSARTAN POTASSIUM 50 MG: 50 TABLET, FILM COATED ORAL at 09:04

## 2021-03-17 RX ADMIN — CARVEDILOL 6.25 MG: 6.25 TABLET, FILM COATED ORAL at 09:04

## 2021-03-17 RX ADMIN — POTASSIUM CHLORIDE 40 MEQ: 750 CAPSULE, EXTENDED RELEASE ORAL at 12:58

## 2021-03-17 RX ADMIN — HUMAN INSULIN 6 UNITS: 100 INJECTION, SOLUTION SUBCUTANEOUS at 12:55

## 2021-03-17 RX ADMIN — HUMAN INSULIN 6 UNITS: 100 INJECTION, SOLUTION SUBCUTANEOUS at 19:59

## 2021-03-17 RX ADMIN — DONEPEZIL HYDROCHLORIDE 5 MG: 5 TABLET, FILM COATED ORAL at 20:43

## 2021-03-17 RX ADMIN — SODIUM CHLORIDE, PRESERVATIVE FREE 10 ML: 5 INJECTION INTRAVENOUS at 09:04

## 2021-03-17 RX ADMIN — CEFTRIAXONE SODIUM 1 G: 1 INJECTION, POWDER, FOR SOLUTION INTRAMUSCULAR; INTRAVENOUS at 16:52

## 2021-03-17 RX ADMIN — TRAZODONE HYDROCHLORIDE 50 MG: 50 TABLET ORAL at 20:43

## 2021-03-17 NOTE — PLAN OF CARE
Goal Outcome Evaluation:  Plan of Care Reviewed With: patient  Progress: no change  Outcome Summary: VSS. No change in neuro status this shift, NIH- 23/25. Pt is not as alert this shift, as previous, did have a snack at 2200. Follows some commands, attempts to talk at times, unable to understand. SCD in place. Safety maintained.

## 2021-03-17 NOTE — PLAN OF CARE
Goal Outcome Evaluation:  Plan of Care Reviewed With: patient, other (see comments) (MIRANDA Ortiz)  Progress: no change       Swallow treatment completed. The patient was asleep upon entering. Wet coughing observed due to intolerance of mild secretions while sleeping. Patient more lethargic today than previous session with SLP. Patient completed trials of thin liquids with inconsistent coughing observed. No s/s given nectar thick. Patient too lethargic to complete swallowing exercises at this time. SLP recommends downgrade to nectar thick. RN aware. SLP will continue to follow.     Bria Mcpherson MS CCC-SLP 3/17/2021 09:52 CDT

## 2021-03-17 NOTE — PROGRESS NOTES
[facility] Medicine Services  INPATIENT PROGRESS NOTE    Patient Name: Val Gonzalez  Date of Admission: 3/12/2021  Today's Date: 03/17/21  Length of Stay: 5  Primary Care Physician: Provider, No Known    Subjective   Chief Complaint: Follow-up  HPI     I have started her empirically on antibiotics due to fever and elevated white count.  I do not have a clear source of infection at this time.  She has a wound on her bottom which I intend to inspect today however she is currently having lunch.  I will revisit her later with nurse Diana.  She is a feeder.      Review of Systems   Patient unable to participate in her care due to stroke.  She is aphasic.  She attempts to open her mouth but no words comes out    Objective    Temp:  [97.4 °F (36.3 °C)-98.7 °F (37.1 °C)] 97.4 °F (36.3 °C)  Heart Rate:  [55-73] 66  Resp:  [16-20] 16  BP: (112-135)/(51-80) 112/54  Physical Exam  She is actually awake and alert.  She is not able to follow commands.  She is nonverbal and noninteractive to me.  She appears to be chewing well her food  She is calm compared yesterday.  Normocephalic and atraumatic head  Diminished breath sounds without any adventitious sounds  S1-S2, regular rate and rhythm without crackles.  Telemetry showed sinus rhythm  Soft abdomen nontender, no hypogastric fullness  No gross edema  SCD in place      Results Review:  I have reviewed the labs, radiology results, and diagnostic studies.    Laboratory Data:   Results from last 7 days   Lab Units 03/16/21  1230 03/14/21  0132 03/13/21  0205   WBC 10*3/mm3 12.92* 9.86 10.01   HEMOGLOBIN g/dL 12.5 12.2 11.5*   HEMATOCRIT % 37.8 38.5 35.5   PLATELETS 10*3/mm3 398 382 311        Results from last 7 days   Lab Units 03/16/21  1230 03/15/21  0524 03/14/21  0132 03/13/21  0205 03/12/21  1410   SODIUM mmol/L 136 137 138 136 137   POTASSIUM mmol/L 3.2* 3.4* 3.8 4.5 5.2   CHLORIDE mmol/L 98 99 102 100 100   CO2 mmol/L 30.0* 25.0 27.0  26.0 28.0   BUN mg/dL 12 13 16 26* 29*   CREATININE mg/dL 0.84 0.72 0.71 0.91 1.06*   CALCIUM mg/dL 8.8 8.9 8.9 8.6 9.2   BILIRUBIN mg/dL  --   --   --  0.4 0.4   ALK PHOS U/L  --   --   --  92 107   ALT (SGPT) U/L  --   --   --  33 39*   AST (SGOT) U/L  --   --   --  22 33*   GLUCOSE mg/dL 232* 247* 186* 341* 335*       Culture Data:   Blood Culture   Date Value Ref Range Status   03/12/2021 No growth at 4 days  Preliminary   03/12/2021 Staphylococcus, coagulase negative (C)  Final     Comment:     Probable contaminant requires clinical correlation, susceptibility not performed unless requested by physician.       Urine Culture   Date Value Ref Range Status   03/16/2021 No growth  Preliminary       Radiology Data:   Imaging Results (Last 24 Hours)     ** No results found for the last 24 hours. **          I have reviewed the patient's current medications.     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  Unclear whether there is a neurologic component of the febrile illness.  He had a recurrence of fever this morning that required acetaminophen.  UA, chest x-ray were requested and blood culture will be added  · Hypertension -blood pressure ranged from blood pressure 169/86 Patient is on losartan and metoprolol.  Monitor blood pressure.  Will adjust medication accordingly.    · Hyperlipidemia -LDL 73; continue statin  · Diabetes mellitus type -blood sugar noted at 222, 177.  Patient is on Levemir and sliding scale.  Her A1c level is 10.6.    Levemir increased yesterday we will keep an eye on blood sugar today and see if further adjustment is needed. As needed D50 for  hypoglycemia  · altered mental status  · Coag negative Staphylococcus bacteremia felt to be contaminant.  · Mild hypokalemia -check BMP        Follow-up hest x-ray yesterday and evaluation of fever did not show acute cardiopulmonary process  White count was 12,000 with predominance of neutrophils.  Urine culture showed no growth to date.  Blood culture pending  I only ordered couple of days of antibiotics.  We will revisit and determine if we will need continued dosing.  She has not received any acetaminophen today or overnight.  She has not had any febrile illness after 9:30 AM yesterday.  Blood pressure is improved since changing metoprolol to carvedilol in addition to losartan.  We will give potassium and follow its level     Discharge Planning: To be determined  Electronically signed by Abdullahi Han MD, 03/17/21, 12:27 CDT.

## 2021-03-17 NOTE — THERAPY TREATMENT NOTE
Acute Care - Speech Language Pathology   Swallow Treatment Note [facility]     Patient Name: Val Gonzalez  : 1945  MRN: [id number]  Today's Date: 3/17/2021               Admit Date: 3/12/2021  Swallow treatment completed. The patient was asleep upon entering. Wet coughing observed due to intolerance of mild secretions while sleeping. Patient more lethargic today than previous session with SLP. Patient completed trials of thin liquids with inconsistent coughing observed. No s/s given nectar thick. Patient too lethargic to complete swallowing exercises at this time. SLP recommends downgrade to nectar thick. RN aware. SLP will continue to follow.   Bria Mcpherson MS CCC-SLP 3/17/2021 09:56 CDT    Visit Dx:     ICD-10-CM ICD-9-CM   1. Cerebrovascular accident (CVA), unspecified mechanism (CMS/HCC)  I63.9 434.91   2. Oropharyngeal dysphagia  R13.12 787.22   3. Impaired mobility and ADLs  Z74.09 V49.89    Z78.9      Patient Active Problem List   Diagnosis   • Cerebrovascular accident (CVA) (CMS/HCC)   • Acute CVA (cerebrovascular accident) (CMS/HCC)   • Benign essential HTN   • Hyperlipidemia   • DM2 (diabetes mellitus, type 2) (CMS/HCC)   • Altered mental status     Past Medical History:   Diagnosis Date   • Altered mental status    • Alzheimer's disease (CMS/HCC)    • Arthritis    • Cerebral infarction (CMS/HCC)    • Dementia in other diseases classified elsewhere without behavioral disturbance (CMS/HCC)    • Elevated cholesterol    • Gastro-esophageal reflux disease without esophagitis    • Hemiplegia and hemiparesis following cerebral infarction affecting right dominant side (CMS/HCC)    • Hyperkalemia    • Hyperlipidemia    • Hypertension    • Long term (current) use of insulin (CMS/HCC)    • Other recurrent depressive disorders (CMS/HCC)    • Other specified hypothyroidism    • Pain, unspecified    • Stroke (CMS/HCC)    • Type 2 diabetes mellitus with hyperglycemia (CMS/HCC)      Past Surgical History:    Procedure Laterality Date   • BACK SURGERY          SWALLOW EVALUATION (last 72 hours)      SLP Adult Swallow Evaluation     Row Name 03/17/21 0934 03/15/21 1229                Rehab Evaluation    Document Type  therapy note (daily note)  -MM  therapy note (daily note)  -MM       Subjective Information  no complaints  -MM  no complaints  -MM       Patient Observations  lethargic;cooperative;agree to therapy  -MM  alert;cooperative;agree to therapy  -MM       Patient/Family/Caregiver Comments/Observations  No family present.  -MM  Son present and then left room  -MM       Care Plan Review  care plan/treatment goals reviewed  -MM  care plan/treatment goals reviewed  -MM       Care Plan Review, Other Participant(s)  other (see comments) MIRANDA Ortiz   -MM  family;other (see comments) MIRANDA Vinson   -MM       Patient Effort  adequate  -MM  adequate  -MM          Pain    Additional Documentation  Pain Scale: FACES Pre/Post-Treatment (Group)  -MM  Pain Scale: FACES Pre/Post-Treatment (Group)  -MM          Pain Scale: FACES Pre/Post-Treatment    Pain: FACES Scale, Pretreatment  0-->no hurt  -MM  0-->no hurt  -MM       Posttreatment Pain Rating  0-->no hurt  -MM  0-->no hurt  -MM          Clinical Impression    Daily Summary of Progress (SLP)  progress toward functional goals is gradual  -MM  progress toward functional goals as expected  -MM       Barriers to Overall Progress (SLP)  Previous deficits   -MM  previous deficits  -MM       Plan for Continued Treatment (SLP)  Downgrade to nectar   -MM  Upgrade to thin  -MM          Swallow Goals (SLP)    Oral Nutrition/Hydration Goal Selection (SLP)  oral nutrition/hydration, SLP goal 1  -MM  oral nutrition/hydration, SLP goal 1  -MM       Labial Strengthening Goal Selection (SLP)  labial strengthening, SLP goal 1  -MM  labial strengthening, SLP goal 1  -MM       Lingual Strengthening Goal Selection (SLP)  lingual strengthening, SLP goal 1  -MM  lingual strengthening, SLP goal 1   -MM       Pharyngeal Strengthening Exercise Goal Selection (SLP)  pharyngeal strengthening exercise, SLP goal 1  -MM  pharyngeal strengthening exercise, SLP goal 1  -MM       Additional Documentation  labial strengthening goal selection (SLP);lingual strengthening goal selection (SLP);pharyngeal strengthening exercise goal selection (SLP)  -MM  labial strengthening goal selection (SLP);lingual strengthening goal selection (SLP);pharyngeal strengthening exercise goal selection (SLP)  -MM          Oral Nutrition/Hydration Goal 1 (SLP)    Oral Nutrition/Hydration Goal 1, SLP  LTG: Patient will tolerate LRD with no overt s/s of aspiration  -MM  LTG: Patient will tolerate LRD with no overt s/s of aspiration  -MM       Time Frame (Oral Nutrition/Hydration Goal 1, SLP)  short term goal (STG);by discharge  -MM  short term goal (STG);by discharge  -MM       Barriers (Oral Nutrition/Hydration Goal 1, SLP)  pre-existing deficits  -MM  pre-existing deficits  -MM       Progress/Outcomes (Oral Nutrition/Hydration Goal 1, SLP)  continuing progress toward goal  -MM  continuing progress toward goal  -MM          Labial Strengthening Goal 1 (SLP)    Activity (Labial Strengthening Goal 1, SLP)  increase labial tone  -MM  increase labial tone  -MM       Increase Labial Tone  labial resistance exercises  -MM  labial resistance exercises  -MM       [place]/Accuracy (Labial Strengthening Goal 1, SLP)  independently (over 90% accuracy)  -MM  independently (over 90% accuracy)  -MM       Time Frame (Labial Strengthening Goal 1, SLP)  short term goal (STG);by discharge  -MM  short term goal (STG);by discharge  -MM       Barriers (Labial Strengthening Goal 1, SLP)  pre-existing deficits  -MM  pre-existing deficits  -MM       Progress/Outcomes (Labial Strengthening Goal 1, SLP)  progress slower than expected  -MM  goal ongoing  -MM          Lingual Strengthening Goal 1 (SLP)    Activity (Lingual Strengthening Goal 1, SLP)  increase lingual  tone/sensation/control/coordination/movement  -MM  increase lingual tone/sensation/control/coordination/movement  -MM       Increase Lingual Tone/Sensation/Control/Coordination/Movement  lingual movement exercises  -MM  lingual movement exercises  -MM       [place]/Accuracy (Lingual Strengthening Goal 1, SLP)  independently (over 90% accuracy)  -MM  independently (over 90% accuracy)  -MM       Time Frame (Lingual Strengthening Goal 1, SLP)  short term goal (STG);by discharge  -MM  short term goal (STG);by discharge  -MM       Barriers (Lingual Strengthening Goal 1, SLP)  pre-existing deficits  -MM  pre-existing deficits  -MM       Progress/Outcomes (Lingual Strengthening Goal 1, SLP)  progress slower than expected  -MM  goal ongoing  -MM          Pharyngeal Strengthening Exercise Goal 1 (SLP)    Activity (Pharyngeal Strengthening Goal 1, SLP)  increase timing  -MM  increase timing  -MM       Increase Timing  gustatory stimulation (sour/cold)  -MM  gustatory stimulation (sour/cold)  -MM       [place]/Accuracy (Pharyngeal Strengthening Goal 1, SLP)  independently (over 90% accuracy)  -MM  independently (over 90% accuracy)  -MM       Time Frame (Pharyngeal Strengthening Goal 1, SLP)  short term goal (STG);by discharge  -MM  short term goal (STG);by discharge  -MM       Barriers (Pharyngeal Strengthening Goal 1, SLP)  pre-existing deficits  -MM  pre-existing deficits  -MM       Progress/Outcomes (Pharyngeal Strengthening Goal 1, SLP)  progress slower than expected  -MM  goal ongoing  -MM         User Key  (r) = Recorded By, (t) = Taken By, (c) = Cosigned By    Initials Name Effective Dates    Bria Wong, MS CCC-SLP 07/12/20 -           EDUCATION  The patient has been educated in the following areas:   Dysphagia (Swallowing Impairment) Oral Care/Hydration Modified Diet Instruction.    SLP Recommendation and Plan                                         Daily Summary of Progress (SLP): progress toward  functional goals is gradual    Plan for Continued Treatment (SLP): Downgrade to nectar               Plan of Care Reviewed With: patient, other (see comments) (MIRANDA Ortiz)  Progress: no change    SLP GOALS     Row Name 03/17/21 0934 03/15/21 1229          Oral Nutrition/Hydration Goal 1 (SLP)    Oral Nutrition/Hydration Goal 1, SLP  LTG: Patient will tolerate LRD with no overt s/s of aspiration  -MM  LTG: Patient will tolerate LRD with no overt s/s of aspiration  -MM     Time Frame (Oral Nutrition/Hydration Goal 1, SLP)  short term goal (STG);by discharge  -MM  short term goal (STG);by discharge  -MM     Barriers (Oral Nutrition/Hydration Goal 1, SLP)  pre-existing deficits  -MM  pre-existing deficits  -MM     Progress/Outcomes (Oral Nutrition/Hydration Goal 1, SLP)  continuing progress toward goal  -MM  continuing progress toward goal  -MM        Labial Strengthening Goal 1 (SLP)    Activity (Labial Strengthening Goal 1, SLP)  increase labial tone  -MM  increase labial tone  -MM     Increase Labial Tone  labial resistance exercises  -MM  labial resistance exercises  -MM     [place]/Accuracy (Labial Strengthening Goal 1, SLP)  independently (over 90% accuracy)  -MM  independently (over 90% accuracy)  -MM     Time Frame (Labial Strengthening Goal 1, SLP)  short term goal (STG);by discharge  -MM  short term goal (STG);by discharge  -MM     Barriers (Labial Strengthening Goal 1, SLP)  pre-existing deficits  -MM  pre-existing deficits  -MM     Progress/Outcomes (Labial Strengthening Goal 1, SLP)  progress slower than expected  -MM  goal ongoing  -MM        Lingual Strengthening Goal 1 (SLP)    Activity (Lingual Strengthening Goal 1, SLP)  increase lingual tone/sensation/control/coordination/movement  -MM  increase lingual tone/sensation/control/coordination/movement  -MM     Increase Lingual Tone/Sensation/Control/Coordination/Movement  lingual movement exercises  -MM  lingual movement exercises  -MM      [place]/Accuracy (Lingual Strengthening Goal 1, SLP)  independently (over 90% accuracy)  -MM  independently (over 90% accuracy)  -MM     Time Frame (Lingual Strengthening Goal 1, SLP)  short term goal (STG);by discharge  -MM  short term goal (STG);by discharge  -MM     Barriers (Lingual Strengthening Goal 1, SLP)  pre-existing deficits  -MM  pre-existing deficits  -MM     Progress/Outcomes (Lingual Strengthening Goal 1, SLP)  progress slower than expected  -MM  goal ongoing  -MM        Pharyngeal Strengthening Exercise Goal 1 (SLP)    Activity (Pharyngeal Strengthening Goal 1, SLP)  increase timing  -MM  increase timing  -MM     Increase Timing  gustatory stimulation (sour/cold)  -MM  gustatory stimulation (sour/cold)  -MM     [place]/Accuracy (Pharyngeal Strengthening Goal 1, SLP)  independently (over 90% accuracy)  -MM  independently (over 90% accuracy)  -MM     Time Frame (Pharyngeal Strengthening Goal 1, SLP)  short term goal (STG);by discharge  -MM  short term goal (STG);by discharge  -MM     Barriers (Pharyngeal Strengthening Goal 1, SLP)  pre-existing deficits  -MM  pre-existing deficits  -MM     Progress/Outcomes (Pharyngeal Strengthening Goal 1, SLP)  progress slower than expected  -MM  goal ongoing  -MM       User Key  (r) = Recorded By, (t) = Taken By, (c) = Cosigned By    Initials Name Provider Type    Bria Wong MS CCC-SLP Speech and Language Pathologist             Time Calculation:   Time Calculation- SLP     Row Name 03/17/21 0955             Time Calculation- SLP    SLP Start Time  0934  -MM      SLP Stop Time  0944  -MM      SLP Time Calculation (min)  10 min  -MM      SLP Received On  03/17/21  -MM        User Key  (r) = Recorded By, (t) = Taken By, (c) = Cosigned By    Initials Name Provider Type    Bria Wong MS CCC-SLP Speech and Language Pathologist          Therapy Charges for Today     Code Description Service Date Service Provider Modifiers Qty     [id number] [facility] TREATMENT SWALLOW 1 3/17/2021 Bria Mcpherson, MS CCC-SLP GN 1               Bria Mcpherson MS CCC-SLP  3/17/2021

## 2021-03-17 NOTE — PROGRESS NOTES
Neurology Progress Note      Chief Complaint:  F/u stroke    Subjective     Subjective:  Lying in bed. No family at bedside. More alert today. Remains aphasic and does not make any attempts to make sounds. Afebrile since yesterday AM. Rocephin resumed yesterday by attending. CXR stable, CT head showed stable left MCA stroke without hemorraghic conversion. UA trace bacteria and 6-12 WBC. Still has wet sounding cough at times.  Patient did take some drinks of thickened liquids for me.       Medications:  Current Facility-Administered Medications   Medication Dose Route Frequency Provider Last Rate Last Admin   • acetaminophen (TYLENOL) tablet 650 mg  650 mg Oral Q4H PRN Jose Antonio Hi DO   650 mg at 03/16/21 0851    Or   • acetaminophen (TYLENOL) suppository 650 mg  650 mg Rectal Q4H PRN Jose Antonio Hi DO   650 mg at 03/13/21 0337   • aspirin chewable tablet 81 mg  81 mg Oral Daily Jose Antonio Hi DO   81 mg at 03/17/21 0904    Or   • aspirin suppository 300 mg  300 mg Rectal Daily Jose Antonio Hi DO   300 mg at 03/14/21 1023   • atorvastatin (LIPITOR) tablet 40 mg  40 mg Oral Nightly Kaci Bullock APRN   40 mg at 03/16/21 2145   • carvedilol (COREG) tablet 6.25 mg  6.25 mg Oral BID With Meals Abdullahi Han MD   6.25 mg at 03/17/21 0904   • cefTRIAXone (ROCEPHIN) 1 g/100 mL 0.9% NS (MBP)  1 g Intravenous Q24H Abdullahi Han MD   1 g at 03/16/21 1504   • dextrose (D50W) 25 g/ 50mL Intravenous Solution 25 g  25 g Intravenous Q15 Min PRN Jose Antonio Hi DO       • dextrose (GLUTOSE) oral gel 15 g  15 g Oral Q15 Min PRN Jose Antonio Hi DO       • donepezil (ARICEPT) tablet 5 mg  5 mg Oral Nightly Jose Antonio Hi DO   5 mg at 03/16/21 2145   • enalaprilat (VASOTEC) injection 0.625 mg  0.625 mg Intravenous Q6H PRN Jose Antonio Hi DO   0.625 mg at 03/14/21 0510   • glucagon (human recombinant) (GLUCAGEN DIAGNOSTIC) injection 1 mg  1 mg Subcutaneous Q15 Min PRN Jose Antonio Hi,  DO       • hydrALAZINE (APRESOLINE) injection 5 mg  5 mg Intravenous Q6H PRN Jose Antonio Hi DO   5 mg at 03/16/21 0521   • insulin detemir (LEVEMIR) injection 30 Units  30 Units Subcutaneous Nightly Abdullahi Han MD   30 Units at 03/16/21 2153   • insulin regular (humuLIN R,novoLIN R) injection 0-9 Units  0-9 Units Subcutaneous Q6H Jose Antonio Hi DO   4 Units at 03/17/21 0550   • labetalol (NORMODYNE,TRANDATE) injection 20 mg  20 mg Intravenous Q4H PRN Jose Antonio Hi DO   20 mg at 03/12/21 1911   • levothyroxine (SYNTHROID, LEVOTHROID) tablet 75 mcg  75 mcg Oral Q AM Jose Antonio Hi DO   75 mcg at 03/17/21 0550   • losartan (COZAAR) tablet 50 mg  50 mg Oral Daily Jose Antonio Hi DO   50 mg at 03/17/21 0904   • ondansetron (ZOFRAN) injection 4 mg  4 mg Intravenous Q6H PRN Jose Antonio Hi, DO       • sodium chloride 0.9 % flush 10 mL  10 mL Intravenous PRN Jose Antonio Hi DO       • sodium chloride 0.9 % flush 10 mL  10 mL Intravenous Q12H Jsoe Antonio Hi DO   10 mL at 03/16/21 2158   • sodium chloride 0.9 % flush 10 mL  10 mL Intravenous PRN Jose Antonio Hi DO       • sodium chloride 0.9 % flush 10 mL  10 mL Intravenous Q12H Jose Antonio Hi DO   10 mL at 03/17/21 0904   • sodium chloride 0.9 % flush 10 mL  10 mL Intravenous PRN Jos eAntonio Hi DO       • traZODone (DESYREL) tablet 50 mg  50 mg Oral Nightly Jose Antonio Hi DO   50 mg at 03/16/21 2145   • vancomycin 1500 mg/500 mL 0.9% NS IVPB ([facility])  1,500 mg Intravenous Q24H Abdullahi Han MD   1,500 mg at 03/16/21 1704       Review of Systems:   -A 14 point review of systems is not able to be completed secondary to aphasia.         Objective      Vital Signs  Temp:  [97.8 °F (36.6 °C)-98.8 °F (37.1 °C)] 97.8 °F (36.6 °C)  Heart Rate:  [55-80] 55  Resp:  [18-20] 18  BP: (112-135)/(51-80) 135/69      Telemetry: S 70s        Physical Exam:  General Exam:  Head:  Normocephalic, atraumatic  HEENT:  Neck supple  Fundoscopic  Exam:  No signs of disc edema  CVS:  Regular rate and rhythm.  No murmurs  Carotid Examination:  No bruits  Lungs:  Clear to auscultation  Abdomen:  Nontender, nondistended  Extremities:  No signs of peripheral edema  Skin:  No rashes     Neurologic Exam:     Mental Status:    -alert. Opens eyes to voice  -Follows some simple commands      CN II:  Visual fields with blink to threat bilateral .  Pupils equally reactive to light  CN III, IV, VI:  EOMI appear intact but does have left gaze preference. She did turn her head to right when examiner entered the room on her right side and called out her name  CN V:  Facial sensory is symmetric with no asymmetries.  CN VII:  Facial motor asymmetric with right lower facial weakness  CN VIII:  Gross hearing intact bilaterally  CN IX:  Palate elevates symmetrically  CN X:  Palate elevates symmetrically  CN XI:  Shoulder shrug asymmetric and absent on right     CN XII:  Tongue protrudes to midline  Motor: (strength out of 5:  1= minimal movement, 2 = movement in plane of gravity, 3 = movement against gravity, 4 = movement against some resistance, 5 = full strength)     -Right Upper Ext: Proximal: 0 Distal: 0  -Left Upper Ext: Proximal: 5   Distal: 5     -Right Lower Ext: Proximal: 0 Distal: 0  - patient had purposeful movement to left upper extremity but would not lift left leg to command. When asked to plantar flex she dorsiflexed left foot.     Right upper and lower extremity flaccid.        DTR:  -Right              Bicep: 2+         Tricep: 2+        Brachioradialis: 2+              Patella: 2+       Ankle: 2+          Babinski  -Left              Bicep: 2+         Tricep: 2+        Brachioradialis: 2+              Patella: 2+       Ankle: 2+         Neg Babinski     Sensory:  -Intact to light touch,      Coordination:  -no gross ataxia noted on left.   Not able to perform testing on right secondary to flaccidity.      Gait  -not tested for safety reasons        Results  Review:    I reviewed the patient's new clinical results.    Results from last 7 days   Lab Units 03/16/21  1230 03/14/21  0132 03/13/21  0205   WBC 10*3/mm3 12.92* 9.86 10.01   HEMOGLOBIN g/dL 12.5 12.2 11.5*   HEMATOCRIT % 37.8 38.5 35.5   PLATELETS 10*3/mm3 398 382 311        Results from last 7 days   Lab Units 03/16/21  1230 03/15/21  0524 03/14/21  0132 03/13/21  0205 03/12/21  1410   SODIUM mmol/L 136 137 138 136 137   POTASSIUM mmol/L 3.2* 3.4* 3.8 4.5 5.2   CHLORIDE mmol/L 98 99 102 100 100   CO2 mmol/L 30.0* 25.0 27.0 26.0 28.0   BUN mg/dL 12 13 16 26* 29*   CREATININE mg/dL 0.84 0.72 0.71 0.91 1.06*   CALCIUM mg/dL 8.8 8.9 8.9 8.6 9.2   BILIRUBIN mg/dL  --   --   --  0.4 0.4   ALK PHOS U/L  --   --   --  92 107   ALT (SGPT) U/L  --   --   --  33 39*   AST (SGOT) U/L  --   --   --  22 33*   GLUCOSE mg/dL 232* 247* 186* 341* 335*        Lab Results   Component Value Date    PHOS 2.9 03/14/2021    MG 1.9 03/14/2021    PROTIME 13.4 03/12/2021    INR 1.06 03/12/2021     No components found for: POCGLUC  No components found for: A1C  Lab Results   Component Value Date    HDL 26 (L) 03/13/2021    LDL 73 03/13/2021     No components found for: B12  No results found for: TSH    Assessment/Plan     Hospital Problem List      Acute CVA (cerebrovascular accident) (CMS/[facility])    Benign essential HTN    Hyperlipidemia    DM2 (diabetes mellitus, type 2) (CMS/[facility])    Altered mental status    Impression:  · Acute left MCA stroke  · Decline in mental status with elevated temperature  · Subacute right parietal stroke  · Mild hyperlipidemia. LDL 73.  · Diabetes mellitus with poor control with hemoglobin A1c above the goal of 7  · Global aphasia  · Right hemiparesis  · Dementia  · Left sided tremor and tone concerning for PD  · dysphagia    Plan:  1. ST continue to work with patient.  2. Patient has poor nutrition intake and with decline in mental status, if does not improve may require RENAY.  3. Continue ASA 81 mg for now.  Patient has poor nutrition intake and with decline in mental status, if does not improve may require RENAY.  4. Consider transition to Plavix monotherapy at discharge but would like to see how patient does with nutrition incase she may require PEG.  5. No atrial fibrillation detected on cardiac telemetry so far.  6. DM  Management for A1C goal less than 7.  7. Continue Aricept.  8. Poor prognosis of functional neurologic outcome given the diagnosis of underlying dementia, likely Parkinson's Disease, and now with a dominant hemisphere stroke.  9. Anticipate discharge back to [facility] .       Kaci Bullock, APRN  03/17/21  10:40 CDT

## 2021-03-17 NOTE — PROGRESS NOTES
Continued Stay Note   [place]     Patient Name: Val Gonzalez  MRN: [id number]  Today's Date: 3/17/2021    Admit Date: 3/12/2021    Discharge Plan     Row Name 03/17/21 1052       Plan    Plan Comments  Plan is still for return to [facility] when pt is medically stable. Will notify [facility] at dc. Pt will need a new Covid test prior to DC.        Discharge Codes    No documentation.             BRET Rocha

## 2021-03-18 LAB
ANION GAP SERPL CALCULATED.3IONS-SCNC: 10 MMOL/L (ref 5–15)
BUN SERPL-MCNC: 23 MG/DL (ref 8–23)
BUN/CREAT SERPL: 16.5 (ref 7–25)
CALCIUM SPEC-SCNC: 8.7 MG/DL (ref 8.6–10.5)
CHLORIDE SERPL-SCNC: 104 MMOL/L (ref 98–107)
CO2 SERPL-SCNC: 27 MMOL/L (ref 22–29)
CREAT SERPL-MCNC: 1.39 MG/DL (ref 0.57–1)
GFR SERPL CREATININE-BSD FRML MDRD: 37 ML/MIN/1.73
GLUCOSE BLDC GLUCOMTR-MCNC: 128 MG/DL (ref 70–130)
GLUCOSE BLDC GLUCOMTR-MCNC: 228 MG/DL (ref 70–130)
GLUCOSE BLDC GLUCOMTR-MCNC: 281 MG/DL (ref 70–130)
GLUCOSE BLDC GLUCOMTR-MCNC: 337 MG/DL (ref 70–130)
GLUCOSE BLDC GLUCOMTR-MCNC: 381 MG/DL (ref 70–130)
GLUCOSE SERPL-MCNC: 157 MG/DL (ref 65–99)
POTASSIUM SERPL-SCNC: 3.5 MMOL/L (ref 3.5–5.2)
SODIUM SERPL-SCNC: 141 MMOL/L (ref 136–145)
WHOLE BLOOD HOLD SPECIMEN: NORMAL

## 2021-03-18 PROCEDURE — 82962 GLUCOSE BLOOD TEST: CPT

## 2021-03-18 PROCEDURE — 92526 ORAL FUNCTION THERAPY: CPT | Performed by: SPEECH-LANGUAGE PATHOLOGIST

## 2021-03-18 PROCEDURE — 99232 SBSQ HOSP IP/OBS MODERATE 35: CPT | Performed by: PSYCHIATRY & NEUROLOGY

## 2021-03-18 PROCEDURE — 63710000001 INSULIN REGULAR HUMAN PER 5 UNITS: Performed by: INTERNAL MEDICINE

## 2021-03-18 PROCEDURE — 63710000001 INSULIN DETEMIR PER 5 UNITS: Performed by: INTERNAL MEDICINE

## 2021-03-18 PROCEDURE — 80048 BASIC METABOLIC PNL TOTAL CA: CPT | Performed by: INTERNAL MEDICINE

## 2021-03-18 RX ADMIN — LOSARTAN POTASSIUM 50 MG: 50 TABLET, FILM COATED ORAL at 09:25

## 2021-03-18 RX ADMIN — HUMAN INSULIN 7 UNITS: 100 INJECTION, SOLUTION SUBCUTANEOUS at 19:21

## 2021-03-18 RX ADMIN — SODIUM CHLORIDE, PRESERVATIVE FREE 10 ML: 5 INJECTION INTRAVENOUS at 09:26

## 2021-03-18 RX ADMIN — SODIUM CHLORIDE, PRESERVATIVE FREE 10 ML: 5 INJECTION INTRAVENOUS at 09:00

## 2021-03-18 RX ADMIN — TRAZODONE HYDROCHLORIDE 50 MG: 50 TABLET ORAL at 20:58

## 2021-03-18 RX ADMIN — HUMAN INSULIN 6 UNITS: 100 INJECTION, SOLUTION SUBCUTANEOUS at 12:20

## 2021-03-18 RX ADMIN — DONEPEZIL HYDROCHLORIDE 5 MG: 5 TABLET, FILM COATED ORAL at 20:58

## 2021-03-18 RX ADMIN — ASPIRIN 81 MG: 81 TABLET, CHEWABLE ORAL at 09:25

## 2021-03-18 RX ADMIN — INSULIN DETEMIR 30 UNITS: 100 INJECTION, SOLUTION SUBCUTANEOUS at 20:57

## 2021-03-18 RX ADMIN — LEVOTHYROXINE SODIUM 75 MCG: 75 TABLET ORAL at 05:55

## 2021-03-18 RX ADMIN — CARVEDILOL 6.25 MG: 6.25 TABLET, FILM COATED ORAL at 09:25

## 2021-03-18 RX ADMIN — CARVEDILOL 6.25 MG: 6.25 TABLET, FILM COATED ORAL at 19:21

## 2021-03-18 RX ADMIN — ATORVASTATIN CALCIUM 40 MG: 40 TABLET, FILM COATED ORAL at 20:58

## 2021-03-18 RX ADMIN — SODIUM CHLORIDE, PRESERVATIVE FREE 10 ML: 5 INJECTION INTRAVENOUS at 20:58

## 2021-03-18 NOTE — PLAN OF CARE
Goal Outcome Evaluation:  The patient was awake and cooperative during therapy. Patient did not respond to SLP's questions or directions. The patient was provided with swallowing therapy regarding tolerating the least restrictive diet. The patient was given water, nectar thick liquid, and a cracker. The patient did not exhibit any overt signs or symptoms of aspiration with nectar thick liquids. However, during thin liquid trials, the patient took very large sips and required cues for smaller sips. The patient presented with a delayed cough once after thin liquid trial. During solid trials, the patient demonstrated a lengthy oral prep phase with residue left in the oral cavity after swallow. Oral and lingual strengthening exercises were attempted. However, the patient did not respond to cues. SLP plans to continue to follow and treat.

## 2021-03-18 NOTE — PLAN OF CARE
Goal Outcome Evaluation:  Plan of Care Reviewed With: patient  Progress: no change  Outcome Summary: Pt had no c/o pain this shift. Safety maintained. NIH 21 upon handoff to night shift nurses. Pt appeard more alert today but has been nonverbal. No other neuro changes this shift. Cont IV abx as ordered. Wound present to bottom, PRASANNA with ointment applied as needed. Accuchecks ACHS, sliding scale insulin administered as ordered. Pt tolerating pureed diet with nectar thick liquids. Medications in applesauce. O2 in place with . Tele on. Voiding incont per brief. Cont to monitor.

## 2021-03-18 NOTE — PLAN OF CARE
Goal Outcome Evaluation:     Progress: no change  Outcome Summary: Patient has remained alert but is mostly non-verbal. She will attempt to say something but is very garbled and incomprehensible. Mostly non-verbal. Incontinent of bowel and bladder. SCDS for VTE prevention. Turn Q2H. Tolerating pureed and nectar thickened liquids. Tolerated pills crushed in applesauce. ST with PACs on tele. Accu check ACHS. SSI given. Bed alarm on. Safety maintained.

## 2021-03-18 NOTE — PROGRESS NOTES
Neurology Progress Note      Chief Complaint:  F/u stroke    Subjective     Subjective:  Sitting up in bed. She is very alert this AM compared to the last 2 days.  Nurse at bedside and reports patient ate 75% of meal with assistance. She is able to grab a cup with her left hand and take a drink. ST has recommended nectar thick liquids. She attempts to speak today which she has not done for the last 2 days. She has been afebrile.    Medications:  Current Facility-Administered Medications   Medication Dose Route Frequency Provider Last Rate Last Admin   • acetaminophen (TYLENOL) tablet 650 mg  650 mg Oral Q4H PRN Jose Antonio Hi DO   650 mg at 03/16/21 0851    Or   • acetaminophen (TYLENOL) suppository 650 mg  650 mg Rectal Q4H PRN Jose Antonio Hi DO   650 mg at 03/13/21 0337   • aspirin chewable tablet 81 mg  81 mg Oral Daily Jose Antonio Hi DO   81 mg at 03/17/21 0904    Or   • aspirin suppository 300 mg  300 mg Rectal Daily Jose Antonio Hi DO   300 mg at 03/14/21 1023   • atorvastatin (LIPITOR) tablet 40 mg  40 mg Oral Nightly Kaci Bullock, APRN   40 mg at 03/17/21 2042   • carvedilol (COREG) tablet 6.25 mg  6.25 mg Oral BID With Meals Abdullahi Han MD   6.25 mg at 03/17/21 1959   • dextrose (D50W) 25 g/ 50mL Intravenous Solution 25 g  25 g Intravenous Q15 Min PRN Jose Antonio Hi DO       • dextrose (GLUTOSE) oral gel 15 g  15 g Oral Q15 Min PRN Jose Antonio Hi DO       • donepezil (ARICEPT) tablet 5 mg  5 mg Oral Nightly Jose Antonio Hi DO   5 mg at 03/17/21 2043   • enalaprilat (VASOTEC) injection 0.625 mg  0.625 mg Intravenous Q6H PRN Jose Antonio Hi DO   0.625 mg at 03/14/21 0510   • glucagon (human recombinant) (GLUCAGEN DIAGNOSTIC) injection 1 mg  1 mg Subcutaneous Q15 Min PRN Jose Antonio Hi DO       • hydrALAZINE (APRESOLINE) injection 5 mg  5 mg Intravenous Q6H PRN Jose Antonio Hi, DO   5 mg at 03/16/21 0521   • insulin detemir (LEVEMIR) injection 30 Units  30 Units  Subcutaneous Nightly Abdullahi Han MD   30 Units at 03/17/21 2044   • insulin regular (humuLIN R,novoLIN R) injection 0-9 Units  0-9 Units Subcutaneous Q6H Jose Antonio Hi DO   4 Units at 03/17/21 2328   • labetalol (NORMODYNE,TRANDATE) injection 20 mg  20 mg Intravenous Q4H PRN Jose Antonio Hi DO   20 mg at 03/12/21 1911   • levothyroxine (SYNTHROID, LEVOTHROID) tablet 75 mcg  75 mcg Oral Q AM Jose Antonio Hi DO   75 mcg at 03/18/21 0555   • losartan (COZAAR) tablet 50 mg  50 mg Oral Daily Jose Antonio Hi DO   50 mg at 03/17/21 0904   • ondansetron (ZOFRAN) injection 4 mg  4 mg Intravenous Q6H PRN Jose Antonio Hi, DO       • sodium chloride 0.9 % flush 10 mL  10 mL Intravenous PRN Jose Antonio Hi DO       • sodium chloride 0.9 % flush 10 mL  10 mL Intravenous Q12H Jose Antonio Hi DO   10 mL at 03/17/21 2043   • sodium chloride 0.9 % flush 10 mL  10 mL Intravenous PRN Jose Antonio Hi DO       • sodium chloride 0.9 % flush 10 mL  10 mL Intravenous Q12H Jose Antonio Hi, DO   10 mL at 03/17/21 2043   • sodium chloride 0.9 % flush 10 mL  10 mL Intravenous PRN Jose Antonio Hi DO       • traZODone (DESYREL) tablet 50 mg  50 mg Oral Nightly Jose Antonio Hi DO   50 mg at 03/17/21 2043       Review of Systems:   -A 14 point review of systems is not able to be completed secondary to aphasia.        Objective      Vital Signs  Temp:  [97.4 °F (36.3 °C)-98.2 °F (36.8 °C)] 98 °F (36.7 °C)  Heart Rate:  [58-89] 84  Resp:  [16-18] 18  BP: (112-169)/() 169/78    Telemetry S -ST  with PVC    Physical Exam:  General Exam:  Head:  Normocephalic, atraumatic  HEENT:  Neck supple  Fundoscopic Exam:  No signs of disc edema  CVS:  Regular rate and rhythm.  No murmurs  Carotid Examination:  No bruits  Lungs:  Clear to auscultation  Abdomen:  Nontender, nondistended  Extremities:  No signs of peripheral edema  Skin:  No rashes    Neurologic Exam:    Mental Status:    -Alert, when asked if her name  is Val she replies yes  -word-finding difficulties   aphasic. Named 0/2 objects  - dysarthria  -Follows simple commands    CN II:  Visual fields with no blink to threat on right.  Pupils equally reactive to light  CN III, IV, VI:  Extraocular Muscles full with no signs of nystagmus  CN V:  Facial sensory is symmetric with no asymmetries.  CN VII:  Facial motor asymmetric with right lower facial weakness  CN VIII:  Gross hearing intact bilaterally  CN IX:  Palate elevates symmetrically  CN X:  Palate elevates symmetrically  CN XI:  Shoulder shrug asymmetric and absent on right  CN XII:  Tongue protrudes to midline  Motor: (strength out of 5:  1= minimal movement, 2 = movement in plane of gravity, 3 = movement against gravity, 4 = movement against some resistance, 5 = full strength)    -Right Upper Ext: Proximal: 0 Distal: 0  -Left Upper Ext: Proximal: 5 Distal: 5    -Right Lower Ext: Proximal: 0 Distal: 0  -Left Lower Ext: Proximal: 3 Distal: 3 patient is lifting leg to command today as she would not do this the last 2 days.     DTR:  -Right   Bicep: 2+ Tricep: 2+ Brachoradialis: 2+   Patella: 2+ Ankle: 2+  Babinski  -Left   Bicep: 2+ Tricep: 2+ Brachoradialis: 2+   Patella: 2+ Ankle: 2+ Neg Babinski    Sensory:  -Intact to light touch, pinprick, temperature, pain, and proprioception    Coordination:  -no gross ataxia on left  Mild resting tremor on left.  Flaccid on left  Gait  -not attempted for safety reasons.       Results Review:    I reviewed the patient's new clinical results.    Results from last 7 days   Lab Units 03/16/21  1230 03/14/21  0132 03/13/21  0205   WBC 10*3/mm3 12.92* 9.86 10.01   HEMOGLOBIN g/dL 12.5 12.2 11.5*   HEMATOCRIT % 37.8 38.5 35.5   PLATELETS 10*3/mm3 398 382 311        Results from last 7 days   Lab Units 03/18/21  0443 03/16/21  1230 03/15/21  0524 03/13/21  0205 03/12/21  1410   SODIUM mmol/L 141 136 137 136 137   POTASSIUM mmol/L 3.5 3.2* 3.4* 4.5 5.2   CHLORIDE mmol/L 104 98  99 100 100   CO2 mmol/L 27.0 30.0* 25.0 26.0 28.0   BUN mg/dL 23 12 13 26* 29*   CREATININE mg/dL 1.39* 0.84 0.72 0.91 1.06*   CALCIUM mg/dL 8.7 8.8 8.9 8.6 9.2   BILIRUBIN mg/dL  --   --   --  0.4 0.4   ALK PHOS U/L  --   --   --  92 107   ALT (SGPT) U/L  --   --   --  33 39*   AST (SGOT) U/L  --   --   --  22 33*   GLUCOSE mg/dL 157* 232* 247* 341* 335*        Lab Results   Component Value Date    PHOS 2.9 03/14/2021    MG 1.9 03/14/2021    PROTIME 13.4 03/12/2021    INR 1.06 03/12/2021     No components found for: POCGLUC  No components found for: A1C  Lab Results   Component Value Date    HDL 26 (L) 03/13/2021    LDL 73 03/13/2021     No components found for: B12  No results found for: TSH    Assessment/Plan     Hospital Problem List      Acute CVA (cerebrovascular accident) (CMS/[facility])    Benign essential HTN    Hyperlipidemia    DM2 (diabetes mellitus, type 2) (CMS/[facility])    Altered mental status    Impression:  · Acute left MCA stroke  · Decline in mental status with elevated temperature  · Subacute right parietal stroke  · Mild hyperlipidemia. LDL 73.  · Diabetes mellitus with poor control with hemoglobin A1c above the goal of 7  · Global aphasia  · Right hemiparesis  · Dementia  · Left sided tremor and tone concerning for PD  · dysphagia    Plan:  1. ST continue to work with patient. ST recommending nectar thick liquids.  2. Patient has poor nutrition intake and with decline in mental status the last 2 days but 3/18/2021 has improved tremendously. Will need close monitoring of oral intake and if , if does not improve or declines may require RENAY.  3. Continue ASA 81 mg for now. Patient has poor nutrition intake and with decline in mental status, if does not improve may require RENAY.  4. Consider transition to Plavix monotherapy at discharge but would like to see how patient does with nutrition incase she may require PEG.  5. No atrial fibrillation detected on cardiac telemetry so far.  6. DM  Management for A1C  goal less than 7.  7. Continue Aricept.  8. Poor prognosis of functional neurologic outcome given the diagnosis of underlying dementia, likely Parkinson's Disease, and now with a dominant hemisphere stroke.  9. Anticipate discharge back to [facility] [facility]. ok to discharge when cleared by attending.         Kaci Bullock, ROMAIN  03/18/21  09:09 CDT

## 2021-03-18 NOTE — PLAN OF CARE
Goal Outcome Evaluation:        Outcome Summary: Pt is nonverbal except for occasionally grunts and incoherible speech. Turned q2 hours. Room air. Pureed diet with nectar thicken liquids. Meds crushed in apple sauce. Feeder. Infrequent cough. Sliding scale insulin administered as ordered. Tele on, NSR per tele. Voiding incontinent. Extreme difficulty moving RLE. RUE flaccid. Barrier cream applied to wounds on bottom. Safety maintained.

## 2021-03-18 NOTE — PROGRESS NOTES
1           [facility]  INPATIENT PROGRESS NOTE    Patient Name: Val Gonzalez  Date of Admission: 3/12/2021  Today's Date: 03/18/21  Length of Stay: 6  Primary Care Physician: Provider, No Known    Subjective   Chief Complaint: Follow-up  HPI   Issue encountered includes fever of 101 on March 16.  She appeared to be more drowsy at that time that head CT scan was repeated.  There was no other changes other than what is already known moderate sized left MCA territory infarction with no evidence of hemorrhagic transformation.  She has not received any acetaminophen-containing medication since March 16.    Initially had coagulase-negative Staphylococcus bacteremia felt to be contaminant in 1 out of 4 bottles    Subsequent blood culture showed no growth to date.  Urine culture remains no growth to date.  I empirically restarted her on antibiotic ceftriaxone and vancomycin on March 16 for couple of days.    Urinalysis showed trace bacteriuria but no growth again once again from the urine culture    Per neurology service can resume Plavix and continue on Aricept.  I have seen her eat as she was fed by nursing.  She appears to have some appetite  Not sure though how much she had consumed.  Ate about 75% and 50% breakfast and lunch respectively.   Review of Systems   Unable to participate in her care due to stroke.  Patient has aphasia  .     Objective    Temp:  [97.5 °F (36.4 °C)-98.2 °F (36.8 °C)] 97.9 °F (36.6 °C)  Heart Rate:  [63-90] 90  Resp:  [16-18] 16  BP: (148-169)/() 148/95  Physical Exam  She is actually awake and alert.  She is not able to follow commands.  She is nonverbal and noninteractive to me.  She tracks with     She is calm compared yesterday.  Normocephalic and atraumatic head  Diminished breath sounds without any adventitious sounds  S1-S2, regular rate and rhythm without crackles.  Telemetry showed sinus rhythm  Soft abdomen nontender, no  hypogastric fullness  No gross edema  SCD in place   skin on her back looks great w/o any gross breakdown      Results Review:  I have reviewed the labs, radiology results, and diagnostic studies.    Laboratory Data:   Results from last 7 days   Lab Units 03/16/21  1230 03/14/21  0132 03/13/21  0205   WBC 10*3/mm3 12.92* 9.86 10.01   HEMOGLOBIN g/dL 12.5 12.2 11.5*   HEMATOCRIT % 37.8 38.5 35.5   PLATELETS 10*3/mm3 398 382 311        Results from last 7 days   Lab Units 03/18/21  0443 03/16/21  1230 03/15/21  0524 03/13/21  0205 03/12/21  1410   SODIUM mmol/L 141 136 137 136 137   POTASSIUM mmol/L 3.5 3.2* 3.4* 4.5 5.2   CHLORIDE mmol/L 104 98 99 100 100   CO2 mmol/L 27.0 30.0* 25.0 26.0 28.0   BUN mg/dL 23 12 13 26* 29*   CREATININE mg/dL 1.39* 0.84 0.72 0.91 1.06*   CALCIUM mg/dL 8.7 8.8 8.9 8.6 9.2   BILIRUBIN mg/dL  --   --   --  0.4 0.4   ALK PHOS U/L  --   --   --  92 107   ALT (SGPT) U/L  --   --   --  33 39*   AST (SGOT) U/L  --   --   --  22 33*   GLUCOSE mg/dL 157* 232* 247* 341* 335*       Culture Data:   Blood Culture   Date Value Ref Range Status   03/16/2021 No growth at 2 days  Preliminary   03/16/2021 No growth at 2 days  Preliminary   03/12/2021 No growth at 5 days  Final   03/12/2021 Staphylococcus, coagulase negative (C)  Final     Comment:     Probable contaminant requires clinical correlation, susceptibility not performed unless requested by physician.       Urine Culture   Date Value Ref Range Status   03/16/2021 No growth  Final       Radiology Data:   Imaging Results (Last 24 Hours)     ** No results found for the last 24 hours. **          I have reviewed the patient's current medications.     Assessment/Plan     Active Hospital Problems    Diagnosis    • **Acute CVA (cerebrovascular accident) (CMS/HCC)    • Benign essential HTN    • Hyperlipidemia    • DM2 (diabetes mellitus, type 2) (CMS/HCC)    • Altered mental status        Problem list  · Multifocal acute CVA but predominantly left MCA  distribution  · History of chronic old right occipital lobe infarct  · Fever now resolved - ?  Unclear whether there is a neurologic component of the febrile illness.  He had a recurrence of fever this morning that required acetaminophen.  UA, chest x-ray were requested and blood culture  Added.  Work-up unrevealing.  · Hypertension -blood pressure ranged from blood pressure 169/86 Patient is on losartan and metoprolol.  Monitor blood pressure.  Will adjust medication accordingly.    · Hyperlipidemia -LDL 73; continue statin  · Diabetes mellitus type -blood sugar noted at 222, 177.  Patient is on Levemir and sliding scale.  Her A1c level is 10.6.    Levemir increased yesterday we will keep an eye on blood sugar today and see if further adjustment is needed. As needed D50 for hypoglycemia  · altered mental status  · Coag negative Staphylococcus bacteremia felt to be contaminant.  · Mild hypokalemia -check BMP           If patient remains no fever by tomorrow, I anticipate that she can be discharged home without any antibiotics.    Check Covid test prior to discharge to nursing home and rehab    aspirin, 81 mg, Oral, Daily   Or  aspirin, 300 mg, Rectal, Daily  atorvastatin, 40 mg, Oral, Nightly  carvedilol, 6.25 mg, Oral, BID With Meals  donepezil, 5 mg, Oral, Nightly  insulin detemir, 30 Units, Subcutaneous, Nightly  insulin regular, 0-9 Units, Subcutaneous, Q6H  levothyroxine, 75 mcg, Oral, Q AM  losartan, 50 mg, Oral, Daily  sodium chloride, 10 mL, Intravenous, Q12H  sodium chloride, 10 mL, Intravenous, Q12H  traZODone, 50 mg, Oral, Nightly          Discharge Planning: Possibly discharge tomorrow  Electronically signed by Abdullahi Han MD, 03/18/21, 16:04 CDT.

## 2021-03-18 NOTE — THERAPY TREATMENT NOTE
Acute Care - Speech Language Pathology   Swallow Treatment Note [facility]     Patient Name: Val Gonzalez  : 1945  MRN: [id number]  Today's Date: 3/18/2021               Admit Date: 3/12/2021  The patient was awake and cooperative during therapy. Patient did not respond to SLP's questions or directions. The patient was provided with swallowing therapy regarding tolerating the least restrictive diet. The patient was given water, nectar thick liquid, and a cracker. The patient did not exhibit any overt signs or symptoms of aspiration with nectar thick liquids. However, during thin liquid trials, the patient took very large sips and required cues for smaller sips. The patient presented with a delayed cough once after thin liquid trial. During solid trials, the patient demonstrated a lengthy oral prep phase with residue left in the oral cavity after swallow. Oral and lingual strengthening exercises were attempted. However, the patient did not respond to cues. SLP plans to continue to follow and treat.   Debby Gil [facility]  Visit Dx:     ICD-10-CM ICD-9-CM   1. Cerebrovascular accident (CVA), unspecified mechanism (CMS/HCC)  I63.9 434.91   2. Oropharyngeal dysphagia  R13.12 787.22   3. Impaired mobility and ADLs  Z74.09 V49.89    Z78.9      Patient Active Problem List   Diagnosis    Cerebrovascular accident (CVA) (CMS/HCC)    Acute CVA (cerebrovascular accident) (CMS/HCC)    Benign essential HTN    Hyperlipidemia    DM2 (diabetes mellitus, type 2) (CMS/HCC)    Altered mental status     Past Medical History:   Diagnosis Date    Altered mental status     Alzheimer's disease (CMS/HCC)     Arthritis     Cerebral infarction (CMS/HCC)     Dementia in other diseases classified elsewhere without behavioral disturbance (CMS/HCC)     Elevated cholesterol     Gastro-esophageal reflux disease without esophagitis     Hemiplegia and hemiparesis following cerebral infarction affecting right dominant side (CMS/HCC)      Hyperkalemia     Hyperlipidemia     Hypertension     Long term (current) use of insulin (CMS/HCC)     Other recurrent depressive disorders (CMS/HCC)     Other specified hypothyroidism     Pain, unspecified     Stroke (CMS/HCC)     Type 2 diabetes mellitus with hyperglycemia (CMS/HCC)      Past Surgical History:   Procedure Laterality Date    BACK SURGERY          SWALLOW EVALUATION (last 72 hours)        SLP Adult Swallow Evaluation       Row Name 03/18/21 0918 03/17/21 0934 03/15/21 1229             Rehab Evaluation    Document Type  therapy note (daily note)  -MM (r) SW (t) MM (c)  therapy note (daily note)  -MM  therapy note (daily note)  -MM      Subjective Information  no complaints  -MM (r) SW (t) MM (c)  no complaints  -MM  no complaints  -MM      Patient Observations  cooperative;lethargic  -MM (r) SW (t) MM (c)  lethargic;cooperative;agree to therapy  -MM  alert;cooperative;agree to therapy  -MM      Patient/Family/Caregiver Comments/Observations  no family present  no family present  -MM (r) SW (t) MM (c)  No family present.  -MM  Son present and then left room  -MM      Care Plan Review  --  care plan/treatment goals reviewed  -MM  care plan/treatment goals reviewed  -MM      Care Plan Review, Other Participant(s)  --  other (see comments) MIRANDA Ortiz   -ROSENDA  family;other (see comments) MIRANDA Vinson   -MM      Patient Effort  adequate  -MM (r) SW (t) MM (c)  adequate  -MM  adequate  -MM         Pain    Additional Documentation  Pain Scale: FACES Pre/Post-Treatment (Group)  -MM (r) SW (t) MM (c)  Pain Scale: FACES Pre/Post-Treatment (Group)  -MM  Pain Scale: FACES Pre/Post-Treatment (Group)  -MM         Pain Scale: FACES Pre/Post-Treatment    Pain: FACES Scale, Pretreatment  0-->no hurt  -MM (r) SW (t) MM (c)  0-->no hurt  -MM  0-->no hurt  -MM      Posttreatment Pain Rating  0-->no hurt  -MM (r) SW (t) MM (c)  0-->no hurt  -MM  0-->no hurt  -MM         Clinical Impression    Daily Summary of Progress (SLP)  --   progress toward functional goals is gradual  -MM  progress toward functional goals as expected  -MM      Barriers to Overall Progress (SLP)  --  Previous deficits   -MM  previous deficits  -MM      Plan for Continued Treatment (SLP)  --  Downgrade to nectar   -MM  Upgrade to thin  -MM         Swallow Goals (SLP)    Oral Nutrition/Hydration Goal Selection (SLP)  oral nutrition/hydration, SLP goal 1  -MM (r) SW (t) MM (c)  oral nutrition/hydration, SLP goal 1  -MM  oral nutrition/hydration, SLP goal 1  -MM      Labial Strengthening Goal Selection (SLP)  labial strengthening, SLP goal 1  -MM (r) SW (t) MM (c)  labial strengthening, SLP goal 1  -MM  labial strengthening, SLP goal 1  -MM      Lingual Strengthening Goal Selection (SLP)  lingual strengthening, SLP goal 1  -MM (r) SW (t) MM (c)  lingual strengthening, SLP goal 1  -MM  lingual strengthening, SLP goal 1  -MM      Pharyngeal Strengthening Exercise Goal Selection (SLP)  pharyngeal strengthening exercise, SLP goal 1  -MM (r) SW (t) MM (c)  pharyngeal strengthening exercise, SLP goal 1  -MM  pharyngeal strengthening exercise, SLP goal 1  -MM      Additional Documentation  labial strengthening goal selection (SLP);lingual strengthening goal selection (SLP);pharyngeal strengthening exercise goal selection (SLP)  -MM (r) SW (t) MM (c)  labial strengthening goal selection (SLP);lingual strengthening goal selection (SLP);pharyngeal strengthening exercise goal selection (SLP)  -MM  labial strengthening goal selection (SLP);lingual strengthening goal selection (SLP);pharyngeal strengthening exercise goal selection (SLP)  -MM         Oral Nutrition/Hydration Goal 1 (SLP)    Oral Nutrition/Hydration Goal 1, SLP  LTG: Patient will tolerate LRD with no overt s/s of aspiration  -MM (r) SW (t) MM (c)  LTG: Patient will tolerate LRD with no overt s/s of aspiration  -MM  LTG: Patient will tolerate LRD with no overt s/s of aspiration  -MM      Time Frame (Oral  Nutrition/Hydration Goal 1, SLP)  short term goal (STG);by discharge  -MM (r) SW (t) MM (c)  short term goal (STG);by discharge  -MM  short term goal (STG);by discharge  -MM      Barriers (Oral Nutrition/Hydration Goal 1, SLP)  pre-existing deficits  -MM (r) SW (t) MM (c)  pre-existing deficits  -MM  pre-existing deficits  -MM      Progress/Outcomes (Oral Nutrition/Hydration Goal 1, SLP)  continuing progress toward goal  -MM (r) SW (t) MM (c)  continuing progress toward goal  -MM  continuing progress toward goal  -MM         Labial Strengthening Goal 1 (SLP)    Activity (Labial Strengthening Goal 1, SLP)  increase labial tone  -MM (r) SW (t) MM (c)  increase labial tone  -MM  increase labial tone  -MM      Increase Labial Tone  labial resistance exercises  -MM (r) SW (t) MM (c)  labial resistance exercises  -MM  labial resistance exercises  -MM      [place]/Accuracy (Labial Strengthening Goal 1, SLP)  independently (over 90% accuracy)  -MM (r) SW (t) MM (c)  independently (over 90% accuracy)  -MM  independently (over 90% accuracy)  -MM      Time Frame (Labial Strengthening Goal 1, SLP)  short term goal (STG);by discharge  -MM (r) SW (t) MM (c)  short term goal (STG);by discharge  -MM  short term goal (STG);by discharge  -MM      Barriers (Labial Strengthening Goal 1, SLP)  pre-existing deficits  -MM (r) SW (t) MM (c)  pre-existing deficits  -MM  pre-existing deficits  -MM      Progress/Outcomes (Labial Strengthening Goal 1, SLP)  goal ongoing  -MM (r) SW (t) MM (c)  progress slower than expected  -MM  goal ongoing  -MM         Lingual Strengthening Goal 1 (SLP)    Activity (Lingual Strengthening Goal 1, SLP)  increase lingual tone/sensation/control/coordination/movement  -MM (r) SW (t) MM (c)  increase lingual tone/sensation/control/coordination/movement  -MM  increase lingual tone/sensation/control/coordination/movement  -MM      Increase Lingual Tone/Sensation/Control/Coordination/Movement  lingual movement  exercises  -MM (r) SW (t) MM (c)  lingual movement exercises  -MM  lingual movement exercises  -MM      [employer]/Accuracy (Lingual Strengthening Goal 1, SLP)  independently (over 90% accuracy)  -MM (r) SW (t) MM (c)  independently (over 90% accuracy)  -MM  independently (over 90% accuracy)  -MM      Time Frame (Lingual Strengthening Goal 1, SLP)  short term goal (STG);by discharge  -MM (r) SW (t) MM (c)  short term goal (STG);by discharge  -MM  short term goal (STG);by discharge  -MM      Barriers (Lingual Strengthening Goal 1, SLP)  pre-existing deficits  -MM (r) SW (t) MM (c)  pre-existing deficits  -MM  pre-existing deficits  -MM      Progress/Outcomes (Lingual Strengthening Goal 1, SLP)  goal ongoing  -MM (r) SW (t) MM (c)  progress slower than expected  -MM  goal ongoing  -MM         Pharyngeal Strengthening Exercise Goal 1 (SLP)    Activity (Pharyngeal Strengthening Goal 1, SLP)  increase timing  -MM (r) SW (t) MM (c)  increase timing  -MM  increase timing  -MM      Increase Timing  gustatory stimulation (sour/cold)  -MM (r) SW (t) MM (c)  gustatory stimulation (sour/cold)  -MM  gustatory stimulation (sour/cold)  -MM      [employer]/Accuracy (Pharyngeal Strengthening Goal 1, SLP)  independently (over 90% accuracy)  -MM (r) SW (t) MM (c)  independently (over 90% accuracy)  -MM  independently (over 90% accuracy)  -MM      Time Frame (Pharyngeal Strengthening Goal 1, SLP)  short term goal (STG);by discharge  -MM (r) SW (t) MM (c)  short term goal (STG);by discharge  -MM  short term goal (STG);by discharge  -MM      Barriers (Pharyngeal Strengthening Goal 1, SLP)  pre-existing deficits  -MM (r) SW (t) MM (c)  pre-existing deficits  -MM  pre-existing deficits  -MM      Progress/Outcomes (Pharyngeal Strengthening Goal 1, SLP)  goal ongoing  -MM (r) SW (t) MM (c)  progress slower than expected  -MM  goal ongoing  -MM            User Key  (r) = Recorded By, (t) = Taken By, (c) = Cosigned By      Initials Name  Effective Dates    MM Bria Mcpherson MS CCC-SLP 07/12/20 -     Debby Marie, Speech Therapy Student 01/18/21 -             EDUCATION  The patient has been educated in the following areas:   Dysphagia (Swallowing Impairment).    SLP Recommendation and Plan                                                             Plan of Care Reviewed With: patient, other (see comments) (MIRANDA Ortiz)  Progress: no change    SLP GOALS       Row Name 03/18/21 0918 03/17/21 0934 03/15/21 1229       Oral Nutrition/Hydration Goal 1 (SLP)    Oral Nutrition/Hydration Goal 1, SLP  LTG: Patient will tolerate LRD with no overt s/s of aspiration  -MM (r) SW (t) MM (c)  LTG: Patient will tolerate LRD with no overt s/s of aspiration  -MM  LTG: Patient will tolerate LRD with no overt s/s of aspiration  -MM    Time Frame (Oral Nutrition/Hydration Goal 1, SLP)  short term goal (STG);by discharge  -MM (r) SW (t) MM (c)  short term goal (STG);by discharge  -MM  short term goal (STG);by discharge  -MM    Barriers (Oral Nutrition/Hydration Goal 1, SLP)  pre-existing deficits  -MM (r) SW (t) MM (c)  pre-existing deficits  -MM  pre-existing deficits  -MM    Progress/Outcomes (Oral Nutrition/Hydration Goal 1, SLP)  continuing progress toward goal  -MM (r) SW (t) MM (c)  continuing progress toward goal  -MM  continuing progress toward goal  -MM       Labial Strengthening Goal 1 (SLP)    Activity (Labial Strengthening Goal 1, SLP)  increase labial tone  -MM (r) SW (t) MM (c)  increase labial tone  -MM  increase labial tone  -MM    Increase Labial Tone  labial resistance exercises  -MM (r) SW (t) MM (c)  labial resistance exercises  -MM  labial resistance exercises  -MM    [place]/Accuracy (Labial Strengthening Goal 1, SLP)  independently (over 90% accuracy)  -MM (r) SW (t) MM (c)  independently (over 90% accuracy)  -MM  independently (over 90% accuracy)  -MM    Time Frame (Labial Strengthening Goal 1, SLP)  short term goal (STG);by discharge   -MM (r) SW (t) MM (c)  short term goal (STG);by discharge  -MM  short term goal (STG);by discharge  -MM    Barriers (Labial Strengthening Goal 1, SLP)  pre-existing deficits  -MM (r) SW (t) MM (c)  pre-existing deficits  -MM  pre-existing deficits  -MM    Progress/Outcomes (Labial Strengthening Goal 1, SLP)  goal ongoing  -MM (r) SW (t) MM (c)  progress slower than expected  -MM  goal ongoing  -MM       Lingual Strengthening Goal 1 (SLP)    Activity (Lingual Strengthening Goal 1, SLP)  increase lingual tone/sensation/control/coordination/movement  -MM (r) SW (t) MM (c)  increase lingual tone/sensation/control/coordination/movement  -MM  increase lingual tone/sensation/control/coordination/movement  -MM    Increase Lingual Tone/Sensation/Control/Coordination/Movement  lingual movement exercises  -MM (r) SW (t) MM (c)  lingual movement exercises  -MM  lingual movement exercises  -MM    [place]/Accuracy (Lingual Strengthening Goal 1, SLP)  independently (over 90% accuracy)  -MM (r) SW (t) MM (c)  independently (over 90% accuracy)  -MM  independently (over 90% accuracy)  -MM    Time Frame (Lingual Strengthening Goal 1, SLP)  short term goal (STG);by discharge  -MM (r) SW (t) MM (c)  short term goal (STG);by discharge  -MM  short term goal (STG);by discharge  -MM    Barriers (Lingual Strengthening Goal 1, SLP)  pre-existing deficits  -MM (r) SW (t) MM (c)  pre-existing deficits  -MM  pre-existing deficits  -MM    Progress/Outcomes (Lingual Strengthening Goal 1, SLP)  goal ongoing  -MM (r) SW (t) MM (c)  progress slower than expected  -MM  goal ongoing  -MM       Pharyngeal Strengthening Exercise Goal 1 (SLP)    Activity (Pharyngeal Strengthening Goal 1, SLP)  increase timing  -MM (r) SW (t) MM (c)  increase timing  -MM  increase timing  -MM    Increase Timing  gustatory stimulation (sour/cold)  -MM (r) SW (t) MM (c)  gustatory stimulation (sour/cold)  -MM  gustatory stimulation (sour/cold)  -MM     [place]/Accuracy (Pharyngeal Strengthening Goal 1, SLP)  independently (over 90% accuracy)  -MM (r) SW (t) MM (c)  independently (over 90% accuracy)  -MM  independently (over 90% accuracy)  -MM    Time Frame (Pharyngeal Strengthening Goal 1, SLP)  short term goal (STG);by discharge  -MM (r) SW (t) MM (c)  short term goal (STG);by discharge  -MM  short term goal (STG);by discharge  -MM    Barriers (Pharyngeal Strengthening Goal 1, SLP)  pre-existing deficits  -MM (r) SW (t) MM (c)  pre-existing deficits  -MM  pre-existing deficits  -MM    Progress/Outcomes (Pharyngeal Strengthening Goal 1, SLP)  goal ongoing  -MM (r) SW (t) MM (c)  progress slower than expected  -MM  goal ongoing  -MM          User Key  (r) = Recorded By, (t) = Taken By, (c) = Cosigned By      Initials Name Provider Type    Bria Wong MS CCC-ZULEIKA Speech and Language Pathologist    Debby Marie, Speech Therapy Student Speech Therapy Student                Time Calculation:   Time Calculation- SLP       Row Name 03/18/21 0954             Time Calculation- SLP    SLP Start Time  0918  -MM (r) SW (t) MM (c)      SLP Stop Time  0935  -MM (r) SW (t) MM (c)      SLP Time Calculation (min)  17 min  -MM (r) SW (t)      SLP Received On  03/18/21  -MM (r) SW (t) MM (c)            User Key  (r) = Recorded By, (t) = Taken By, (c) = Cosigned By      Initials Name Provider Type    Bria Wong MS CCC-ZULEIKA Speech and Language Pathologist    Debby Marie, Speech Therapy Student Speech Therapy Student            Therapy Charges for Today       Code Description Service Date Service Provider Modifiers Qty    [id number] HC ST TREATMENT SWALLOW 1 3/17/2021 Bria Mcpherson MS CCC-SLP GN 1    [id number] HC ST TREATMENT SWALLOW 1 3/18/2021 Bria Mcpherson MS CCC-SLP GN 1                 MS NICCI Smith  3/18/2021

## 2021-03-19 VITALS
RESPIRATION RATE: 18 BRPM | HEIGHT: 68 IN | DIASTOLIC BLOOD PRESSURE: 94 MMHG | BODY MASS INDEX: 31.58 KG/M2 | SYSTOLIC BLOOD PRESSURE: 166 MMHG | OXYGEN SATURATION: 96 % | TEMPERATURE: 97.7 F | WEIGHT: 208.4 LBS | HEART RATE: 81 BPM

## 2021-03-19 PROBLEM — R50.9 FEBRILE ILLNESS: Status: ACTIVE | Noted: 2021-03-19

## 2021-03-19 PROBLEM — E87.6 HYPOKALEMIA: Status: ACTIVE | Noted: 2021-03-19

## 2021-03-19 LAB
GLUCOSE BLDC GLUCOMTR-MCNC: 289 MG/DL (ref 70–130)
GLUCOSE BLDC GLUCOMTR-MCNC: 300 MG/DL (ref 70–130)
SARS-COV-2 RNA PNL SPEC NAA+PROBE: NOT DETECTED

## 2021-03-19 PROCEDURE — 82962 GLUCOSE BLOOD TEST: CPT

## 2021-03-19 PROCEDURE — 63710000001 INSULIN REGULAR HUMAN PER 5 UNITS: Performed by: INTERNAL MEDICINE

## 2021-03-19 PROCEDURE — 25010000002 HYDRALAZINE PER 20 MG: Performed by: INTERNAL MEDICINE

## 2021-03-19 PROCEDURE — 99232 SBSQ HOSP IP/OBS MODERATE 35: CPT | Performed by: CLINICAL NURSE SPECIALIST

## 2021-03-19 PROCEDURE — 87635 SARS-COV-2 COVID-19 AMP PRB: CPT | Performed by: INTERNAL MEDICINE

## 2021-03-19 RX ORDER — SODIUM CHLORIDE 450 MG/100ML
125 INJECTION, SOLUTION INTRAVENOUS ONCE
Status: COMPLETED | OUTPATIENT
Start: 2021-03-19 | End: 2021-03-19

## 2021-03-19 RX ORDER — CLOPIDOGREL BISULFATE 75 MG/1
75 TABLET ORAL DAILY
Status: DISCONTINUED | OUTPATIENT
Start: 2021-03-19 | End: 2021-03-19 | Stop reason: HOSPADM

## 2021-03-19 RX ORDER — CLOPIDOGREL BISULFATE 75 MG/1
75 TABLET ORAL DAILY
Qty: 30 TABLET
Start: 2021-03-19

## 2021-03-19 RX ORDER — ATORVASTATIN CALCIUM 40 MG/1
40 TABLET, FILM COATED ORAL NIGHTLY
Start: 2021-03-19

## 2021-03-19 RX ORDER — CARVEDILOL 12.5 MG/1
12.5 TABLET ORAL 2 TIMES DAILY WITH MEALS
Start: 2021-03-19

## 2021-03-19 RX ORDER — CARVEDILOL 6.25 MG/1
12.5 TABLET ORAL 2 TIMES DAILY WITH MEALS
Status: DISCONTINUED | OUTPATIENT
Start: 2021-03-19 | End: 2021-03-19 | Stop reason: HOSPADM

## 2021-03-19 RX ADMIN — SODIUM CHLORIDE 125 ML/HR: 4.5 INJECTION, SOLUTION INTRAVENOUS at 10:35

## 2021-03-19 RX ADMIN — HUMAN INSULIN 6 UNITS: 100 INJECTION, SOLUTION SUBCUTANEOUS at 12:52

## 2021-03-19 RX ADMIN — SODIUM CHLORIDE, PRESERVATIVE FREE 10 ML: 5 INJECTION INTRAVENOUS at 08:12

## 2021-03-19 RX ADMIN — CLOPIDOGREL 75 MG: 75 TABLET, FILM COATED ORAL at 12:51

## 2021-03-19 RX ADMIN — CARVEDILOL 6.25 MG: 6.25 TABLET, FILM COATED ORAL at 08:11

## 2021-03-19 RX ADMIN — ASPIRIN 81 MG: 81 TABLET, CHEWABLE ORAL at 08:11

## 2021-03-19 RX ADMIN — HUMAN INSULIN 6 UNITS: 100 INJECTION, SOLUTION SUBCUTANEOUS at 00:27

## 2021-03-19 RX ADMIN — LEVOTHYROXINE SODIUM 75 MCG: 75 TABLET ORAL at 05:31

## 2021-03-19 RX ADMIN — HUMAN INSULIN 4 UNITS: 100 INJECTION, SOLUTION SUBCUTANEOUS at 06:56

## 2021-03-19 RX ADMIN — HYDRALAZINE HYDROCHLORIDE 5 MG: 20 INJECTION INTRAMUSCULAR; INTRAVENOUS at 05:31

## 2021-03-19 RX ADMIN — LOSARTAN POTASSIUM 50 MG: 50 TABLET, FILM COATED ORAL at 08:10

## 2021-03-19 NOTE — PLAN OF CARE
Goal Outcome Evaluation:  Plan of Care Reviewed With: patient  Progress: no change  Outcome Summary: Alert, mostly non-verbal. Attempts to speak but is garbled. No new neuro changes. Voiding-incontinent. Turn and reposition every 2hrs. Resting well.

## 2021-03-19 NOTE — DISCHARGE PLACEMENT REQUEST
"Raji Durbin (76 y.o. Female)     Date of Birth Social Security Number Address Home Phone MRN    [id number]  [id number] [address] [phone] [id number]    Alevism Marital Status          Other        Admission Date Admission Type Admitting Provider Attending Provider Department, Room/Bed    3/12/21 Emergency Abdullahi Han MD [place], Glenn Riego, MD [facility] 3A, 335/1    Discharge Date Discharge Disposition Discharge Destination         Rehab Facility or Unit (DC - External)              Attending Provider: Abdullahi Han MD    Allergies: Sulfa Antibiotics, Tetanus Toxoids    Isolation: None   Infection: COVID (rule out) (03/19/21)   Code Status: CPR    Ht: 172.7 cm (67.99\")   Wt: 94.5 kg (208 lb 6.4 oz)    Admission Cmt: None   Principal Problem: Acute CVA (cerebrovascular accident) (CMS/HCC) [I63.9]                 Active Insurance as of 3/12/2021     Primary Coverage     Payor Plan Insurance Group Employer/Plan Group    MEDICARE MEDICARE A & B      Payor Plan Address Payor Plan Phone Number Payor Plan Fax Number Effective Dates    [address] [phone]  5/1/1989 - None Entered    [address] [address]       Subscriber Name Subscriber Birth Date Member ID       RAJI DURBIN 1945 [id number]                 Emergency Contacts      (Rel.) Home Phone Work Phone Mobile Phone    Rod Durbin (Spouse) [phone] -- --    Edgar Lao (Son) [phone] -- --    esperanza mckoy [phone] -- --                 Discharge Summary      Abdullahi Han MD at 03/19/21 0919              [facility] Medicine Services  DISCHARGE SUMMARY       Date of Admission: 3/12/2021  Date of Discharge:  3/19/2021  Primary Care Physician: Provider, No Known    Discharge Diagnoses:  Active Hospital Problems    Diagnosis    • **Acute CVA (cerebrovascular accident) (CMS/HCC)    • Febrile illness    • " Hypokalemia    • Benign essential HTN    • Hyperlipidemia    • DM2 (diabetes mellitus, type 2) (CMS/formerly [facility])    • Altered mental status      Problem list  · Multifocal acute CVA but predominantly left MCA distribution  · 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 These were unrevealing  ·  Hypertension - Patient is on losartan and metoprolol.  adjusted medication accordingly.    · Hyperlipidemia -LDL 73; continue statin  · Diabetes mellitus type - Her A1c level is 10.6.    Levemir increased; monitor for hypoglycemia  · altered mental status - CVA; At risk for FTT.   · Coag negative Staphylococcus bacteremia felt to be contaminant.  · Mild hypokalemia - resolved       Presenting Problem/History of Present Illness:  Cerebrovascular accident (CVA), unspecified mechanism (CMS/formerly [facility]) [I63.9]  Acute CVA (cerebrovascular accident) (CMS/formerly [facility]) [I63.9]         Hospital Course  Patient is a 76 y.o. female   admitted for acute L MCA CVA.  She has known history of chronic old infarct of the occipital lobe.  Apparently on March 13 she had febrile illness with T-max of 101.5.  During that same time she also had severely elevated blood pressure as high as 210/147.  He was empirically started on antibiotic.  1 out of 4 blood culture bottle showed coagulase-negative Staphylococcus felt to be contaminant.     On March 16, she had fever of 101 and appeared more drowsy.  Repeat CT scan performed showed no other changes other than what is already known of the left MCA territory infarct without evidence of hemorrhagic transformation.  I recultured her.  Urine and blood showed no growth to date.  I restarted her on antibiotic.  She has no gross evidence of current infection that warrants continued antibiotic.  She has not required further antipyretic in the past couple of days.  Her mentation has improved.    She is eating with assistance.  Yesterday she ate 75% of her breakfast  and 50% of her lunch.  At this time it is not anticipated that she is needing any PEG.  Therefore, neurologist felt patient can be resumed on Plavix and continue on Aricept.  She remains at risk of FTT.  I recommend follow up BMP within 3 days at SNF.     Patient is felt to be at her best condition and medically appropriate for discharge.        Procedures Performed: None      Consults:  Dr. Angel/Dr. Penaloza      Pertinent Test Results:   Lab Results (last 7 days)     Procedure Component Value Units Date/Time    POC Glucose Once [[id number]]  (Abnormal) Collected: 03/19/21 0024    Specimen: Blood Updated: 03/19/21 0046     Glucose 289 mg/dL      Comment: : [id number] Jordan ([place]) AmandaMeter ID: [id number]       POC Glucose Once [[id number]]  (Abnormal) Collected: 03/18/21 2057    Specimen: Blood Updated: 03/18/21 2108     Glucose 381 mg/dL      Comment: : [id number] Enoch Carter AllieMeter ID: [id number]       POC Glucose Once [[id number]]  (Abnormal) Collected: 03/18/21 1618    Specimen: Blood Updated: 03/18/21 1640     Glucose 337 mg/dL      Comment: : [id number] Bandar BackcyMeter ID: [id number]       Blood Culture - Blood, Hand, Left [[id number]] Collected: 03/16/21 1230    Specimen: Blood from Hand, Left Updated: 03/18/21 1301     Blood Culture No growth at 2 days    Blood Culture - Blood, Arm, Left [[id number]] Collected: 03/16/21 1230    Specimen: Blood from Arm, Left Updated: 03/18/21 1301     Blood Culture No growth at 2 days    POC Glucose Once [[id number]]  (Abnormal) Collected: 03/18/21 1208    Specimen: Blood Updated: 03/18/21 1219     Glucose 281 mg/dL      Comment: : [id number] Enoch Carter AllieMeter ID: [id number]       POC Glucose Once [[id number]]  (Abnormal) Collected: 03/17/21 2319    Specimen: Blood Updated: 03/18/21 0649     Glucose 228 mg/dL      Comment: : [id number] Ivy WymanaMeter ID: [id number]       Extra Tubes [[id number]] Collected: 03/18/21 0506     Specimen: Blood, Venous Line Updated: 03/18/21 0615    Narrative:      The following orders were created for panel order Extra Tubes.  Procedure                               Abnormality         Status                     ---------                               -----------         ------                     Lavender Top[[id number]]                                     Final result                 Please view results for these tests on the individual orders.    Lavender Top [[id number]] Collected: 03/18/21 0500    Specimen: Blood Updated: 03/18/21 0615     Extra Tube hold for add-on     Comment: Auto resulted       POC Glucose Once [[id number]]  (Normal) Collected: 03/18/21 0549    Specimen: Blood Updated: 03/18/21 0600     Glucose 128 mg/dL      Comment: : [id number] Ivy WymanaMeter ID: [id number]       Basic Metabolic Panel [[id number]]  (Abnormal) Collected: 03/18/21 0443    Specimen: Blood Updated: 03/18/21 0530     Glucose 157 mg/dL      BUN 23 mg/dL      Creatinine 1.39 mg/dL      Sodium 141 mmol/L      Potassium 3.5 mmol/L      Chloride 104 mmol/L      CO2 27.0 mmol/L      Calcium 8.7 mg/dL      eGFR Non African Amer 37 mL/min/1.73      BUN/Creatinine Ratio 16.5     Anion Gap 10.0 mmol/L     Narrative:      GFR Normal >60  Chronic Kidney Disease <60  Kidney Failure <15      POC Glucose Once [[id number]]  (Abnormal) Collected: 03/17/21 2043    Specimen: Blood Updated: 03/17/21 2104     Glucose 274 mg/dL      Comment: : [id number] Martha NicoleMeter ID: [id number]       POC Glucose Once [[id number]]  (Abnormal) Collected: 03/17/21 1653    Specimen: Blood Updated: 03/17/21 1706     Glucose 267 mg/dL      Comment: : [id number] Sophie AmandaMeter ID: [id number]       Blood Culture - Blood, Arm, Right [[id number]] Collected: 03/12/21 1434    Specimen: Blood from Arm, Right Updated: 03/17/21 1600     Blood Culture No growth at 5 days    Urine Culture - Urine, Urine, Clean Catch [[id number]]  (Normal)  Collected: 03/16/21 1248    Specimen: Urine, Clean Catch Updated: 03/17/21 1555     Urine Culture No growth    POC Glucose Once [[id number]]  (Abnormal) Collected: 03/17/21 1234    Specimen: Blood Updated: 03/17/21 1246     Glucose 283 mg/dL      Comment: : [id number] Sophie AmandaMeter ID: [id number]       POC Glucose Once [[id number]]  (Abnormal) Collected: 03/17/21 0544    Specimen: Blood Updated: 03/17/21 0555     Glucose 200 mg/dL      Comment: : [id number] Darius LisaMeter ID: [id number]       POC Glucose Once [[id number]]  (Abnormal) Collected: 03/17/21 0047    Specimen: Blood Updated: 03/17/21 0058     Glucose 238 mg/dL      Comment: : [id number] Darius LisaMeter ID: [id number]       POC Glucose Once [[id number]]  (Abnormal) Collected: 03/16/21 2142    Specimen: Blood Updated: 03/16/21 2203     Glucose 176 mg/dL      Comment: : [id number] Darius LisaMeter ID: [id number]       POC Glucose Once [[id number]]  (Abnormal) Collected: 03/16/21 1705    Specimen: Blood Updated: 03/16/21 1717     Glucose 186 mg/dL      Comment: : [id number] Lucille CruzsayMeter ID: [id number]       Urinalysis With Culture If Indicated - Urine, Clean Catch [[id number]]  (Abnormal) Collected: 03/16/21 1248    Specimen: Urine, Clean Catch Updated: 03/16/21 1505     Color, UA Dark Yellow     Appearance, UA Clear     pH, UA 6.0     Specific Gravity, UA 1.013     Glucose, UA Negative     Ketones, UA Trace     Bilirubin, UA Small (1+)     Blood, UA Negative     Protein,  mg/dL (2+)     Leuk Esterase, UA Small (1+)     Nitrite, UA Negative     Urobilinogen, UA 4.0 E.U./dL    Urinalysis, Microscopic Only - Urine, Clean Catch [[id number]]  (Abnormal) Collected: 03/16/21 1248    Specimen: Urine, Clean Catch Updated: 03/16/21 1503     RBC, UA 3-5 /HPF      WBC, UA 6-12 /HPF      Bacteria, UA Trace /HPF      Squamous Epithelial Cells, UA 3-6 /HPF      Yeast, UA Large/3+ Budding Yeast /HPF      Methodology Manual Light  Microscopy    Basic Metabolic Panel [[id number]]  (Abnormal) Collected: 03/16/21 1230    Specimen: Blood Updated: 03/16/21 1308     Glucose 232 mg/dL      BUN 12 mg/dL      Creatinine 0.84 mg/dL      Sodium 136 mmol/L      Potassium 3.2 mmol/L      Chloride 98 mmol/L      CO2 30.0 mmol/L      Calcium 8.8 mg/dL      eGFR Non African Amer 66 mL/min/1.73      BUN/Creatinine Ratio 14.3     Anion Gap 8.0 mmol/L     Narrative:      GFR Normal >60  Chronic Kidney Disease <60  Kidney Failure <15      CBC & Differential [[id number]]  (Abnormal) Collected: 03/16/21 1230    Specimen: Blood Updated: 03/16/21 1242    Narrative:      The following orders were created for panel order CBC & Differential.  Procedure                               Abnormality         Status                     ---------                               -----------         ------                     CBC Auto Differential[[id number]]        Abnormal            Final result                 Please view results for these tests on the individual orders.    CBC Auto Differential [[id number]]  (Abnormal) Collected: 03/16/21 1230    Specimen: Blood Updated: 03/16/21 1242     WBC 12.92 10*3/mm3      RBC 4.48 10*6/mm3      Hemoglobin 12.5 g/dL      Hematocrit 37.8 %      MCV 84.4 fL      MCH 27.9 pg      MCHC 33.1 g/dL      RDW 13.2 %      RDW-SD 40.4 fl      MPV 11.1 fL      Platelets 398 10*3/mm3      Neutrophil % 83.1 %      Lymphocyte % 9.3 %      Monocyte % 6.4 %      Eosinophil % 0.2 %      Basophil % 0.2 %      Immature Grans % 0.8 %      Neutrophils, Absolute 10.75 10*3/mm3      Lymphocytes, Absolute 1.20 10*3/mm3      Monocytes, Absolute 0.83 10*3/mm3      Eosinophils, Absolute 0.02 10*3/mm3      Basophils, Absolute 0.02 10*3/mm3      Immature Grans, Absolute 0.10 10*3/mm3      nRBC 0.0 /100 WBC     POC Glucose Once [[id number]]  (Abnormal) Collected: 03/16/21 1209    Specimen: Blood Updated: 03/16/21 1220     Glucose 180 mg/dL      Comment: : [id number]  Lucille CainyMmanpreet ID: [id number]       POC Glucose Once [[id number]]  (Abnormal) Collected: 03/16/21 0655    Specimen: Blood Updated: 03/16/21 0706     Glucose 177 mg/dL      Comment: : [id number] Darius LisaMeter ID: [id number]       POC Glucose Once [[id number]]  (Abnormal) Collected: 03/16/21 0049    Specimen: Blood Updated: 03/16/21 0102     Glucose 222 mg/dL      Comment: : [id number] Darius LisaMeter ID: [id number]       POC Glucose Once [[id number]]  (Abnormal) Collected: 03/15/21 2213    Specimen: Blood Updated: 03/15/21 2233     Glucose 239 mg/dL      Comment: : [id number] Darius LisaMeter ID: [id number]       POC Glucose Once [[id number]]  (Abnormal) Collected: 03/15/21 1648    Specimen: Blood Updated: 03/15/21 1700     Glucose 278 mg/dL      Comment: : [id number] Ezio ReederjosephMeter ID: [id number]       POC Glucose Once [[id number]]  (Abnormal) Collected: 03/15/21 1108    Specimen: Blood Updated: 03/15/21 1120     Glucose 230 mg/dL      Comment: : [id number] Lucille CainyMeter ID: [id number]       Blood Culture - Blood, Arm, Left [[id number]]  (Abnormal) Collected: 03/12/21 1414    Specimen: Blood from Arm, Left Updated: 03/15/21 0738     Blood Culture Staphylococcus, coagulase negative     Comment: Probable contaminant requires clinical correlation, susceptibility not performed unless requested by physician.          Isolated from Anaerobic Bottle     Gram Stain Anaerobic Bottle Gram positive cocci    Narrative:      Probable contaminant requires clinical correlation, susceptibility not performed unless requested by physician.      Blood Culture ID, PCR - Blood, Arm, Left [[id number]]  (Abnormal) Collected: 03/12/21 1414    Specimen: Blood from Arm, Left Updated: 03/15/21 0732     BCID, PCR Staphylococcus spp, not aureus. Identification by BCID PCR.     Comment: Corrected result. Previous result was Staphylococcus aureus. Identification by BCID PCR. on 3/13/2021 at 1227 CST.        Extra Tubes [[id number]] Collected: 03/15/21 0524    Specimen: Blood, Venous Line Updated: 03/15/21 0645    Narrative:      The following orders were created for panel order Extra Tubes.  Procedure                               Abnormality         Status                     ---------                               -----------         ------                     Lavender Top[[id number]]                                     Final result                 Please view results for these tests on the individual orders.    Lavender Top [[id number]] Collected: 03/15/21 0524    Specimen: Blood Updated: 03/15/21 0645     Extra Tube hold for add-on     Comment: Auto resulted       Basic Metabolic Panel [[id number]]  (Abnormal) Collected: 03/15/21 0524    Specimen: Blood Updated: 03/15/21 0612     Glucose 247 mg/dL      BUN 13 mg/dL      Creatinine 0.72 mg/dL      Sodium 137 mmol/L      Potassium 3.4 mmol/L      Chloride 99 mmol/L      CO2 25.0 mmol/L      Calcium 8.9 mg/dL      eGFR Non African Amer 79 mL/min/1.73      BUN/Creatinine Ratio 18.1     Anion Gap 13.0 mmol/L     Narrative:      GFR Normal >60  Chronic Kidney Disease <60  Kidney Failure <15      POC Glucose Once [[id number]]  (Abnormal) Collected: 03/15/21 0533    Specimen: Blood Updated: 03/15/21 0554     Glucose 204 mg/dL      Comment: : [id number] Ivy PérezDealerSocketMeter ID: [id number]       POC Glucose Once [[id number]]  (Abnormal) Collected: 03/14/21 2123    Specimen: Blood Updated: 03/14/21 2136     Glucose 205 mg/dL      Comment: : [id number] Ivy NitroSecurityMeter ID: [id number]       POC Glucose Once [[id number]]  (Abnormal) Collected: 03/14/21 1644    Specimen: Blood Updated: 03/14/21 1655     Glucose 155 mg/dL      Comment: : [id number] Fredy HermosilloBLOVESMeter ID: [id number]       POC Glucose Once [[id number]]  (Abnormal) Collected: 03/14/21 1214    Specimen: Blood Updated: 03/14/21 1225     Glucose 201 mg/dL      Comment: : [id number] Ferdy CarsynMeter ID: [id number]        POC Glucose Once [[id number]]  (Abnormal) Collected: 03/14/21 0515    Specimen: Blood Updated: 03/14/21 0526     Glucose 170 mg/dL      Comment: : [id number] GonsalezMeter ID: [id number]       Basic Metabolic Panel [[id number]]  (Abnormal) Collected: 03/14/21 0132    Specimen: Blood Updated: 03/14/21 0425     Glucose 186 mg/dL      BUN 16 mg/dL      Creatinine 0.71 mg/dL      Sodium 138 mmol/L      Potassium 3.8 mmol/L      Comment: Slight hemolysis detected by analyzer. Results may be affected.        Chloride 102 mmol/L      CO2 27.0 mmol/L      Calcium 8.9 mg/dL      eGFR Non African Amer 80 mL/min/1.73      BUN/Creatinine Ratio 22.5     Anion Gap 9.0 mmol/L     Narrative:      GFR Normal >60  Chronic Kidney Disease <60  Kidney Failure <15      Phosphorus [[id number]]  (Normal) Collected: 03/14/21 0132    Specimen: Blood Updated: 03/14/21 0425     Phosphorus 2.9 mg/dL     Magnesium [[id number]]  (Normal) Collected: 03/14/21 0132    Specimen: Blood Updated: 03/14/21 0425     Magnesium 1.9 mg/dL     CBC & Differential [[id number]]  (Abnormal) Collected: 03/14/21 0132    Specimen: Blood Updated: 03/14/21 0334    Narrative:      The following orders were created for panel order CBC & Differential.  Procedure                               Abnormality         Status                     ---------                               -----------         ------                     CBC Auto Differential[[id number]]        Abnormal            Final result                 Please view results for these tests on the individual orders.    CBC Auto Differential [[id number]]  (Abnormal) Collected: 03/14/21 0132    Specimen: Blood Updated: 03/14/21 0334     WBC 9.86 10*3/mm3      RBC 4.46 10*6/mm3      Hemoglobin 12.2 g/dL      Hematocrit 38.5 %      MCV 86.3 fL      MCH 27.4 pg      MCHC 31.7 g/dL      RDW 13.2 %      RDW-SD 41.9 fl      MPV 11.3 fL      Platelets 382 10*3/mm3      Neutrophil % 75.7 %      Lymphocyte % 15.4 %       Monocyte % 5.9 %      Eosinophil % 1.7 %      Basophil % 0.4 %      Immature Grans % 0.9 %      Neutrophils, Absolute 7.46 10*3/mm3      Lymphocytes, Absolute 1.52 10*3/mm3      Monocytes, Absolute 0.58 10*3/mm3      Eosinophils, Absolute 0.17 10*3/mm3      Basophils, Absolute 0.04 10*3/mm3      Immature Grans, Absolute 0.09 10*3/mm3      nRBC 0.0 /100 WBC     POC Glucose Once [[id number]]  (Abnormal) Collected: 03/14/21 0000    Specimen: Blood Updated: 03/14/21 0014     Glucose 177 mg/dL      Comment: : [id number] Magdiel GonsalezMeter ID: [id number]       POC Glucose Once [[id number]]  (Abnormal) Collected: 03/13/21 1702    Specimen: Blood Updated: 03/13/21 1713     Glucose 261 mg/dL      Comment: : [id number] Sunny Mckenzie ID: [id number]       POC Glucose Once [[id number]]  (Abnormal) Collected: 03/13/21 0626    Specimen: Blood Updated: 03/13/21 0632     Glucose 249 mg/dL      Comment: : [id number] Jose ChungMeter ID: [id number]       Hemoglobin A1c [[id number]]  (Abnormal) Collected: 03/13/21 0205    Specimen: Blood Updated: 03/13/21 0309     Hemoglobin A1C 10.60 %     Narrative:      Hemoglobin A1C Ranges:    Increased Risk for Diabetes  5.7% to 6.4%  Diabetes                     >= 6.5%  Diabetic Goal                < 7.0%    Lipid Panel [[id number]]  (Abnormal) Collected: 03/13/21 0205    Specimen: Blood Updated: 03/13/21 0248     Total Cholesterol 114 mg/dL      Triglycerides 74 mg/dL      HDL Cholesterol 26 mg/dL      LDL Cholesterol  73 mg/dL      VLDL Cholesterol 15 mg/dL      LDL/HDL Ratio 2.82    Narrative:      Cholesterol Reference Ranges  (U.S. Department of Health and Human Services ATP III Classifications)    Desirable          <200 mg/dL  Borderline High    200-239 mg/dL  High Risk          >240 mg/dL      Triglyceride Reference Ranges  (U.S. Department of Health and Human Services ATP III Classifications)    Normal           <150 mg/dL  Borderline High  150-199 mg/dL  High              200-499 mg/dL  Very High        >500 mg/dL    HDL Reference Ranges  (U.S. Department of Health and Human Services ATP III Classifcations)    Low     <40 mg/dl (major risk factor for CHD)  High    >60 mg/dl ('negative' risk factor for CHD)        LDL Reference Ranges  (U.S. Department of Health and Human Services ATP III Classifcations)    Optimal          <100 mg/dL  Near Optimal     100-129 mg/dL  Borderline High  130-159 mg/dL  High             160-189 mg/dL  Very High        >189 mg/dL    Comprehensive Metabolic Panel [[id number]]  (Abnormal) Collected: 03/13/21 0205    Specimen: Blood Updated: 03/13/21 0248     Glucose 341 mg/dL      BUN 26 mg/dL      Creatinine 0.91 mg/dL      Sodium 136 mmol/L      Potassium 4.5 mmol/L      Chloride 100 mmol/L      CO2 26.0 mmol/L      Calcium 8.6 mg/dL      Total Protein 6.2 g/dL      Albumin 2.60 g/dL      ALT (SGPT) 33 U/L      AST (SGOT) 22 U/L      Alkaline Phosphatase 92 U/L      Total Bilirubin 0.4 mg/dL      eGFR Non African Amer 60 mL/min/1.73      Globulin 3.6 gm/dL      A/G Ratio 0.7 g/dL      BUN/Creatinine Ratio 28.6     Anion Gap 10.0 mmol/L     Narrative:      GFR Normal >60  Chronic Kidney Disease <60  Kidney Failure <15      CBC (No Diff) [[id number]]  (Abnormal) Collected: 03/13/21 0205    Specimen: Blood Updated: 03/13/21 0221     WBC 10.01 10*3/mm3      RBC 4.13 10*6/mm3      Hemoglobin 11.5 g/dL      Hematocrit 35.5 %      MCV 86.0 fL      MCH 27.8 pg      MCHC 32.4 g/dL      RDW 13.2 %      RDW-SD 41.6 fl      MPV 11.1 fL      Platelets 311 10*3/mm3     POC Glucose Once [[id number]]  (Abnormal) Collected: 03/13/21 0044    Specimen: Blood Updated: 03/13/21 0045     Glucose 316 mg/dL      Comment: : [id number] Jose ChungMeter ID: [id number]       POC Glucose Once [[id number]]  (Abnormal) Collected: 03/12/21 2238    Specimen: Blood Updated: 03/12/21 2240     Glucose 270 mg/dL      Comment: : [id number] Jose Baez ID: [id number]        POC Glucose Once [[id number]]  (Abnormal) Collected: 03/12/21 2047    Specimen: Blood Updated: 03/12/21 2058     Glucose 382 mg/dL      Comment: : [id number] Bryson MontesieMeter ID: [id number]       COVID PRE-OP / PRE-PROCEDURE SCREENING ORDER (NO ISOLATION) - Swab, Nasal Cavity [[id number]]  (Normal) Collected: 03/12/21 1638    Specimen: Swab from Nasal Cavity Updated: 03/12/21 1806    Narrative:      The following orders were created for panel order COVID PRE-OP / PRE-PROCEDURE SCREENING ORDER (NO ISOLATION) - Swab, Nasal Cavity.  Procedure                               Abnormality         Status                     ---------                               -----------         ------                     COVID-19,[facility] IN-RICHELLE...[[id number]]  Normal              Final result                 Please view results for these tests on the individual orders.    COVID-19,[facility] IN-HOUSE,Nasal Swab No Transport Media 3-4 HR TAT - Swab, Nasal Cavity [[id number]]  (Normal) Collected: 03/12/21 1638    Specimen: Swab from Nasal Cavity Updated: 03/12/21 1806     COVID19 Not Detected    Narrative:      Fact sheet for providers: https://www.fda.gov/media/792767/download     Fact sheet for patients: https://www.fda.gov/media/751278/download    Test performed by PCR.    Consider negative results in combination with clinical observations, patient history, and epidemiological information.    Comprehensive Metabolic Panel [[id number]]  (Abnormal) Collected: 03/12/21 1410    Specimen: Blood Updated: 03/12/21 1505     Glucose 335 mg/dL      BUN 29 mg/dL      Creatinine 1.06 mg/dL      Sodium 137 mmol/L      Potassium 5.2 mmol/L      Chloride 100 mmol/L      CO2 28.0 mmol/L      Calcium 9.2 mg/dL      Total Protein 6.9 g/dL      Albumin 2.90 g/dL      ALT (SGPT) 39 U/L      AST (SGOT) 33 U/L      Alkaline Phosphatase 107 U/L      Total Bilirubin 0.4 mg/dL      eGFR Non African Amer 50 mL/min/1.73      Globulin 4.0 gm/dL      A/G Ratio  0.7 g/dL      BUN/Creatinine Ratio 27.4     Anion Gap 9.0 mmol/L     Narrative:      GFR Normal >60  Chronic Kidney Disease <60  Kidney Failure <15      Lactic Acid, Plasma [[id number]]  (Normal) Collected: 03/12/21 1432    Specimen: Blood Updated: 03/12/21 1501     Lactate 2.0 mmol/L     Magnesium [[id number]]  (Normal) Collected: 03/12/21 1410    Specimen: Blood Updated: 03/12/21 1500     Magnesium 2.2 mg/dL     aPTT [[id number]]  (Abnormal) Collected: 03/12/21 1410    Specimen: Blood Updated: 03/12/21 1455     PTT 39.4 seconds     Protime-INR [[id number]]  (Normal) Collected: 03/12/21 1410    Specimen: Blood Updated: 03/12/21 1455     Protime 13.4 Seconds      INR 1.06    Urinalysis With Culture If Indicated - Urine, Catheter In/Out [[id number]]  (Abnormal) Collected: 03/12/21 1433    Specimen: Urine, Catheter In/Out Updated: 03/12/21 1447     Color, UA Dark Yellow     Appearance, UA Clear     pH, UA 5.5     Specific Gravity, UA 1.024     Glucose,  mg/dL (1+)     Ketones, UA Negative     Bilirubin, UA Negative     Blood, UA Negative     Protein, UA 30 mg/dL (1+)     Leuk Esterase, UA Trace     Nitrite, UA Negative     Urobilinogen, UA 1.0 E.U./dL    Urinalysis, Microscopic Only - Urine, Catheter In/Out [[id number]]  (Abnormal) Collected: 03/12/21 1433    Specimen: Urine, Catheter In/Out Updated: 03/12/21 1447     RBC, UA 3-5 /HPF      WBC, UA 3-5 /HPF      Bacteria, UA None Seen /HPF      Squamous Epithelial Cells, UA 0-2 /HPF      Hyaline Casts, UA 3-6 /LPF      Methodology Automated Microscopy    CBC & Differential [[id number]]  (Abnormal) Collected: 03/12/21 1410    Specimen: Blood Updated: 03/12/21 1445    Narrative:      The following orders were created for panel order CBC & Differential.  Procedure                               Abnormality         Status                     ---------                               -----------         ------                     CBC Auto Differential[[id number]]        Abnormal             Final result                 Please view results for these tests on the individual orders.    CBC Auto Differential [[id number]]  (Abnormal) Collected: 03/12/21 1410    Specimen: Blood Updated: 03/12/21 1445     WBC 11.73 10*3/mm3      RBC 4.62 10*6/mm3      Hemoglobin 12.9 g/dL      Hematocrit 40.6 %      MCV 87.9 fL      MCH 27.9 pg      MCHC 31.8 g/dL      RDW 13.5 %      RDW-SD 43.8 fl      MPV 11.4 fL      Platelets 349 10*3/mm3      Neutrophil % 79.6 %      Lymphocyte % 11.3 %      Monocyte % 6.0 %      Eosinophil % 1.0 %      Basophil % 0.4 %      Immature Grans % 1.7 %      Neutrophils, Absolute 9.33 10*3/mm3      Lymphocytes, Absolute 1.33 10*3/mm3      Monocytes, Absolute 0.70 10*3/mm3      Eosinophils, Absolute 0.12 10*3/mm3      Basophils, Absolute 0.05 10*3/mm3      Immature Grans, Absolute 0.20 10*3/mm3      nRBC 0.0 /100 WBC         Imaging Results (Last 7 Days)     Procedure Component Value Units Date/Time    XR Chest 1 View [[id number]] Collected: 03/16/21 1350     Updated: 03/16/21 1354    Narrative:      EXAM: XR CHEST 1 VW- 3/16/2021 1:47 PM CDT     HISTORY: fever, rule out aspiration; I63.9-Cerebral infarction,  unspecified; R13.12-Dysphagia, oropharyngeal phase; Z74.09-Other reduced  mobility; Z78.9-Other specified health status       COMPARISON: March 12, 2020.     TECHNIQUE: Frontal radiograph of the chest     FINDINGS:   The lungs are clear. Cardiac silhouette is normal. Vascular  calcifications present in the aortic arch..      The osseous structures and surrounding soft tissues demonstrate no acute  abnormality. Left shoulder prosthesis is unchanged. Cardiac monitoring  leads are present.          Impression:      1. No radiographic evidence of acute cardiopulmonary process.        This report was finalized on 03/16/2021 13:51 by Dr. Turner Jose MD.    CT Head Without Contrast [[id number]] Collected: 03/16/21 1121     Updated: 03/16/21 1127    Narrative:      EXAMINATION: CT  HEAD WO CONTRAST- 3/16/2021 11:21 AM CDT     HISTORY: change in mental status; I63.9-Cerebral infarction,  unspecified; R13.12-Dysphagia, oropharyngeal phase; Z74.09-Other reduced  mobility; Z78.9-Other specified health status.     DOSE: 2346 mGycm (Automatic exposure control technique was implemented  in an effort to keep the radiation dose as low as possible without  compromising image quality)     REPORT: Spiral CT of the head was performed without contrast,  reconstructed coronal and sagittal images were also obtained.     COMPARISON: CT head without contrast 3/12/2021.     No intracranial hemorrhage is identified, there is a moderate-sized area  of decreased attenuation in the distribution of the left MCA, which is  more conspicuous and compatible with edema and acute infarction. There  is no midline shift. Mild diffuse atrophy is present. There is mild  ventriculomegaly which correlates with a degree of atrophy. Decreased  attenuation in the periventricular white matter tracts is compatible  with moderate chronic small vessel white matter ischemic disease. Review  of bone windows is unremarkable.       Impression:      Increased conspicuity of a moderate-sized left MCA territory  infarction with no evidence of hemorrhagic transformation.     This report was finalized on 03/16/2021 11:24 by Dr. Nick Corey MD.    US Carotid Bilateral [[id number]] Collected: 03/15/21 1413     Updated: 03/15/21 1417    Narrative:      History: CVA       Impression:      Impression:  1. There is less than 50% stenosis of the right internal carotid artery.  2. There is less than 50% stenosis of the left internal carotid artery.  3. Antegrade flow is demonstrated in bilateral vertebral arteries.     Comments: Bilateral carotid vertebral arterial duplex scan was  performed.     Grayscale imaging shows intimal thickening and calcified elements at the  carotid bifurcation. The right internal carotid artery peak systolic  velocity is  111 cm/sec. The end-diastolic velocity is 22 cm/sec. The  right ICA/CCA ratio is approximately 1.0 . These findings correlate with  less than 50% stenosis of the right internal carotid artery.     Grayscale imaging shows intimal thickening and calcified elements at the  carotid bifurcation. The left internal carotid artery peak systolic  velocity is 95 cm/sec. The end-diastolic velocity is 27.8 cm/sec. The  left ICA/CCA ratio is approximately 0.6 . These findings correlate with  less than 50% stenosis of the left internal carotid artery.     Antegrade flow is demonstrated in bilateral vertebral arteries.  There is greater than 50% stenosis in the left common carotid artery and  bilateral external carotid arteries.  This report was finalized on 03/15/2021 14:14 by Dr. Jack Lorenzana MD.    CT Head Without Contrast [[id number]] Collected: 03/12/21 1858     Updated: 03/12/21 1903    Narrative:      EXAMINATION:  CT HEAD WO CONTRAST-  3/12/2021 6:39 PM CST     HISTORY: Stroke. Change in mental status.     TECHNIQUE: Multiple axial images were obtained through the brain without  contrast infusion. Multiplanar images were reconstructed.     DLP: 939 mGy-cm. Automated dosage control was utilized.     COMPARISON: 3/12/2021 3:07 PM.     FINDINGS: There are areas of low density in the left frontal-parietal  region similar to abnormality seen on brain MRI today which is  performed. The finding is consistent with acute infarct. There is no  hemorrhage. There is no shifting of the midline structures. There is  minimal sulcal effacement in the left frontal-parietal region. Low  density in the hemispheric white matter is otherwise nonspecific. There  is moderate atrophy with associated ventricular prominence. The  visualized paranasal sinuses and mastoid air cells are clear. The  current study is degraded by motion artifact.       Impression:      1. Acute left hemispheric infarct seen better on the recent MRI study.  2. Atrophy  and chronic small vessel disease.  3. No evidence of acute hemorrhage.     This report was finalized on 03/12/2021 19:00 by Dr. Shaq Kumar MD.    MRI Brain Without Contrast [[id number]] Collected: 03/12/21 1837     Updated: 03/12/21 1846    Narrative:      EXAMINATION:  MRI BRAIN WO CONTRAST-  3/12/2021 6:12 PM CST     HISTORY: Neuro deficit, acute, stroke suspected; I63.9-Cerebral  infarction, unspecified     TECHNIQUE: Multiplanar imaging was performed in a high field magnet.     COMPARISON: No comparison study.     FINDINGS: There are multifocal acute infarcts in the left hemisphere  predominantly in left MCA distribution. There is high signal on the  diffusion sequence in the right occipital lobe without definite  diffusion restriction on the ADC map images. With fluid signal on T2  images consistent with T2 shine through artifact. On the gradient echo  sequence, there is an area of susceptibility artifact in the upper  portion of the right sylvian fissure. There are few areas of  susceptibility artifact in sulci in the left frontal-parietal region.  There is moderate to severe diffuse atrophy with associated ventricular  prominence. There is T2 high signal in the hemispheric white matter and  the varun that is nonspecific and likely due to chronic small vessel  disease.       Impression:      1. Multifocal areas of acute infarct in the left frontal-parietal region  predominantly in a left MCA distribution.  2. Chronic infarct in the right occipital lobe.  3. Areas of T2 high signal in the hemispheric white matter and varun  without diffusion signal abnormality most likely due to chronic small  vessel disease.  4. Moderate atrophy with associated ventricular prominence.     Results called to the emergency room at 6:42 PM.        This report was finalized on 03/12/2021 18:43 by Dr. Shaq Kumar MD.    XR Abdomen KUB [[id number]] Collected: 03/12/21 1725     Updated: 03/12/21 1729    Narrative:      EXAMINATION:   XR ABDOMEN KUB-  3/12/2021 5:13 PM CST     HISTORY: MRI; I63.9-Cerebral infarction, unspecified.     COMPARISON: No comparison study.     TECHNIQUE: Supine abdomen.      FINDINGS:   There has been prior posterior instrumented fusion in the  lumbar region. There is scoliosis and degenerative change of the spine.  There has been prior left hip arthroplasty. The bowel gas pattern is  normal. There is no organomegaly or mass effect.       Impression:      No acute findings.        This report was finalized on 03/12/2021 17:26 by Dr. Shaq Kumar MD.    CT Head Without Contrast [[id number]] Collected: 03/12/21 1538     Updated: 03/12/21 1548    Narrative:      Exam: CT HEAD WO CONTRAST-     Indication: Neuro deficit, acute, stroke suspected     Comparison: None     Dose length product: 1887 mGy cm. Automated exposure control was also  utilized to decrease patient radiation dose.     Findings:     No evidence of intracranial hemorrhage. Gray-white differentiation is  maintained. Chronic microvascular ischemic white matter change. Old  lacunar infarct in the LEFT caudate. Focal hypodensity in the  periventricular and subcortical LEFT frontal white matter on image 22  also favored to be related to old chronic microvascular ischemia. No  midline shift or mass effect. Lateral ventricles are nondilated. Basilar  cisterns are patent. Global cerebral volume loss is noted. Vascular  calcification. No acute orbital finding. Bilateral small mastoid  effusions. Paranasal sinuses are clear. No acute osseous findings.       Impression:      Impression:     1.  No acute intracranial findings.  2.  Chronic microvascular ischemic white matter change and old appearing  LEFT caudate lacunar infarct.  3.  Recommend brain MRI if there is clinical concern for acute ischemia.  4.  Small mastoid effusions.  This report was finalized on 03/12/2021 15:45 by Dr. Rogerio Jaimes MD.    XR Forearm 2 View Left [[id number]] Collected: 03/12/21 1544      Updated: 03/12/21 1548    Narrative:      Left forearm, 2 views 3/12/2021 3:19 PM CST     HISTORY: pain     COMPARISON: NONE     FINDINGS:  Frontal and lateral radiographs of the left forearm were obtained.     There is no evidence of fracture. First carpometacarpal, triscaphe and  radiocarpal joint osteoarthritis changes. Additional osteoarthritis  changes at the elbow joint. Nonspecific soft tissue swelling along the  dorsal aspect of the forearm, especially along the more proximal aspect.       Impression:      1. Nonspecific soft tissue swelling along the more proximal aspect of  the dorsal forearm. No underlying bony abnormality.  2. 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     This report was finalized on 03/12/2021 15:45 by Dr Vinod Conner, .    XR Humerus Left [[id number]] Collected: 03/12/21 1542     Updated: 03/12/21 1547    Narrative:      Left humerus, 2 views 3/12/2021 3:19 PM CST     HISTORY: pain     COMPARISON: NONE     FINDINGS:  Frontal and lateral radiographs of the left humerus were obtained.     Left shoulder arthroplasty. No periprosthetic fracture. In general, no  fractures seen. 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 No gross  soft tissue abnormality.       Impression:      1. Left shoulder arthroplasty. No hardware complication. No acute  fracture.  2. 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     This report was finalized on 03/12/2021 15:44 by Dr Vinod Conner, .    XR Chest 1 View [[id number]] Collected: 03/12/21 1539     Updated: 03/12/21 1542    Narrative:      Frontal upright radiograph of the chest 3/12/2021 3:19 PM CST     HISTORY: Weakness     COMPARISON: None.     FINDINGS:      No lung consolidation. No pleural effusion or pneumothorax. The  cardiomediastinal silhouette and pulmonary vascularity are within normal  limits. Left shoulder arthroplasty. No acute osseous injury.       Impression:      1. No radiographic evidence of acute cardiopulmonary process.    "     This report was finalized on 03/12/2021 15:39 by Dr Vinod Conner, .        Condition on Discharge: Stable  Physical Exam on Discharge:  /94 (BP Location: Left arm, Patient Position: Lying)   Pulse 81   Temp 97.7 °F (36.5 °C) (Axillary)   Resp 18   Ht 172.7 cm (67.99\")   Wt 94.5 kg (208 lb 6.4 oz)   SpO2 96%   Breastfeeding No   BMI 31.69 kg/m²   Physical Exam   She had  980ml PO input from yesterday and 880ml  PO  input the other day.  She is unable to tell if she is needing to go to the bathroom.  She is wearing diaper and nurse reports it's been wet.    She isawake and alert.  She is not able to follow commands.  She is nonverbal and noninteractive to me.  Her eyes tracks my position   She is calm   Normocephalic and atraumatic head  Diminished breath sounds without any adventitious sounds  S1-S2, regular rate and rhythm without crackles.    Soft abdomen nontender, no hypogastric fullness  No gross edema  SCD in place   skin on her back looks great w/o any gross breakdown  No significant change from yesterday  Discharge Disposition:      Discharge Medications:     Discharge Medications      New Medications      Instructions Start Date   atorvastatin 40 MG tablet  Commonly known as: LIPITOR   40 mg, Oral, Nightly      carvedilol 12.5 MG tablet  Commonly known as: COREG   12.5 mg, Oral, 2 Times Daily With Meals      clopidogrel 75 MG tablet  Commonly known as: PLAVIX   75 mg, Oral, Daily         Changes to Medications      Instructions Start Date   insulin detemir 100 UNIT/ML injection  Commonly known as: LEVEMIR  What changed:   · how much to take  · when to take this   40 Units, Subcutaneous, Nightly         Continue These Medications      Instructions Start Date   donepezil 5 MG tablet  Commonly known as: ARICEPT   5 mg, Oral, Nightly      insulin lispro 100 UNIT/ML injection  Commonly known as: humaLOG   Inject under the skin TID AC + QHS as per sliding scale: 151-200= 4 units 201-250= 6 " units 251-300= 8 units 301-350= 12 units 351-400= 16 units >400= 20 units and call MD      levothyroxine 75 MCG tablet  Commonly known as: SYNTHROID, LEVOTHROID   75 mcg, Oral, Daily      losartan 50 MG tablet  Commonly known as: COZAAR   100 mg, Oral, Daily      pantoprazole 40 MG EC tablet  Commonly known as: PROTONIX   40 mg, Oral, Daily      sucralfate 1 g tablet  Commonly known as: CARAFATE   1 g, Oral, 4 Times Daily         Stop These Medications    aspirin 81 MG chewable tablet     cephalexin 500 MG capsule  Commonly known as: KEFLEX     lovastatin 40 MG tablet  Commonly known as: MEVACOR     metoprolol tartrate 25 MG tablet  Commonly known as: LOPRESSOR     oxyCODONE 5 MG capsule  Commonly known as: OXY-IR     potassium chloride 20 MEQ CR tablet  Commonly known as: K-DUR,KLOR-CON     traZODone 50 MG tablet  Commonly known as: DESYREL            Discharge Diet:   Diet Instructions     Diet: Dysphagia; Nectar / Syrup Thick Liquids; Pureed      Discharge Diet: Dysphagia    Fluid Consistency: Nectar / Syrup Thick Liquids    Pureed Options: Pureed              Activity at Discharge:   Activity Instructions     Gradually Increase Activity Until at Pre-Hospitalization Level            Follow-up Appointments:   Skilled nursing facility within 24 to 48 hours; recommend monitoring blood pressure with recent increase in carvedilol; monitor for hypoglycemia with recent increase in Levemir; recommend BMP within 1 week.  Patient remains at risk for failure to thrive causing dehydration    Neurology within 4 weeks.  Test Results Pending at Discharge:   Pending Labs     Order Current Status    Blood Culture - Blood, Arm, Left Preliminary result    Blood Culture - Blood, Hand, Left Preliminary result           Electronically signed by Abdullahi Han MD, 3/19/2021, 09:53 CDT.    Time:> 30 mins      Part of this note may be an electronic transcription/translation of spoken language to printed text using the Dragon  Dictation System.            Electronically signed by Abdullahi Han MD at 03/19/21 0954       Discharge Order (From admission, onward)     Start     Ordered    03/19/21 0949  Discharge patient  Once     Expected Discharge Date: 03/19/21    Discharge Disposition: Rehab Facility or Unit (DC - External)    Physician of Record for Attribution - Please select from Treatment Team: ABDULLAHI HAN [[id number]]    Review needed by CMO to determine Physician of Record: No       Question Answer Comment   Physician of Record for Attribution - Please select from Treatment Team ABDULLAHI HAN    Review needed by CMO to determine Physician of Record No        03/19/21 0953

## 2021-03-19 NOTE — PLAN OF CARE
Goal Outcome Evaluation:  Plan of Care Reviewed With: patient     Outcome Summary: Alert and smiles and winks at times- aphasic today but able to follow some commands. NIH=21, total care given, incont of bladder/bowel. Skin intact- area to coccyx photographed, no S/S of pain at this time. VSS, will go by ambulance to [facility], right arm flaccid-dentures present in mouth.

## 2021-03-19 NOTE — PROGRESS NOTES
Neurology Progress Note      Chief Complaint:  F/u stroke    Subjective     Subjective:  Lying in bed with HOB elevated. Nursing student at beside and she had assisted patient with breakfast and reported patient ate majority of meal. Patient is drawing right lower extremity up at times and at times seems to be purposeful but other times may be spasm. She attempts to speak but is garble.d     Afebrile last 48 hours    Medications:  Current Facility-Administered Medications   Medication Dose Route Frequency Provider Last Rate Last Admin   • acetaminophen (TYLENOL) tablet 650 mg  650 mg Oral Q4H PRN Jose Antonio Hi DO   650 mg at 03/16/21 0851    Or   • acetaminophen (TYLENOL) suppository 650 mg  650 mg Rectal Q4H PRN Jose Antonio Hi DO   650 mg at 03/13/21 0337   • atorvastatin (LIPITOR) tablet 40 mg  40 mg Oral Nightly Kaci Bullock APRN   40 mg at 03/18/21 2058   • carvedilol (COREG) tablet 12.5 mg  12.5 mg Oral BID With Meals Abdullahi Han MD       • clopidogrel (PLAVIX) tablet 75 mg  75 mg Oral Daily Abdullahi Han MD       • dextrose (D50W) 25 g/ 50mL Intravenous Solution 25 g  25 g Intravenous Q15 Min PRN Jose Antonio Hi DO       • dextrose (GLUTOSE) oral gel 15 g  15 g Oral Q15 Min PRN Jose Antonio Hi DO       • donepezil (ARICEPT) tablet 5 mg  5 mg Oral Nightly Jose Antonio Hi DO   5 mg at 03/18/21 2058   • enalaprilat (VASOTEC) injection 0.625 mg  0.625 mg Intravenous Q6H PRN Jose Antonio Hi DO   0.625 mg at 03/14/21 0510   • glucagon (human recombinant) (GLUCAGEN DIAGNOSTIC) injection 1 mg  1 mg Subcutaneous Q15 Min PRN Jose Antonio Hi DO       • hydrALAZINE (APRESOLINE) injection 5 mg  5 mg Intravenous Q6H PRN Jose Antonio Hi DO   5 mg at 03/19/21 0531   • insulin detemir (LEVEMIR) injection 40 Units  40 Units Subcutaneous Nightly Abdullahi Han MD       • insulin regular (humuLIN R,novoLIN R) injection 0-9 Units  0-9 Units Subcutaneous Q6H [place],  Jose Antonio DOBBS DO   4 Units at 03/19/21 0656   • labetalol (NORMODYNE,TRANDATE) injection 20 mg  20 mg Intravenous Q4H PRN Jose Antonio Hi DO   20 mg at 03/12/21 1911   • levothyroxine (SYNTHROID, LEVOTHROID) tablet 75 mcg  75 mcg Oral Q AM Jose Antonio Hi DO   75 mcg at 03/19/21 0531   • losartan (COZAAR) tablet 50 mg  50 mg Oral Daily Jose Antonio Hi DO   50 mg at 03/19/21 0810   • ondansetron (ZOFRAN) injection 4 mg  4 mg Intravenous Q6H PRN Jose Antonio Hi DO       • sodium chloride 0.9 % flush 10 mL  10 mL Intravenous PRN Jose Antonio Hi DO       • sodium chloride 0.9 % flush 10 mL  10 mL Intravenous Q12H Jose Antonio Hi DO   10 mL at 03/17/21 2043   • sodium chloride 0.9 % flush 10 mL  10 mL Intravenous PRN Jose Antonio Hi DO       • sodium chloride 0.9 % flush 10 mL  10 mL Intravenous Q12H Jose Antonio Hi DO   10 mL at 03/19/21 0812   • sodium chloride 0.9 % flush 10 mL  10 mL Intravenous PRN Jose Antonio Hi DO           Review of Systems:   -A 14 point review of systems is not able to be completed secondary to aphasia.       Objective      Vital Signs  Temp:  [97.5 °F (36.4 °C)-98 °F (36.7 °C)] 97.7 °F (36.5 °C)  Heart Rate:  [81-96] 81  Resp:  [16-18] 18  BP: (143-179)/(80-95) 166/94    Telemetry S 80-88      Physical Exam:  General Exam:  Head:  Normocephalic, atraumatic  HEENT:  Neck supple  Fundoscopic Exam:  No signs of disc edema  CVS:  Regular rate and rhythm.  No murmurs  Carotid Examination:  No bruits  Lungs:  Clear to auscultation  Abdomen:  Nontender, nondistended  Extremities:  No signs of peripheral edema  Skin:  No rashes     Neurologic Exam:     Mental Status:    -Alert, when asked if her name is Val she replies yes  -word-finding difficulties   aphasic. Named 0/2 objects  - dysarthria  -Follows simple commands     CN II:  Visual fields with no blink to threat on right.  Pupils equally reactive to light  CN III, IV, VI:  Extraocular Muscles full with no signs of  nystagmus  CN V:  Facial sensory is symmetric with no asymmetries.  CN VII:  Facial motor asymmetric with right lower facial weakness  CN VIII:  Gross hearing intact bilaterally  CN IX:  Palate elevates symmetrically  CN X:  Palate elevates symmetrically  CN XI:  Shoulder shrug asymmetric and absent on right  CN XII:  Tongue protrudes to midline  Motor: (strength out of 5:  1= minimal movement, 2 = movement in plane of gravity, 3 = movement against gravity, 4 = movement against some resistance, 5 = full strength)     -Right Upper Ext: Proximal: 0 Distal: 0  -Left Upper Ext: Proximal: 5   Distal: 5     -Right Lower Ext: Proximal: 0 Distal: 0  -Left Lower Ext: Proximal: 3   Distal: 3 patient is lifting leg to command today as she would not do this the last 2 days.      DTR:  -Right              Bicep: 2+         Tricep: 2+        Brachoradialis: 2+              Patella: 2+       Ankle: 2+          Babinski  -Left              Bicep: 2+         Tricep: 2+        Brachoradialis: 2+              Patella: 2+       Ankle: 2+         Neg Babinski     Sensory:  -Intact to light touch, pinprick, temperature, pain, and proprioception     Coordination:  -no gross ataxia on left  Mild resting tremor on left.  Flaccid on left  Gait  -not attempted for safety reasons.     Results Review:    I reviewed the patient's new clinical results.    Results from last 7 days   Lab Units 03/16/21  1230 03/14/21  0132 03/13/21  0205   WBC 10*3/mm3 12.92* 9.86 10.01   HEMOGLOBIN g/dL 12.5 12.2 11.5*   HEMATOCRIT % 37.8 38.5 35.5   PLATELETS 10*3/mm3 398 382 311        Results from last 7 days   Lab Units 03/18/21  0443 03/16/21  1230 03/15/21  0524 03/13/21  0205 03/12/21  1410   SODIUM mmol/L 141 136 137 136 137   POTASSIUM mmol/L 3.5 3.2* 3.4* 4.5 5.2   CHLORIDE mmol/L 104 98 99 100 100   CO2 mmol/L 27.0 30.0* 25.0 26.0 28.0   BUN mg/dL 23 12 13 26* 29*   CREATININE mg/dL 1.39* 0.84 0.72 0.91 1.06*   CALCIUM mg/dL 8.7 8.8 8.9 8.6 9.2    BILIRUBIN mg/dL  --   --   --  0.4 0.4   ALK PHOS U/L  --   --   --  92 107   ALT (SGPT) U/L  --   --   --  33 39*   AST (SGOT) U/L  --   --   --  22 33*   GLUCOSE mg/dL 157* 232* 247* 341* 335*        Lab Results   Component Value Date    PHOS 2.9 03/14/2021    MG 1.9 03/14/2021    PROTIME 13.4 03/12/2021    INR 1.06 03/12/2021     No components found for: POCGLUC  No components found for: A1C  Lab Results   Component Value Date    HDL 26 (L) 03/13/2021    LDL 73 03/13/2021     No components found for: B12  No results found for: TSH    Assessment/Plan     Hospital Problem List      Acute CVA (cerebrovascular accident) (CMS/[facility])    Benign essential HTN    Hyperlipidemia    DM2 (diabetes mellitus, type 2) (CMS/[facility])    Altered mental status    Febrile illness    Hypokalemia    Impression:  · Acute left MCA stroke  · Decline in mental status with elevated temperature  · Subacute right parietal stroke  · Mild hyperlipidemia. LDL 73.  · Diabetes mellitus with poor control with hemoglobin A1c above the goal of 7  · Global aphasia  · Right hemiparesis  · Dementia  · Left sided tremor and tone concerning for PD  · dysphagia    Plan:  . ST continue to work with patient. ST recommending nectar thick liquids.  2. Patient has poor nutrition intake and with decline in mental status the last 2 days but 3/18/2021 has improved tremendously. Will need close monitoring of oral intake and if , if does not improve or declines may require RENAY.  3. Continue ASA 81 mg for now. Patient has poor nutrition intake and with decline in mental status, if does not improve may require RENAY.  4. Consider transition to Plavix monotherapy at discharge but would like to see how patient does with nutrition incase she may require PEG.  5. No atrial fibrillation detected on cardiac telemetry so far.  6. DM  Management for A1C goal less than 7.  7. Continue Aricept.  8. Poor prognosis of functional neurologic outcome given the diagnosis of underlying  dementia, likely Parkinson's Disease, and now with a dominant hemisphere stroke.  9. Patient having some movement of right lower extremity and at times appears to be purposeful but she may also be having spasms. If shows to have spasticity may consider BOTOX as outpatient. Would like to avoid antispasmodics as this can cause sedation in this patient that would prohibit her from participating in therapy.  10.  discharge back to [facility] today.  F/u with neurology in 4 weeks.           Kaci Bullock, ROMAIN  03/19/21  10:02 CDT

## 2021-03-19 NOTE — PROGRESS NOTES
Discharge Planning Assessment  Psychiatric     Patient Name: Val Gonzalez  MRN: [id number]  Today's Date: 3/19/2021    Admit Date: 3/12/2021    Discharge Needs Assessment    No documentation.       Discharge Plan     Row Name 03/19/21 1355       Plan    Final Discharge Disposition Code  03 - skilled nursing facility (SNF)    Final Note  Pt is being dcd back to [facility], skilled level. Pt will need a new Covid test prior to dc. Faxed DC summary and orders to [facility] at [phone]. RN can call report to [phone]        Continued Care and Services - Admitted Since 3/12/2021     Destination Coordination complete    Service Provider Request Status Selected Services Address Phone Fax Patient Preferred    [facility]   Selected Skilled Nursing [id number] [id number] [phone] --              Expected Discharge Date and Time     Expected Discharge Date Expected Discharge Time    Mar 19, 2021         Demographic Summary    No documentation.       Functional Status    No documentation.       Psychosocial    No documentation.       Abuse/Neglect    No documentation.       Legal    No documentation.       Substance Abuse    No documentation.       Patient Forms    No documentation.           BERT Rocha

## 2021-03-19 NOTE — DISCHARGE SUMMARY
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 These were unrevealing  ·  Hypertension - Patient is on losartan and metoprolol.  adjusted medication accordingly.    · Hyperlipidemia -LDL 73; continue statin  · Diabetes mellitus type - Her A1c level is 10.6.    Levemir increased; monitor for hypoglycemia  · altered mental status - CVA; At risk for FTT.   · Coag negative Staphylococcus bacteremia felt to be contaminant.  · Mild hypokalemia - resolved       Presenting Problem/History of Present Illness:  Cerebrovascular accident (CVA), unspecified mechanism (CMS/HCC) [I63.9]  Acute CVA (cerebrovascular accident) (CMS/[facility]) [I63.9]         Hospital Course  Patient is a 76 y.o. female   admitted for acute L MCA CVA.  She has known history of chronic old infarct of the occipital lobe.  Apparently on March 13 she had febrile illness with T-max of 101.5.  During that same time she also had severely elevated blood pressure as high as 210/147.  He was empirically started on antibiotic.  1 out of 4 blood culture bottle showed coagulase-negative Staphylococcus felt to be contaminant.     On March 16, she had fever of 101 and appeared more drowsy.  Repeat CT scan performed showed no other changes other than what is already known of the left MCA territory infarct without evidence of hemorrhagic transformation.  I  recultured her.  Urine and blood showed no growth to date.  I restarted her on antibiotic.  She has no gross evidence of current infection that warrants continued antibiotic.  She has not required further antipyretic in the past couple of days.  Her mentation has improved.    She is eating with assistance.  Yesterday she ate 75% of her breakfast and 50% of her lunch.  At this time it is not anticipated that she is needing any PEG.  Therefore, neurologist felt patient can be resumed on Plavix and continue on Aricept.  She remains at risk of FTT.  I recommend follow up BMP within 3 days at [facility].     Patient is felt to be at her best condition and medically appropriate for discharge.        Procedures Performed: None      Consults:  Dr. Angel/Dr. Penaloza      Pertinent Test Results:   Lab Results (last 7 days)     Procedure Component Value Units Date/Time    POC Glucose Once [[id number]]  (Abnormal) Collected: 03/19/21 0024    Specimen: Blood Updated: 03/19/21 0046     Glucose 289 mg/dL      Comment: : [id number] Jordan (Chris) AmandaMeter ID: [id number]       POC Glucose Once [[id number]]  (Abnormal) Collected: 03/18/21 2057    Specimen: Blood Updated: 03/18/21 2108     Glucose 381 mg/dL      Comment: : [id number] Enoch Carter AllieMeter ID: [id number]       POC Glucose Once [[id number]]  (Abnormal) Collected: 03/18/21 1618    Specimen: Blood Updated: 03/18/21 1640     Glucose 337 mg/dL      Comment: : [id number] Bandar NancyMeter ID: [id number]       Blood Culture - Blood, Hand, Left [[id number]] Collected: 03/16/21 1230    Specimen: Blood from Hand, Left Updated: 03/18/21 1301     Blood Culture No growth at 2 days    Blood Culture - Blood, Arm, Left [[id number]] Collected: 03/16/21 1230    Specimen: Blood from Arm, Left Updated: 03/18/21 1301     Blood Culture No growth at 2 days    POC Glucose Once [[id number]]  (Abnormal) Collected: 03/18/21 1208    Specimen: Blood Updated: 03/18/21 1219     Glucose  281 mg/dL      Comment: : [id number] Enoch Carter AllieMeter ID: [id number]       POC Glucose Once [[id number]]  (Abnormal) Collected: 03/17/21 2319    Specimen: Blood Updated: 03/18/21 0649     Glucose 228 mg/dL      Comment: : [id number] Ivy LatoshaMeter ID: [id number]       Extra Tubes [[id number]] Collected: 03/18/21 0500    Specimen: Blood, Venous Line Updated: 03/18/21 0615    Narrative:      The following orders were created for panel order Extra Tubes.  Procedure                               Abnormality         Status                     ---------                               -----------         ------                     Lavender Top[[id number]]                                     Final result                 Please view results for these tests on the individual orders.    Lavender Top [[id number]] Collected: 03/18/21 0500    Specimen: Blood Updated: 03/18/21 0615     Extra Tube hold for add-on     Comment: Auto resulted       POC Glucose Once [[id number]]  (Normal) Collected: 03/18/21 0549    Specimen: Blood Updated: 03/18/21 0600     Glucose 128 mg/dL      Comment: : [id number] Ivy LatoshaMeter ID: [id number]       Basic Metabolic Panel [[id number]]  (Abnormal) Collected: 03/18/21 0443    Specimen: Blood Updated: 03/18/21 0530     Glucose 157 mg/dL      BUN 23 mg/dL      Creatinine 1.39 mg/dL      Sodium 141 mmol/L      Potassium 3.5 mmol/L      Chloride 104 mmol/L      CO2 27.0 mmol/L      Calcium 8.7 mg/dL      eGFR Non African Amer 37 mL/min/1.73      BUN/Creatinine Ratio 16.5     Anion Gap 10.0 mmol/L     Narrative:      GFR Normal >60  Chronic Kidney Disease <60  Kidney Failure <15      POC Glucose Once [[id number]]  (Abnormal) Collected: 03/17/21 2043    Specimen: Blood Updated: 03/17/21 2104     Glucose 274 mg/dL      Comment: : [id number] Martha NicoleMeter ID: [id number]       POC Glucose Once [[id number]]  (Abnormal) Collected: 03/17/21 1653    Specimen: Blood Updated:  03/17/21 1706     Glucose 267 mg/dL      Comment: : [id number] Sophie AmandaMeter ID: [id number]       Blood Culture - Blood, Arm, Right [[id number]] Collected: 03/12/21 1434    Specimen: Blood from Arm, Right Updated: 03/17/21 1600     Blood Culture No growth at 5 days    Urine Culture - Urine, Urine, Clean Catch [[id number]]  (Normal) Collected: 03/16/21 1248    Specimen: Urine, Clean Catch Updated: 03/17/21 1555     Urine Culture No growth    POC Glucose Once [[id number]]  (Abnormal) Collected: 03/17/21 1234    Specimen: Blood Updated: 03/17/21 1246     Glucose 283 mg/dL      Comment: : [id number] Sophie AmandaMeter ID: [id number]       POC Glucose Once [[id number]]  (Abnormal) Collected: 03/17/21 0544    Specimen: Blood Updated: 03/17/21 0555     Glucose 200 mg/dL      Comment: : [id number] Darius Show de IngressosaMeter ID: [id number]       POC Glucose Once [[id number]]  (Abnormal) Collected: 03/17/21 0047    Specimen: Blood Updated: 03/17/21 0058     Glucose 238 mg/dL      Comment: : [id number] Darius LisaMeter ID: [id number]       POC Glucose Once [[id number]]  (Abnormal) Collected: 03/16/21 2142    Specimen: Blood Updated: 03/16/21 2203     Glucose 176 mg/dL      Comment: : [id number] Darius LisaMeter ID: [id number]       POC Glucose Once [[id number]]  (Abnormal) Collected: 03/16/21 1705    Specimen: Blood Updated: 03/16/21 1717     Glucose 186 mg/dL      Comment: : [id number] [facility] HospitalSkoovy ID: [id number]       Urinalysis With Culture If Indicated - Urine, Clean Catch [[id number]]  (Abnormal) Collected: 03/16/21 1248    Specimen: Urine, Clean Catch Updated: 03/16/21 1505     Color, UA Dark Yellow     Appearance, UA Clear     pH, UA 6.0     Specific Gravity, UA 1.013     Glucose, UA Negative     Ketones, UA Trace     Bilirubin, UA Small (1+)     Blood, UA Negative     Protein,  mg/dL (2+)     Leuk Esterase, UA Small (1+)     Nitrite, UA Negative     Urobilinogen, UA 4.0 E.U./dL     Urinalysis, Microscopic Only - Urine, Clean Catch [[id number]]  (Abnormal) Collected: 03/16/21 1248    Specimen: Urine, Clean Catch Updated: 03/16/21 1503     RBC, UA 3-5 /HPF      WBC, UA 6-12 /HPF      Bacteria, UA Trace /HPF      Squamous Epithelial Cells, UA 3-6 /HPF      Yeast, UA Large/3+ Budding Yeast /HPF      Methodology Manual Light Microscopy    Basic Metabolic Panel [[id number]]  (Abnormal) Collected: 03/16/21 1230    Specimen: Blood Updated: 03/16/21 1308     Glucose 232 mg/dL      BUN 12 mg/dL      Creatinine 0.84 mg/dL      Sodium 136 mmol/L      Potassium 3.2 mmol/L      Chloride 98 mmol/L      CO2 30.0 mmol/L      Calcium 8.8 mg/dL      eGFR Non African Amer 66 mL/min/1.73      BUN/Creatinine Ratio 14.3     Anion Gap 8.0 mmol/L     Narrative:      GFR Normal >60  Chronic Kidney Disease <60  Kidney Failure <15      CBC & Differential [[id number]]  (Abnormal) Collected: 03/16/21 1230    Specimen: Blood Updated: 03/16/21 1242    Narrative:      The following orders were created for panel order CBC & Differential.  Procedure                               Abnormality         Status                     ---------                               -----------         ------                     CBC Auto Differential[[id number]]        Abnormal            Final result                 Please view results for these tests on the individual orders.    CBC Auto Differential [[id number]]  (Abnormal) Collected: 03/16/21 1230    Specimen: Blood Updated: 03/16/21 1242     WBC 12.92 10*3/mm3      RBC 4.48 10*6/mm3      Hemoglobin 12.5 g/dL      Hematocrit 37.8 %      MCV 84.4 fL      MCH 27.9 pg      MCHC 33.1 g/dL      RDW 13.2 %      RDW-SD 40.4 fl      MPV 11.1 fL      Platelets 398 10*3/mm3      Neutrophil % 83.1 %      Lymphocyte % 9.3 %      Monocyte % 6.4 %      Eosinophil % 0.2 %      Basophil % 0.2 %      Immature Grans % 0.8 %      Neutrophils, Absolute 10.75 10*3/mm3      Lymphocytes, Absolute 1.20 10*3/mm3       Monocytes, Absolute 0.83 10*3/mm3      Eosinophils, Absolute 0.02 10*3/mm3      Basophils, Absolute 0.02 10*3/mm3      Immature Grans, Absolute 0.10 10*3/mm3      nRBC 0.0 /100 WBC     POC Glucose Once [[id number]]  (Abnormal) Collected: 03/16/21 1209    Specimen: Blood Updated: 03/16/21 1220     Glucose 180 mg/dL      Comment: : [id number] Lucille SRCH2sayMeter ID: [id number]       POC Glucose Once [[id number]]  (Abnormal) Collected: 03/16/21 0655    Specimen: Blood Updated: 03/16/21 0706     Glucose 177 mg/dL      Comment: : [id number] Darius LisaMeter ID: [id number]       POC Glucose Once [[id number]]  (Abnormal) Collected: 03/16/21 0049    Specimen: Blood Updated: 03/16/21 0102     Glucose 222 mg/dL      Comment: : [id number] Darius LisaMeter ID: [id number]       POC Glucose Once [[id number]]  (Abnormal) Collected: 03/15/21 2213    Specimen: Blood Updated: 03/15/21 2233     Glucose 239 mg/dL      Comment: : [id number] Darius LisaMeter ID: [id number]       POC Glucose Once [[id number]]  (Abnormal) Collected: 03/15/21 1648    Specimen: Blood Updated: 03/15/21 1700     Glucose 278 mg/dL      Comment: : [id number] Ezio ReederRetreat Doctors' Hospitaler ID: [id number]       POC Glucose Once [[id number]]  (Abnormal) Collected: 03/15/21 1108    Specimen: Blood Updated: 03/15/21 1120     Glucose 230 mg/dL      Comment: : [id number] Lucille SRCH2sayMeter ID: [id number]       Blood Culture - Blood, Arm, Left [[id number]]  (Abnormal) Collected: 03/12/21 1414    Specimen: Blood from Arm, Left Updated: 03/15/21 0738     Blood Culture Staphylococcus, coagulase negative     Comment: Probable contaminant requires clinical correlation, susceptibility not performed unless requested by physician.          Isolated from Anaerobic Bottle     Gram Stain Anaerobic Bottle Gram positive cocci    Narrative:      Probable contaminant requires clinical correlation, susceptibility not performed unless requested by physician.      Blood  Culture ID, PCR - Blood, Arm, Left [[id number]]  (Abnormal) Collected: 03/12/21 1414    Specimen: Blood from Arm, Left Updated: 03/15/21 0732     BCID, PCR Staphylococcus spp, not aureus. Identification by BCID PCR.     Comment: Corrected result. Previous result was Staphylococcus aureus. Identification by BCID PCR. on 3/13/2021 at 1227 CST.       Extra Tubes [[id number]] Collected: 03/15/21 0524    Specimen: Blood, Venous Line Updated: 03/15/21 0645    Narrative:      The following orders were created for panel order Extra Tubes.  Procedure                               Abnormality         Status                     ---------                               -----------         ------                     Lavender Top[[id number]]                                     Final result                 Please view results for these tests on the individual orders.    Lavender Top [[id number]] Collected: 03/15/21 0524    Specimen: Blood Updated: 03/15/21 0645     Extra Tube hold for add-on     Comment: Auto resulted       Basic Metabolic Panel [[id number]]  (Abnormal) Collected: 03/15/21 0524    Specimen: Blood Updated: 03/15/21 0612     Glucose 247 mg/dL      BUN 13 mg/dL      Creatinine 0.72 mg/dL      Sodium 137 mmol/L      Potassium 3.4 mmol/L      Chloride 99 mmol/L      CO2 25.0 mmol/L      Calcium 8.9 mg/dL      eGFR Non African Amer 79 mL/min/1.73      BUN/Creatinine Ratio 18.1     Anion Gap 13.0 mmol/L     Narrative:      GFR Normal >60  Chronic Kidney Disease <60  Kidney Failure <15      POC Glucose Once [[id number]]  (Abnormal) Collected: 03/15/21 0533    Specimen: Blood Updated: 03/15/21 0554     Glucose 204 mg/dL      Comment: : [id number] Ivy Bandgap EngineeringMeter ID: [id number]       POC Glucose Once [[id number]]  (Abnormal) Collected: 03/14/21 2123    Specimen: Blood Updated: 03/14/21 2136     Glucose 205 mg/dL      Comment: : [id number] Ivy Kalyra PharmaceuticalsahMeter ID: [id number]       POC Glucose Once [[id number]]  (Abnormal)  Collected: 03/14/21 1644    Specimen: Blood Updated: 03/14/21 1655     Glucose 155 mg/dL      Comment: : [id number] Korurmila CarsynMeter ID: [id number]       POC Glucose Once [[id number]]  (Abnormal) Collected: 03/14/21 1214    Specimen: Blood Updated: 03/14/21 1225     Glucose 201 mg/dL      Comment: : [id number] Korurmila CarsynMeter ID: [id number]       POC Glucose Once [[id number]]  (Abnormal) Collected: 03/14/21 0515    Specimen: Blood Updated: 03/14/21 0526     Glucose 170 mg/dL      Comment: : [id number] Veloz JosephMeter ID: [id number]       Basic Metabolic Panel [[id number]]  (Abnormal) Collected: 03/14/21 0132    Specimen: Blood Updated: 03/14/21 0425     Glucose 186 mg/dL      BUN 16 mg/dL      Creatinine 0.71 mg/dL      Sodium 138 mmol/L      Potassium 3.8 mmol/L      Comment: Slight hemolysis detected by analyzer. Results may be affected.        Chloride 102 mmol/L      CO2 27.0 mmol/L      Calcium 8.9 mg/dL      eGFR Non African Amer 80 mL/min/1.73      BUN/Creatinine Ratio 22.5     Anion Gap 9.0 mmol/L     Narrative:      GFR Normal >60  Chronic Kidney Disease <60  Kidney Failure <15      Phosphorus [[id number]]  (Normal) Collected: 03/14/21 0132    Specimen: Blood Updated: 03/14/21 0425     Phosphorus 2.9 mg/dL     Magnesium [[id number]]  (Normal) Collected: 03/14/21 0132    Specimen: Blood Updated: 03/14/21 0425     Magnesium 1.9 mg/dL     CBC & Differential [[id number]]  (Abnormal) Collected: 03/14/21 0132    Specimen: Blood Updated: 03/14/21 0334    Narrative:      The following orders were created for panel order CBC & Differential.  Procedure                               Abnormality         Status                     ---------                               -----------         ------                     CBC Auto Differential[[id number]]        Abnormal            Final result                 Please view results for these tests on the individual orders.    CBC Auto Differential [[id number]]   (Abnormal) Collected: 03/14/21 0132    Specimen: Blood Updated: 03/14/21 0334     WBC 9.86 10*3/mm3      RBC 4.46 10*6/mm3      Hemoglobin 12.2 g/dL      Hematocrit 38.5 %      MCV 86.3 fL      MCH 27.4 pg      MCHC 31.7 g/dL      RDW 13.2 %      RDW-SD 41.9 fl      MPV 11.3 fL      Platelets 382 10*3/mm3      Neutrophil % 75.7 %      Lymphocyte % 15.4 %      Monocyte % 5.9 %      Eosinophil % 1.7 %      Basophil % 0.4 %      Immature Grans % 0.9 %      Neutrophils, Absolute 7.46 10*3/mm3      Lymphocytes, Absolute 1.52 10*3/mm3      Monocytes, Absolute 0.58 10*3/mm3      Eosinophils, Absolute 0.17 10*3/mm3      Basophils, Absolute 0.04 10*3/mm3      Immature Grans, Absolute 0.09 10*3/mm3      nRBC 0.0 /100 WBC     POC Glucose Once [[id number]]  (Abnormal) Collected: 03/14/21 0000    Specimen: Blood Updated: 03/14/21 0014     Glucose 177 mg/dL      Comment: : [id number] Magdiel GonsalezMeter ID: [id number]       POC Glucose Once [[id number]]  (Abnormal) Collected: 03/13/21 1702    Specimen: Blood Updated: 03/13/21 1713     Glucose 261 mg/dL      Comment: : [id number] Sunny Mckenzie ID: [id number]       POC Glucose Once [[id number]]  (Abnormal) Collected: 03/13/21 0626    Specimen: Blood Updated: 03/13/21 0632     Glucose 249 mg/dL      Comment: : [id number] Jose ChungMeter ID: [id number]       Hemoglobin A1c [[id number]]  (Abnormal) Collected: 03/13/21 0205    Specimen: Blood Updated: 03/13/21 0309     Hemoglobin A1C 10.60 %     Narrative:      Hemoglobin A1C Ranges:    Increased Risk for Diabetes  5.7% to 6.4%  Diabetes                     >= 6.5%  Diabetic Goal                < 7.0%    Lipid Panel [[id number]]  (Abnormal) Collected: 03/13/21 0205    Specimen: Blood Updated: 03/13/21 0248     Total Cholesterol 114 mg/dL      Triglycerides 74 mg/dL      HDL Cholesterol 26 mg/dL      LDL Cholesterol  73 mg/dL      VLDL Cholesterol 15 mg/dL      LDL/HDL Ratio 2.82    Narrative:      Cholesterol Reference  Ranges  (U.S. Department of Health and Human Services ATP III Classifications)    Desirable          <200 mg/dL  Borderline High    200-239 mg/dL  High Risk          >240 mg/dL      Triglyceride Reference Ranges  (U.S. Department of Health and Human Services ATP III Classifications)    Normal           <150 mg/dL  Borderline High  150-199 mg/dL  High             200-499 mg/dL  Very High        >500 mg/dL    HDL Reference Ranges  (U.S. Department of Health and Human Services ATP III Classifcations)    Low     <40 mg/dl (major risk factor for CHD)  High    >60 mg/dl ('negative' risk factor for CHD)        LDL Reference Ranges  (U.S. Department of Health and Human Services ATP III Classifcations)    Optimal          <100 mg/dL  Near Optimal     100-129 mg/dL  Borderline High  130-159 mg/dL  High             160-189 mg/dL  Very High        >189 mg/dL    Comprehensive Metabolic Panel [[id number]]  (Abnormal) Collected: 03/13/21 0205    Specimen: Blood Updated: 03/13/21 0248     Glucose 341 mg/dL      BUN 26 mg/dL      Creatinine 0.91 mg/dL      Sodium 136 mmol/L      Potassium 4.5 mmol/L      Chloride 100 mmol/L      CO2 26.0 mmol/L      Calcium 8.6 mg/dL      Total Protein 6.2 g/dL      Albumin 2.60 g/dL      ALT (SGPT) 33 U/L      AST (SGOT) 22 U/L      Alkaline Phosphatase 92 U/L      Total Bilirubin 0.4 mg/dL      eGFR Non African Amer 60 mL/min/1.73      Globulin 3.6 gm/dL      A/G Ratio 0.7 g/dL      BUN/Creatinine Ratio 28.6     Anion Gap 10.0 mmol/L     Narrative:      GFR Normal >60  Chronic Kidney Disease <60  Kidney Failure <15      CBC (No Diff) [[id number]]  (Abnormal) Collected: 03/13/21 0205    Specimen: Blood Updated: 03/13/21 0221     WBC 10.01 10*3/mm3      RBC 4.13 10*6/mm3      Hemoglobin 11.5 g/dL      Hematocrit 35.5 %      MCV 86.0 fL      MCH 27.8 pg      MCHC 32.4 g/dL      RDW 13.2 %      RDW-SD 41.6 fl      MPV 11.1 fL      Platelets 311 10*3/mm3     POC Glucose Once [[id number]]  (Abnormal)  Collected: 03/13/21 0044    Specimen: Blood Updated: 03/13/21 0045     Glucose 316 mg/dL      Comment: : [id number] Jose ChungMeter ID: [id number]       POC Glucose Once [[id number]]  (Abnormal) Collected: 03/12/21 2238    Specimen: Blood Updated: 03/12/21 2240     Glucose 270 mg/dL      Comment: : [id number] Jose ChungMeter ID: [id number]       POC Glucose Once [[id number]]  (Abnormal) Collected: 03/12/21 2047    Specimen: Blood Updated: 03/12/21 2058     Glucose 382 mg/dL      Comment: : [id number] Bryson RutledgeMeter ID: [id number]       COVID PRE-OP / PRE-PROCEDURE SCREENING ORDER (NO ISOLATION) - Swab, Nasal Cavity [[id number]]  (Normal) Collected: 03/12/21 1638    Specimen: Swab from Nasal Cavity Updated: 03/12/21 1806    Narrative:      The following orders were created for panel order COVID PRE-OP / PRE-PROCEDURE SCREENING ORDER (NO ISOLATION) - Swab, Nasal Cavity.  Procedure                               Abnormality         Status                     ---------                               -----------         ------                     COVID-19,[facility] IN-RICHELLE...[[id number]]  Normal              Final result                 Please view results for these tests on the individual orders.    COVID-19,[facility] IN-HOUSE,Nasal Swab No Transport Media 3-4 HR TAT - Swab, Nasal Cavity [[id number]]  (Normal) Collected: 03/12/21 1638    Specimen: Swab from Nasal Cavity Updated: 03/12/21 1806     COVID19 Not Detected    Narrative:      Fact sheet for providers: https://www.fda.gov/media/424204/download     Fact sheet for patients: https://www.fda.gov/media/844269/download    Test performed by PCR.    Consider negative results in combination with clinical observations, patient history, and epidemiological information.    Comprehensive Metabolic Panel [[id number]]  (Abnormal) Collected: 03/12/21 1410    Specimen: Blood Updated: 03/12/21 1505     Glucose 335 mg/dL      BUN 29 mg/dL      Creatinine 1.06 mg/dL       Sodium 137 mmol/L      Potassium 5.2 mmol/L      Chloride 100 mmol/L      CO2 28.0 mmol/L      Calcium 9.2 mg/dL      Total Protein 6.9 g/dL      Albumin 2.90 g/dL      ALT (SGPT) 39 U/L      AST (SGOT) 33 U/L      Alkaline Phosphatase 107 U/L      Total Bilirubin 0.4 mg/dL      eGFR Non African Amer 50 mL/min/1.73      Globulin 4.0 gm/dL      A/G Ratio 0.7 g/dL      BUN/Creatinine Ratio 27.4     Anion Gap 9.0 mmol/L     Narrative:      GFR Normal >60  Chronic Kidney Disease <60  Kidney Failure <15      Lactic Acid, Plasma [[id number]]  (Normal) Collected: 03/12/21 1432    Specimen: Blood Updated: 03/12/21 1501     Lactate 2.0 mmol/L     Magnesium [[id number]]  (Normal) Collected: 03/12/21 1410    Specimen: Blood Updated: 03/12/21 1500     Magnesium 2.2 mg/dL     aPTT [[id number]]  (Abnormal) Collected: 03/12/21 1410    Specimen: Blood Updated: 03/12/21 1455     PTT 39.4 seconds     Protime-INR [[id number]]  (Normal) Collected: 03/12/21 1410    Specimen: Blood Updated: 03/12/21 1455     Protime 13.4 Seconds      INR 1.06    Urinalysis With Culture If Indicated - Urine, Catheter In/Out [[id number]]  (Abnormal) Collected: 03/12/21 1433    Specimen: Urine, Catheter In/Out Updated: 03/12/21 1447     Color, UA Dark Yellow     Appearance, UA Clear     pH, UA 5.5     Specific Gravity, UA 1.024     Glucose,  mg/dL (1+)     Ketones, UA Negative     Bilirubin, UA Negative     Blood, UA Negative     Protein, UA 30 mg/dL (1+)     Leuk Esterase, UA Trace     Nitrite, UA Negative     Urobilinogen, UA 1.0 E.U./dL    Urinalysis, Microscopic Only - Urine, Catheter In/Out [[id number]]  (Abnormal) Collected: 03/12/21 1433    Specimen: Urine, Catheter In/Out Updated: 03/12/21 1447     RBC, UA 3-5 /HPF      WBC, UA 3-5 /HPF      Bacteria, UA None Seen /HPF      Squamous Epithelial Cells, UA 0-2 /HPF      Hyaline Casts, UA 3-6 /LPF      Methodology Automated Microscopy    CBC & Differential [[id number]]  (Abnormal) Collected: 03/12/21  1410    Specimen: Blood Updated: 03/12/21 1445    Narrative:      The following orders were created for panel order CBC & Differential.  Procedure                               Abnormality         Status                     ---------                               -----------         ------                     CBC Auto Differential[[id number]]        Abnormal            Final result                 Please view results for these tests on the individual orders.    CBC Auto Differential [[id number]]  (Abnormal) Collected: 03/12/21 1410    Specimen: Blood Updated: 03/12/21 1445     WBC 11.73 10*3/mm3      RBC 4.62 10*6/mm3      Hemoglobin 12.9 g/dL      Hematocrit 40.6 %      MCV 87.9 fL      MCH 27.9 pg      MCHC 31.8 g/dL      RDW 13.5 %      RDW-SD 43.8 fl      MPV 11.4 fL      Platelets 349 10*3/mm3      Neutrophil % 79.6 %      Lymphocyte % 11.3 %      Monocyte % 6.0 %      Eosinophil % 1.0 %      Basophil % 0.4 %      Immature Grans % 1.7 %      Neutrophils, Absolute 9.33 10*3/mm3      Lymphocytes, Absolute 1.33 10*3/mm3      Monocytes, Absolute 0.70 10*3/mm3      Eosinophils, Absolute 0.12 10*3/mm3      Basophils, Absolute 0.05 10*3/mm3      Immature Grans, Absolute 0.20 10*3/mm3      nRBC 0.0 /100 WBC         Imaging Results (Last 7 Days)     Procedure Component Value Units Date/Time    XR Chest 1 View [[id number]] Collected: 03/16/21 1350     Updated: 03/16/21 1354    Narrative:      EXAM: XR CHEST 1 VW- 3/16/2021 1:47 PM CDT     HISTORY: fever, rule out aspiration; I63.9-Cerebral infarction,  unspecified; R13.12-Dysphagia, oropharyngeal phase; Z74.09-Other reduced  mobility; Z78.9-Other specified health status       COMPARISON: March 12, 2020.     TECHNIQUE: Frontal radiograph of the chest     FINDINGS:   The lungs are clear. Cardiac silhouette is normal. Vascular  calcifications present in the aortic arch..      The osseous structures and surrounding soft tissues demonstrate no acute  abnormality. Left shoulder  prosthesis is unchanged. Cardiac monitoring  leads are present.          Impression:      1. No radiographic evidence of acute cardiopulmonary process.        This report was finalized on 03/16/2021 13:51 by Dr. Turner Jose MD.    CT Head Without Contrast [[id number]] Collected: 03/16/21 1121     Updated: 03/16/21 1127    Narrative:      EXAMINATION: CT HEAD WO CONTRAST- 3/16/2021 11:21 AM CDT     HISTORY: change in mental status; I63.9-Cerebral infarction,  unspecified; R13.12-Dysphagia, oropharyngeal phase; Z74.09-Other reduced  mobility; Z78.9-Other specified health status.     DOSE: 2346 mGycm (Automatic exposure control technique was implemented  in an effort to keep the radiation dose as low as possible without  compromising image quality)     REPORT: Spiral CT of the head was performed without contrast,  reconstructed coronal and sagittal images were also obtained.     COMPARISON: CT head without contrast 3/12/2021.     No intracranial hemorrhage is identified, there is a moderate-sized area  of decreased attenuation in the distribution of the left MCA, which is  more conspicuous and compatible with edema and acute infarction. There  is no midline shift. Mild diffuse atrophy is present. There is mild  ventriculomegaly which correlates with a degree of atrophy. Decreased  attenuation in the periventricular white matter tracts is compatible  with moderate chronic small vessel white matter ischemic disease. Review  of bone windows is unremarkable.       Impression:      Increased conspicuity of a moderate-sized left MCA territory  infarction with no evidence of hemorrhagic transformation.     This report was finalized on 03/16/2021 11:24 by Dr. Nick Corey MD.    US Carotid Bilateral [[id number]] Collected: 03/15/21 1413     Updated: 03/15/21 1417    Narrative:      History: CVA       Impression:      Impression:  1. There is less than 50% stenosis of the right internal carotid artery.  2. There is less  than 50% stenosis of the left internal carotid artery.  3. Antegrade flow is demonstrated in bilateral vertebral arteries.     Comments: Bilateral carotid vertebral arterial duplex scan was  performed.     Grayscale imaging shows intimal thickening and calcified elements at the  carotid bifurcation. The right internal carotid artery peak systolic  velocity is 111 cm/sec. The end-diastolic velocity is 22 cm/sec. The  right ICA/CCA ratio is approximately 1.0 . These findings correlate with  less than 50% stenosis of the right internal carotid artery.     Grayscale imaging shows intimal thickening and calcified elements at the  carotid bifurcation. The left internal carotid artery peak systolic  velocity is 95 cm/sec. The end-diastolic velocity is 27.8 cm/sec. The  left ICA/CCA ratio is approximately 0.6 . These findings correlate with  less than 50% stenosis of the left internal carotid artery.     Antegrade flow is demonstrated in bilateral vertebral arteries.  There is greater than 50% stenosis in the left common carotid artery and  bilateral external carotid arteries.  This report was finalized on 03/15/2021 14:14 by Dr. Jack Lorenzana MD.    CT Head Without Contrast [[id number]] Collected: 03/12/21 1858     Updated: 03/12/21 1903    Narrative:      EXAMINATION:  CT HEAD WO CONTRAST-  3/12/2021 6:39 PM CST     HISTORY: Stroke. Change in mental status.     TECHNIQUE: Multiple axial images were obtained through the brain without  contrast infusion. Multiplanar images were reconstructed.     DLP: 939 mGy-cm. Automated dosage control was utilized.     COMPARISON: 3/12/2021 3:07 PM.     FINDINGS: There are areas of low density in the left frontal-parietal  region similar to abnormality seen on brain MRI today which is  performed. The finding is consistent with acute infarct. There is no  hemorrhage. There is no shifting of the midline structures. There is  minimal sulcal effacement in the left frontal-parietal region.  Low  density in the hemispheric white matter is otherwise nonspecific. There  is moderate atrophy with associated ventricular prominence. The  visualized paranasal sinuses and mastoid air cells are clear. The  current study is degraded by motion artifact.       Impression:      1. Acute left hemispheric infarct seen better on the recent MRI study.  2. Atrophy and chronic small vessel disease.  3. No evidence of acute hemorrhage.     This report was finalized on 03/12/2021 19:00 by Dr. Shaq Kumar MD.    MRI Brain Without Contrast [[id number]] Collected: 03/12/21 1837     Updated: 03/12/21 1846    Narrative:      EXAMINATION:  MRI BRAIN WO CONTRAST-  3/12/2021 6:12 PM CST     HISTORY: Neuro deficit, acute, stroke suspected; I63.9-Cerebral  infarction, unspecified     TECHNIQUE: Multiplanar imaging was performed in a high field magnet.     COMPARISON: No comparison study.     FINDINGS: There are multifocal acute infarcts in the left hemisphere  predominantly in left MCA distribution. There is high signal on the  diffusion sequence in the right occipital lobe without definite  diffusion restriction on the ADC map images. With fluid signal on T2  images consistent with T2 shine through artifact. On the gradient echo  sequence, there is an area of susceptibility artifact in the upper  portion of the right sylvian fissure. There are few areas of  susceptibility artifact in sulci in the left frontal-parietal region.  There is moderate to severe diffuse atrophy with associated ventricular  prominence. There is T2 high signal in the hemispheric white matter and  the varun that is nonspecific and likely due to chronic small vessel  disease.       Impression:      1. Multifocal areas of acute infarct in the left frontal-parietal region  predominantly in a left MCA distribution.  2. Chronic infarct in the right occipital lobe.  3. Areas of T2 high signal in the hemispheric white matter and varun  without diffusion signal  abnormality most likely due to chronic small  vessel disease.  4. Moderate atrophy with associated ventricular prominence.     Results called to the emergency room at 6:42 PM.        This report was finalized on 03/12/2021 18:43 by Dr. Shaq Kumar MD.    XR Abdomen KUB [[id number]] Collected: 03/12/21 1725     Updated: 03/12/21 1729    Narrative:      EXAMINATION:  XR ABDOMEN KUB-  3/12/2021 5:13 PM CST     HISTORY: MRI; I63.9-Cerebral infarction, unspecified.     COMPARISON: No comparison study.     TECHNIQUE: Supine abdomen.      FINDINGS:   There has been prior posterior instrumented fusion in the  lumbar region. There is scoliosis and degenerative change of the spine.  There has been prior left hip arthroplasty. The bowel gas pattern is  normal. There is no organomegaly or mass effect.       Impression:      No acute findings.        This report was finalized on 03/12/2021 17:26 by Dr. Shaq Kumar MD.    CT Head Without Contrast [[id number]] Collected: 03/12/21 1538     Updated: 03/12/21 1548    Narrative:      Exam: CT HEAD WO CONTRAST-     Indication: Neuro deficit, acute, stroke suspected     Comparison: None     Dose length product: 1887 mGy cm. Automated exposure control was also  utilized to decrease patient radiation dose.     Findings:     No evidence of intracranial hemorrhage. Gray-white differentiation is  maintained. Chronic microvascular ischemic white matter change. Old  lacunar infarct in the LEFT caudate. Focal hypodensity in the  periventricular and subcortical LEFT frontal white matter on image 22  also favored to be related to old chronic microvascular ischemia. No  midline shift or mass effect. Lateral ventricles are nondilated. Basilar  cisterns are patent. Global cerebral volume loss is noted. Vascular  calcification. No acute orbital finding. Bilateral small mastoid  effusions. Paranasal sinuses are clear. No acute osseous findings.       Impression:      Impression:     1.  No acute  intracranial findings.  2.  Chronic microvascular ischemic white matter change and old appearing  LEFT caudate lacunar infarct.  3.  Recommend brain MRI if there is clinical concern for acute ischemia.  4.  Small mastoid effusions.  This report was finalized on 03/12/2021 15:45 by Dr. Rogerio Jaimes MD.    XR Forearm 2 View Left [[id number]] Collected: 03/12/21 1544     Updated: 03/12/21 1548    Narrative:      Left forearm, 2 views 3/12/2021 3:19 PM CST     HISTORY: pain     COMPARISON: NONE     FINDINGS:  Frontal and lateral radiographs of the left forearm were obtained.     There is no evidence of fracture. First carpometacarpal, triscaphe and  radiocarpal joint osteoarthritis changes. Additional osteoarthritis  changes at the elbow joint. Nonspecific soft tissue swelling along the  dorsal aspect of the forearm, especially along the more proximal aspect.       Impression:      1. Nonspecific soft tissue swelling along the more proximal aspect of  the dorsal forearm. No underlying bony abnormality.  2. 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     This report was finalized on 03/12/2021 15:45 by Dr Vinod Conner, .    XR Humerus Left [[id number]] Collected: 03/12/21 1542     Updated: 03/12/21 1547    Narrative:      Left humerus, 2 views 3/12/2021 3:19 PM CST     HISTORY: pain     COMPARISON: NONE     FINDINGS:  Frontal and lateral radiographs of the left humerus were obtained.     Left shoulder arthroplasty. No periprosthetic fracture. In general, no  fractures seen. 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 No gross  soft tissue abnormality.       Impression:      1. Left shoulder arthroplasty. No hardware complication. No acute  fracture.  2. 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     This report was finalized on 03/12/2021 15:44 by Dr Vinod Conner, .    XR Chest 1 View [[id number]] Collected: 03/12/21 1539     Updated: 03/12/21 1542    Narrative:      Frontal upright radiograph of the chest  "3/12/2021 3:19 PM CST     HISTORY: Weakness     COMPARISON: None.     FINDINGS:      No lung consolidation. No pleural effusion or pneumothorax. The  cardiomediastinal silhouette and pulmonary vascularity are within normal  limits. Left shoulder arthroplasty. No acute osseous injury.       Impression:      1. No radiographic evidence of acute cardiopulmonary process.        This report was finalized on 03/12/2021 15:39 by Dr Vinod Conner, .        Condition on Discharge: Stable  Physical Exam on Discharge:  /94 (BP Location: Left arm, Patient Position: Lying)   Pulse 81   Temp 97.7 °F (36.5 °C) (Axillary)   Resp 18   Ht 172.7 cm (67.99\")   Wt 94.5 kg (208 lb 6.4 oz)   SpO2 96%   Breastfeeding No   BMI 31.69 kg/m²   Physical Exam   She had  980ml PO input from yesterday and 880ml  PO  input the other day.  She is unable to tell if she is needing to go to the bathroom.  She is wearing diaper and nurse reports it's been wet.    She isawake and alert.  She is not able to follow commands.  She is nonverbal and noninteractive to me.  Her eyes tracks my position   She is calm   Normocephalic and atraumatic head  Diminished breath sounds without any adventitious sounds  S1-S2, regular rate and rhythm without crackles.    Soft abdomen nontender, no hypogastric fullness  No gross edema  SCD in place   skin on her back looks great w/o any gross breakdown  No significant change from yesterday  Discharge Disposition:      Discharge Medications:     Discharge Medications      New Medications      Instructions Start Date   atorvastatin 40 MG tablet  Commonly known as: LIPITOR   40 mg, Oral, Nightly      carvedilol 12.5 MG tablet  Commonly known as: COREG   12.5 mg, Oral, 2 Times Daily With Meals      clopidogrel 75 MG tablet  Commonly known as: PLAVIX   75 mg, Oral, Daily         Changes to Medications      Instructions Start Date   insulin detemir 100 UNIT/ML injection  Commonly known as: LEVEMIR  What changed: "   · how much to take  · when to take this   40 Units, Subcutaneous, Nightly         Continue These Medications      Instructions Start Date   donepezil 5 MG tablet  Commonly known as: ARICEPT   5 mg, Oral, Nightly      insulin lispro 100 UNIT/ML injection  Commonly known as: humaLOG   Inject under the skin TID AC + QHS as per sliding scale: 151-200= 4 units 201-250= 6 units 251-300= 8 units 301-350= 12 units 351-400= 16 units >400= 20 units and call MD      levothyroxine 75 MCG tablet  Commonly known as: SYNTHROID, LEVOTHROID   75 mcg, Oral, Daily      losartan 50 MG tablet  Commonly known as: COZAAR   100 mg, Oral, Daily      pantoprazole 40 MG EC tablet  Commonly known as: PROTONIX   40 mg, Oral, Daily      sucralfate 1 g tablet  Commonly known as: CARAFATE   1 g, Oral, 4 Times Daily         Stop These Medications    aspirin 81 MG chewable tablet     cephalexin 500 MG capsule  Commonly known as: KEFLEX     lovastatin 40 MG tablet  Commonly known as: MEVACOR     metoprolol tartrate 25 MG tablet  Commonly known as: LOPRESSOR     oxyCODONE 5 MG capsule  Commonly known as: OXY-IR     potassium chloride 20 MEQ CR tablet  Commonly known as: K-DUR,KLOR-CON     traZODone 50 MG tablet  Commonly known as: DESYREL            Discharge Diet:   Diet Instructions     Diet: Dysphagia; Nectar / Syrup Thick Liquids; Pureed      Discharge Diet: Dysphagia    Fluid Consistency: Nectar / Syrup Thick Liquids    Pureed Options: Pureed              Activity at Discharge:   Activity Instructions     Gradually Increase Activity Until at Pre-Hospitalization Level            Follow-up Appointments:   Skilled nursing facility within 24 to 48 hours; recommend monitoring blood pressure with recent increase in carvedilol; monitor for hypoglycemia with recent increase in Levemir; recommend BMP within 1 week.  Patient remains at risk for failure to thrive causing dehydration    Neurology within 4 weeks.  Test Results Pending at Discharge:    Pending Labs     Order Current Status    Blood Culture - Blood, Arm, Left Preliminary result    Blood Culture - Blood, Hand, Left Preliminary result           Electronically signed by Abdullahi Han MD, 3/19/2021, 09:53 CDT.    Time:> 30 mins      Part of this note may be an electronic transcription/translation of spoken language to printed text using the Dragon Dictation System.

## 2021-03-21 LAB
BACTERIA SPEC AEROBE CULT: NORMAL
BACTERIA SPEC AEROBE CULT: NORMAL

## 2021-03-21 NOTE — THERAPY DISCHARGE NOTE
Acute Care - Speech Language Pathology Discharge Summary  [facility]       Patient Name: Val Gonzalez  : 1945  MRN: [id number]    Today's Date: 3/21/2021                   Admit Date: 3/12/2021      SLP Recommendation and Plan  Pureed diet consistency with nectar thick liquid consistency. Pt would benefit from skilled SLP services at the next level of care. Thanks!   Vilma Turner, CCC-SLP 3/21/2021 13:33 CDT    Visit Dx:    ICD-10-CM ICD-9-CM   1. Cerebrovascular accident (CVA), unspecified mechanism (CMS/[facility])  I63.9 434.91   2. Oropharyngeal dysphagia  R13.12 787.22   3. Impaired mobility and ADLs  Z74.09 V49.89    Z78.9                SLP GOALS     Row Name 21 1331             Oral Nutrition/Hydration Goal 1 (SLP)    Oral Nutrition/Hydration Goal 1, SLP  LTG: Patient will tolerate LRD with no overt s/s of aspiration  -TM      Time Frame (Oral Nutrition/Hydration Goal 1, SLP)  short term goal (STG);by discharge  -TM      Barriers (Oral Nutrition/Hydration Goal 1, SLP)  pre-existing deficits  -TM      Progress/Outcomes (Oral Nutrition/Hydration Goal 1, SLP)  goal not met;discharged from facility  -TM         Labial Strengthening Goal 1 (SLP)    Activity (Labial Strengthening Goal 1, SLP)  increase labial tone  -TM      Increase Labial Tone  labial resistance exercises  -TM      [place]/Accuracy (Labial Strengthening Goal 1, SLP)  independently (over 90% accuracy)  -TM      Time Frame (Labial Strengthening Goal 1, SLP)  short term goal (STG);by discharge  -TM      Barriers (Labial Strengthening Goal 1, SLP)  pre-existing deficits  -TM      Progress/Outcomes (Labial Strengthening Goal 1, SLP)  goal not met;discharged from facility  -TM         Lingual Strengthening Goal 1 (SLP)    Activity (Lingual Strengthening Goal 1, SLP)  increase lingual tone/sensation/control/coordination/movement  -TM      Increase Lingual Tone/Sensation/Control/Coordination/Movement  lingual movement exercises  -TM       [place]/Accuracy (Lingual Strengthening Goal 1, SLP)  independently (over 90% accuracy)  -TM      Time Frame (Lingual Strengthening Goal 1, SLP)  short term goal (STG);by discharge  -TM      Barriers (Lingual Strengthening Goal 1, SLP)  pre-existing deficits  -TM      Progress/Outcomes (Lingual Strengthening Goal 1, SLP)  goal not met;discharged from facility  -TM         Pharyngeal Strengthening Exercise Goal 1 (SLP)    Activity (Pharyngeal Strengthening Goal 1, SLP)  increase timing  -TM      Increase Timing  gustatory stimulation (sour/cold)  -TM      [place]/Accuracy (Pharyngeal Strengthening Goal 1, SLP)  independently (over 90% accuracy)  -TM      Time Frame (Pharyngeal Strengthening Goal 1, SLP)  short term goal (STG);by discharge  -TM      Barriers (Pharyngeal Strengthening Goal 1, SLP)  pre-existing deficits  -TM      Progress/Outcomes (Pharyngeal Strengthening Goal 1, SLP)  goal not met;discharged from facility  -TM        User Key  (r) = Recorded By, (t) = Taken By, (c) = Cosigned By    Initials Name Provider Type    TM Vilma Turner CCC-SLP Speech and Language Pathologist                  SLP Discharge Summary  Anticipated Discharge Disposition (SLP): skilled nursing facility  Reason for Discharge: discharge from this facility  Progress Toward Achieving Short/long Term Goals: goals not met within established timelines  Discharge Destination: SNF      Vilma Turner CCC-SLP  3/21/2021

## 2021-03-22 ENCOUNTER — APPOINTMENT (OUTPATIENT)
Dept: GENERAL RADIOLOGY | Facility: HOSPITAL | Age: 76
End: 2021-03-22

## 2021-03-22 ENCOUNTER — HOSPITAL ENCOUNTER (EMERGENCY)
Facility: HOSPITAL | Age: 76
Discharge: NURSING, REHAB OR LONG-TERM CARE | End: 2021-03-22
Admitting: EMERGENCY MEDICINE

## 2021-03-22 ENCOUNTER — APPOINTMENT (OUTPATIENT)
Dept: CT IMAGING | Facility: HOSPITAL | Age: 76
End: 2021-03-22

## 2021-03-22 VITALS
BODY MASS INDEX: 41.83 KG/M2 | HEART RATE: 79 BPM | OXYGEN SATURATION: 96 % | HEIGHT: 68 IN | WEIGHT: 276 LBS | TEMPERATURE: 98 F | DIASTOLIC BLOOD PRESSURE: 88 MMHG | SYSTOLIC BLOOD PRESSURE: 175 MMHG | RESPIRATION RATE: 17 BRPM

## 2021-03-22 DIAGNOSIS — R41.82 ALTERED MENTAL STATUS, UNSPECIFIED ALTERED MENTAL STATUS TYPE: ICD-10-CM

## 2021-03-22 DIAGNOSIS — I63.9 CEREBROVASCULAR ACCIDENT (CVA), UNSPECIFIED MECHANISM (HCC): Primary | ICD-10-CM

## 2021-03-22 LAB
ALBUMIN SERPL-MCNC: 3.1 G/DL (ref 3.5–5.2)
ALBUMIN/GLOB SERPL: 0.9 G/DL
ALP SERPL-CCNC: 84 U/L (ref 39–117)
ALT SERPL W P-5'-P-CCNC: 12 U/L (ref 1–33)
ANION GAP SERPL CALCULATED.3IONS-SCNC: 10 MMOL/L (ref 5–15)
APTT PPP: 33.8 SECONDS (ref 24.1–35)
AST SERPL-CCNC: 14 U/L (ref 1–32)
BACTERIA UR QL AUTO: ABNORMAL /HPF
BASOPHILS # BLD AUTO: 0.04 10*3/MM3 (ref 0–0.2)
BASOPHILS NFR BLD AUTO: 0.4 % (ref 0–1.5)
BILIRUB SERPL-MCNC: 0.4 MG/DL (ref 0–1.2)
BILIRUB UR QL STRIP: NEGATIVE
BUN SERPL-MCNC: 31 MG/DL (ref 8–23)
BUN/CREAT SERPL: 16.8 (ref 7–25)
CALCIUM SPEC-SCNC: 8.9 MG/DL (ref 8.6–10.5)
CHLORIDE SERPL-SCNC: 104 MMOL/L (ref 98–107)
CLARITY UR: ABNORMAL
CO2 SERPL-SCNC: 30 MMOL/L (ref 22–29)
COARSE GRAN CASTS URNS QL MICRO: ABNORMAL /LPF
COLOR UR: ABNORMAL
CREAT SERPL-MCNC: 1.84 MG/DL (ref 0.57–1)
DEPRECATED RDW RBC AUTO: 43.9 FL (ref 37–54)
EOSINOPHIL # BLD AUTO: 0.1 10*3/MM3 (ref 0–0.4)
EOSINOPHIL NFR BLD AUTO: 1 % (ref 0.3–6.2)
ERYTHROCYTE [DISTWIDTH] IN BLOOD BY AUTOMATED COUNT: 13.7 % (ref 12.3–15.4)
GFR SERPL CREATININE-BSD FRML MDRD: 27 ML/MIN/1.73
GLOBULIN UR ELPH-MCNC: 3.5 GM/DL
GLUCOSE BLDC GLUCOMTR-MCNC: 225 MG/DL (ref 70–130)
GLUCOSE BLDC GLUCOMTR-MCNC: 265 MG/DL (ref 70–130)
GLUCOSE SERPL-MCNC: 367 MG/DL (ref 65–99)
GLUCOSE UR STRIP-MCNC: NEGATIVE MG/DL
HCT VFR BLD AUTO: 43.2 % (ref 34–46.6)
HGB BLD-MCNC: 13.6 G/DL (ref 12–15.9)
HGB UR QL STRIP.AUTO: NEGATIVE
HYALINE CASTS UR QL AUTO: ABNORMAL /LPF
IMM GRANULOCYTES # BLD AUTO: 0.03 10*3/MM3 (ref 0–0.05)
IMM GRANULOCYTES NFR BLD AUTO: 0.3 % (ref 0–0.5)
INR PPP: 1.16 (ref 0.91–1.09)
KETONES UR QL STRIP: ABNORMAL
LEUKOCYTE ESTERASE UR QL STRIP.AUTO: ABNORMAL
LYMPHOCYTES # BLD AUTO: 2.35 10*3/MM3 (ref 0.7–3.1)
LYMPHOCYTES NFR BLD AUTO: 24.6 % (ref 19.6–45.3)
MCH RBC QN AUTO: 27.4 PG (ref 26.6–33)
MCHC RBC AUTO-ENTMCNC: 31.5 G/DL (ref 31.5–35.7)
MCV RBC AUTO: 86.9 FL (ref 79–97)
MONOCYTES # BLD AUTO: 0.83 10*3/MM3 (ref 0.1–0.9)
MONOCYTES NFR BLD AUTO: 8.7 % (ref 5–12)
NEUTROPHILS NFR BLD AUTO: 6.21 10*3/MM3 (ref 1.7–7)
NEUTROPHILS NFR BLD AUTO: 65 % (ref 42.7–76)
NITRITE UR QL STRIP: NEGATIVE
NRBC BLD AUTO-RTO: 0 /100 WBC (ref 0–0.2)
PH UR STRIP.AUTO: 5.5 [PH] (ref 5–8)
PLATELET # BLD AUTO: 365 10*3/MM3 (ref 140–450)
PMV BLD AUTO: 10.7 FL (ref 6–12)
POTASSIUM SERPL-SCNC: 3.5 MMOL/L (ref 3.5–5.2)
PROT SERPL-MCNC: 6.6 G/DL (ref 6–8.5)
PROT UR QL STRIP: ABNORMAL
PROTHROMBIN TIME: 14.4 SECONDS (ref 11.9–14.6)
RBC # BLD AUTO: 4.97 10*6/MM3 (ref 3.77–5.28)
RBC # UR: ABNORMAL /HPF
REF LAB TEST METHOD: ABNORMAL
RENAL EPI CELLS #/AREA URNS HPF: ABNORMAL /HPF
SODIUM SERPL-SCNC: 144 MMOL/L (ref 136–145)
SP GR UR STRIP: 1.02 (ref 1–1.03)
SQUAMOUS #/AREA URNS HPF: ABNORMAL /HPF
TRANS CELLS #/AREA URNS HPF: ABNORMAL /HPF
UROBILINOGEN UR QL STRIP: ABNORMAL
WBC # BLD AUTO: 9.56 10*3/MM3 (ref 3.4–10.8)
WBC UR QL AUTO: ABNORMAL /HPF
YEAST URNS QL MICRO: ABNORMAL /HPF

## 2021-03-22 PROCEDURE — 80053 COMPREHEN METABOLIC PANEL: CPT | Performed by: NURSE PRACTITIONER

## 2021-03-22 PROCEDURE — P9612 CATHETERIZE FOR URINE SPEC: HCPCS

## 2021-03-22 PROCEDURE — 87086 URINE CULTURE/COLONY COUNT: CPT | Performed by: NURSE PRACTITIONER

## 2021-03-22 PROCEDURE — 85730 THROMBOPLASTIN TIME PARTIAL: CPT | Performed by: NURSE PRACTITIONER

## 2021-03-22 PROCEDURE — 81001 URINALYSIS AUTO W/SCOPE: CPT | Performed by: NURSE PRACTITIONER

## 2021-03-22 PROCEDURE — 71045 X-RAY EXAM CHEST 1 VIEW: CPT

## 2021-03-22 PROCEDURE — 93005 ELECTROCARDIOGRAM TRACING: CPT | Performed by: NURSE PRACTITIONER

## 2021-03-22 PROCEDURE — 93010 ELECTROCARDIOGRAM REPORT: CPT | Performed by: INTERNAL MEDICINE

## 2021-03-22 PROCEDURE — 70450 CT HEAD/BRAIN W/O DYE: CPT

## 2021-03-22 PROCEDURE — 99285 EMERGENCY DEPT VISIT HI MDM: CPT

## 2021-03-22 PROCEDURE — 85610 PROTHROMBIN TIME: CPT | Performed by: NURSE PRACTITIONER

## 2021-03-22 PROCEDURE — 85025 COMPLETE CBC W/AUTO DIFF WBC: CPT | Performed by: NURSE PRACTITIONER

## 2021-03-22 RX ORDER — SODIUM CHLORIDE 0.9 % (FLUSH) 0.9 %
10 SYRINGE (ML) INJECTION AS NEEDED
Status: DISCONTINUED | OUTPATIENT
Start: 2021-03-22 | End: 2021-03-23 | Stop reason: HOSPADM

## 2021-03-22 NOTE — ED PROVIDER NOTES
Subjective   76 yof presents via EMS from [facility].   She was an inpatient here from 03/12 to 03/19/21 for a CVA. She was discharged to the nursing home on 03/19/2021.  The staff at the nursing home states she is less responsive and is open mouth breathing.  They believe she is declining but are unsure as she is a new resident.   She has right sided residual from the recent CVA.  She will open eyes to verbal stimuli.  She is unable to answer as she has been aphasic since her CVA.  Her presents v/s are WNL.  Her sp02 is 96%.            Review of Systems   Constitutional: Negative for activity change, appetite change, fatigue and fever.   HENT: Negative for congestion, ear pain, facial swelling and sore throat.    Eyes: Negative for discharge and visual disturbance.   Respiratory: Negative for apnea, chest tightness, shortness of breath, wheezing and stridor.    Cardiovascular: Negative for chest pain and palpitations.   Gastrointestinal: Negative for abdominal distention, abdominal pain, diarrhea, nausea and vomiting.   Genitourinary: Negative for difficulty urinating and dysuria.   Musculoskeletal: Negative for arthralgias and myalgias.   Skin: Negative for rash and wound.   Neurological: Positive for speech difficulty and weakness. Negative for dizziness and seizures.   Psychiatric/Behavioral: Negative for agitation.       Past Medical History:   Diagnosis Date   • Altered mental status    • Alzheimer's disease (CMS/HCC)    • Arthritis    • Cerebral infarction (CMS/HCC)    • Dementia in other diseases classified elsewhere without behavioral disturbance (CMS/HCC)    • Elevated cholesterol    • Gastro-esophageal reflux disease without esophagitis    • Hemiplegia and hemiparesis following cerebral infarction affecting right dominant side (CMS/HCC)    • Hyperkalemia    • Hyperlipidemia    • Hypertension    • Long term (current) use of insulin (CMS/HCC)    • Other recurrent depressive disorders  (CMS/HCC)    • Other specified hypothyroidism    • Pain, unspecified    • Stroke (CMS/HCC)    • Type 2 diabetes mellitus with hyperglycemia (CMS/HCC)        Allergies   Allergen Reactions   • Sulfa Antibiotics Unknown - High Severity   • Tetanus Toxoids Unknown - High Severity       Past Surgical History:   Procedure Laterality Date   • BACK SURGERY         History reviewed. No pertinent family history.    Social History     Socioeconomic History   • Marital status:      Spouse name: Not on file   • Number of children: Not on file   • Years of education: Not on file   • Highest education level: Not on file   Tobacco Use   • Smoking status: Never Smoker   • Smokeless tobacco: Never Used   Vaping Use   • Vaping Use: Never used   Substance and Sexual Activity   • Alcohol use: Not Currently   • Drug use: Not Currently   • Sexual activity: Defer           Objective   Physical Exam  Constitutional:       General: She is not in acute distress.  HENT:      Head: Normocephalic.   Eyes:      Pupils: Pupils are equal, round, and reactive to light.   Cardiovascular:      Rate and Rhythm: Normal rate and regular rhythm.      Heart sounds: No murmur heard.     Pulmonary:      Effort: Pulmonary effort is normal.      Breath sounds: Normal breath sounds.   Abdominal:      General: Bowel sounds are normal.      Palpations: Abdomen is soft.   Musculoskeletal:      Cervical back: Neck supple.      Comments: Weakness present on the R from a recent CVA   Skin:     General: Skin is warm and dry.   Neurological:      Mental Status: She is alert.      Comments: She responds to verbal stimuli.  She is aphasic which she has been since her recent CVA         Procedures           ED Course  ED Course as of Mar 29 0927   Mon Mar 22, 2021   2110 The patient's history, assessment and testing results reviewed with Dr Oconnell.  She will be dc'd back to the nursing home.  I called her son, Nick Baig [phone]- and left a message for  him to call me.  The staff is aware of transfer back to the nursing home.    [KS]      ED Course User Index  [KS] Mckayla Hart, ROMAIN                                           MDM  Number of Diagnoses or Management Options     Amount and/or Complexity of Data Reviewed  Clinical lab tests: ordered and reviewed  Tests in the radiology section of CPT®: ordered and reviewed  Decide to obtain previous medical records or to obtain history from someone other than the patient: yes  Discuss the patient with other providers: yes  Independent visualization of images, tracings, or specimens: yes    Risk of Complications, Morbidity, and/or Mortality  Presenting problems: low  Diagnostic procedures: minimal  Management options: minimal    Patient Progress  Patient progress: stable      Final diagnoses:   Cerebrovascular accident (CVA), unspecified mechanism (CMS/HCC)   Altered mental status, unspecified altered mental status type       ED Disposition  ED Disposition     ED Disposition Condition Comment    Discharge Stable           Provider, No Known  [facility] [id number]  [phone]    Schedule an appointment as soon as possible for a visit in 1 day  Routine ED follow up         Medication List      No changes were made to your prescriptions during this visit.          Mckayla Hart, ROMAIN  03/29/21 1031

## 2021-03-23 LAB
BACTERIA SPEC AEROBE CULT: ABNORMAL
BACTERIA SPEC AEROBE CULT: ABNORMAL
QT INTERVAL: 310 MS
QTC INTERVAL: 344 MS

## 2021-03-23 NOTE — DISCHARGE INSTRUCTIONS
Encourage oral intake.  Continue routine medication.  Follow up with Pcp - call for appointment. Return to ED if condition does not improve or worsens

## 2021-10-19 NOTE — ED NOTES
Blood glucose 382 mg/dl     Dereje Mario, RN  03/12/21 2041     PA Initiation    Medication: Remicade - Rx Benefit  Insurance Company: [employer] ([facility]) - Phone [phone] Fax [phone]  Pharmacy Filling the Rx:    Filling Pharmacy Phone:    Filling Pharmacy Fax:    Start Date: 10/19/2021

## 2022-01-25 NOTE — H&P
[facility]  HISTORY AND PHYSICAL    Date of Admission: 3/12/2021  Primary Care Physician: Provider, No Known    Subjective     Chief Complaint: Change in mental status/stroke    History of Present Illness  Patient currently unable to provide any history.  History obtained via ER signout and some documentation from care everywhere.  Patient with history of diabetes, hypertension, hyperlipidemia, dementia.  She also has a history of prior stroke.  Reportedly she was recently hospitalized at a hospital in [place] for stroke and then sent to skilled nursing facility on 3/9.  Today around noon time she reportedly was found in her room not talking with right-sided paralysis.  Per facility these were different deficits than prior.  She was last known normal before midnight.  ? 8 PM.  On arrival here her initial head CT was nonacute.  Her vitals were normal.  No arrhythmias.  No signs of infection.  Due to timeline of events a code stroke was not called in medicine team was asked to admit the patient for ongoing work-up.  When I went to evaluate her around 5:30 PM she was lying in bed appeared comfortable.  She was difficult to awaken but with sternal rub eventually was able to get her to open her eyes.  She stared off to the left with right hemineglect.  She would not move her right side at all.  She was not following any commands.  Per nursing staff she was following commands earlier and able to do stroke scales.  She was lifting her left leg.  None of this is occurring now.  Patient is a full code per documentation from nursing home.       Review of Systems   Unable to obtain due to patient factors    Past Medical History:   Past Medical History:   Diagnosis Date   • Altered mental status    • Alzheimer's disease (CMS/HCC)    • Cerebral infarction (CMS/HCC)    • Dementia in other diseases classified elsewhere without behavioral disturbance (CMS/HCC)    •  "Gastro-esophageal reflux disease without esophagitis    • Hemiplegia and hemiparesis following cerebral infarction affecting right dominant side (CMS/HCC)    • Hyperkalemia    • Long term (current) use of insulin (CMS/HCC)    • Other recurrent depressive disorders (CMS/[facility])    • Other specified hypothyroidism    • Pain, unspecified    • Type 2 diabetes mellitus with hyperglycemia (CMS/[facility])      Past Surgical History:  Unable to obtain at this time due to patient factors.    Social History:   Unable to obtain at this time due to patient factors.    Family History:   No history in Care Everywhere.  Unable to obtain from patient at this time.    Allergies:  Allergies   Allergen Reactions   • Sulfa Antibiotics Unknown - High Severity   • Tetanus Toxoids Unknown - High Severity     Medications:  Prior to Admission medications    Not on File     Objective     Vital Signs: /68   Pulse 66   Temp 98.7 °F (37.1 °C) (Oral)   Resp 18   Ht 172.7 cm (68\")   Wt 98.9 kg (218 lb 1.6 oz)   SpO2 96%   BMI 33.16 kg/m²   Physical Exam   GEN: lethargic, poor mentation, able to be awoken with sternal rub, non-verbal, not following commands  HEENT: PERRLA, Anicteric, MMD, Trachea midline  Lungs: CTAB, no wheezing/rales/rhonchi  Heart: RRR, +S1/s2, no rub  ABD: obese, soft, +BS  Extremities: no cyanosis, no edema  Skin: no petechiae   Neuro: Patient with right hemineglect.  Withdraws to pain.  Not following commands.  Difficult to fully assess this time.  Psych: Unable to assess at this time        Results Reviewed:  Lab Results (last 24 hours)     Procedure Component Value Units Date/Time    COVID PRE-OP / PRE-PROCEDURE SCREENING ORDER (NO ISOLATION) - Swab, Nasal Cavity [[id number]] Collected: 03/12/21 1638    Specimen: Swab from Nasal Cavity Updated: 03/12/21 1653    Narrative:      The following orders were created for panel order COVID PRE-OP / PRE-PROCEDURE SCREENING ORDER (NO ISOLATION) - Swab, Nasal Cavity.  Procedure    "                            Abnormality         Status                     ---------                               -----------         ------                     COVID-19,[facility] IN-RICHELLE...[[id number]]                      In process                   Please view results for these tests on the individual orders.    COVID-19,[facility] IN-HOUSE,Nasal Swab No Transport Media 3-4 HR TAT - Swab, Nasal Cavity [[id number]] Collected: 03/12/21 1638    Specimen: Swab from Nasal Cavity Updated: 03/12/21 1653    Comprehensive Metabolic Panel [[id number]]  (Abnormal) Collected: 03/12/21 1410    Specimen: Blood Updated: 03/12/21 1505     Glucose 335 mg/dL      BUN 29 mg/dL      Creatinine 1.06 mg/dL      Sodium 137 mmol/L      Potassium 5.2 mmol/L      Chloride 100 mmol/L      CO2 28.0 mmol/L      Calcium 9.2 mg/dL      Total Protein 6.9 g/dL      Albumin 2.90 g/dL      ALT (SGPT) 39 U/L      AST (SGOT) 33 U/L      Alkaline Phosphatase 107 U/L      Total Bilirubin 0.4 mg/dL      eGFR Non African Amer 50 mL/min/1.73      Globulin 4.0 gm/dL      A/G Ratio 0.7 g/dL      BUN/Creatinine Ratio 27.4     Anion Gap 9.0 mmol/L     Narrative:      GFR Normal >60  Chronic Kidney Disease <60  Kidney Failure <15      Lactic Acid, Plasma [[id number]]  (Normal) Collected: 03/12/21 1432    Specimen: Blood Updated: 03/12/21 1501     Lactate 2.0 mmol/L     Magnesium [[id number]]  (Normal) Collected: 03/12/21 1410    Specimen: Blood Updated: 03/12/21 1500     Magnesium 2.2 mg/dL     aPTT [[id number]]  (Abnormal) Collected: 03/12/21 1410    Specimen: Blood Updated: 03/12/21 1455     PTT 39.4 seconds     Protime-INR [[id number]]  (Normal) Collected: 03/12/21 1410    Specimen: Blood Updated: 03/12/21 1455     Protime 13.4 Seconds      INR 1.06    Blood Culture - Blood, Arm, Right [[id number]] Collected: 03/12/21 1434    Specimen: Blood from Arm, Right Updated: 03/12/21 1451    Blood Culture - Blood, Arm, Left [[id number]] Collected: 03/12/21 1414    Specimen:  Blood from Arm, Left Updated: 03/12/21 1451    Urinalysis With Culture If Indicated - Urine, Catheter In/Out [[id number]]  (Abnormal) Collected: 03/12/21 1433    Specimen: Urine, Catheter In/Out Updated: 03/12/21 1447     Color, UA Dark Yellow     Appearance, UA Clear     pH, UA 5.5     Specific Gravity, UA 1.024     Glucose,  mg/dL (1+)     Ketones, UA Negative     Bilirubin, UA Negative     Blood, UA Negative     Protein, UA 30 mg/dL (1+)     Leuk Esterase, UA Trace     Nitrite, UA Negative     Urobilinogen, UA 1.0 E.U./dL    Urinalysis, Microscopic Only - Urine, Catheter In/Out [[id number]]  (Abnormal) Collected: 03/12/21 1433    Specimen: Urine, Catheter In/Out Updated: 03/12/21 1447     RBC, UA 3-5 /HPF      WBC, UA 3-5 /HPF      Bacteria, UA None Seen /HPF      Squamous Epithelial Cells, UA 0-2 /HPF      Hyaline Casts, UA 3-6 /LPF      Methodology Automated Microscopy    CBC & Differential [[id number]]  (Abnormal) Collected: 03/12/21 1410    Specimen: Blood Updated: 03/12/21 1445    Narrative:      The following orders were created for panel order CBC & Differential.  Procedure                               Abnormality         Status                     ---------                               -----------         ------                     CBC Auto Differential[[id number]]        Abnormal            Final result                 Please view results for these tests on the individual orders.    CBC Auto Differential [[id number]]  (Abnormal) Collected: 03/12/21 1410    Specimen: Blood Updated: 03/12/21 1445     WBC 11.73 10*3/mm3      RBC 4.62 10*6/mm3      Hemoglobin 12.9 g/dL      Hematocrit 40.6 %      MCV 87.9 fL      MCH 27.9 pg      MCHC 31.8 g/dL      RDW 13.5 %      RDW-SD 43.8 fl      MPV 11.4 fL      Platelets 349 10*3/mm3      Neutrophil % 79.6 %      Lymphocyte % 11.3 %      Monocyte % 6.0 %      Eosinophil % 1.0 %      Basophil % 0.4 %      Immature Grans % 1.7 %      Neutrophils, Absolute 9.33  10*3/mm3      Lymphocytes, Absolute 1.33 10*3/mm3      Monocytes, Absolute 0.70 10*3/mm3      Eosinophils, Absolute 0.12 10*3/mm3      Basophils, Absolute 0.05 10*3/mm3      Immature Grans, Absolute 0.20 10*3/mm3      nRBC 0.0 /100 WBC         Imaging Results (Last 24 Hours)     Procedure Component Value Units Date/Time    XR Abdomen KUB [[id number]] Collected: 03/12/21 1725     Updated: 03/12/21 1729    Narrative:      EXAMINATION:  XR ABDOMEN KUB-  3/12/2021 5:13 PM CST     HISTORY: MRI; I63.9-Cerebral infarction, unspecified.     COMPARISON: No comparison study.     TECHNIQUE: Supine abdomen.      FINDINGS:   There has been prior posterior instrumented fusion in the  lumbar region. There is scoliosis and degenerative change of the spine.  There has been prior left hip arthroplasty. The bowel gas pattern is  normal. There is no organomegaly or mass effect.       Impression:      No acute findings.        This report was finalized on 03/12/2021 17:26 by Dr. Shaq Kumar MD.    CT Head Without Contrast [[id number]] Collected: 03/12/21 1538     Updated: 03/12/21 1548    Narrative:      Exam: CT HEAD WO CONTRAST-     Indication: Neuro deficit, acute, stroke suspected     Comparison: None     Dose length product: 1887 mGy cm. Automated exposure control was also  utilized to decrease patient radiation dose.     Findings:     No evidence of intracranial hemorrhage. Gray-white differentiation is  maintained. Chronic microvascular ischemic white matter change. Old  lacunar infarct in the LEFT caudate. Focal hypodensity in the  periventricular and subcortical LEFT frontal white matter on image 22  also favored to be related to old chronic microvascular ischemia. No  midline shift or mass effect. Lateral ventricles are nondilated. Basilar  cisterns are patent. Global cerebral volume loss is noted. Vascular  calcification. No acute orbital finding. Bilateral small mastoid  effusions. Paranasal sinuses are clear. No acute  osseous findings.       Impression:      Impression:     1.  No acute intracranial findings.  2.  Chronic microvascular ischemic white matter change and old appearing  LEFT caudate lacunar infarct.  3.  Recommend brain MRI if there is clinical concern for acute ischemia.  4.  Small mastoid effusions.  This report was finalized on 03/12/2021 15:45 by Dr. Rogerio Jaimes MD.    XR Forearm 2 View Left [[id number]] Collected: 03/12/21 1544     Updated: 03/12/21 1548    Narrative:      Left forearm, 2 views 3/12/2021 3:19 PM CST     HISTORY: pain     COMPARISON: NONE     FINDINGS:  Frontal and lateral radiographs of the left forearm were obtained.     There is no evidence of fracture. First carpometacarpal, triscaphe and  radiocarpal joint osteoarthritis changes. Additional osteoarthritis  changes at the elbow joint. Nonspecific soft tissue swelling along the  dorsal aspect of the forearm, especially along the more proximal aspect.       Impression:      1. Nonspecific soft tissue swelling along the more proximal aspect of  the dorsal forearm. No underlying bony abnormality.  2. 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     This report was finalized on 03/12/2021 15:45 by Dr Vinod Conner, .    XR Humerus Left [[id number]] Collected: 03/12/21 1542     Updated: 03/12/21 1547    Narrative:      Left humerus, 2 views 3/12/2021 3:19 PM CST     HISTORY: pain     COMPARISON: NONE     FINDINGS:  Frontal and lateral radiographs of the left humerus were obtained.     Left shoulder arthroplasty. No periprosthetic fracture. In general, no  fractures seen. 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 No gross  soft tissue abnormality.       Impression:      1. Left shoulder arthroplasty. No hardware complication. No acute  fracture.  2. 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     This report was finalized on 03/12/2021 15:44 by Dr Vinod Conner, .    XR Chest 1 View [[id number]] Collected: 03/12/21 1539     Updated:  03/12/21 1542    Narrative:      Frontal upright radiograph of the chest 3/12/2021 3:19 PM CST     HISTORY: Weakness     COMPARISON: None.     FINDINGS:      No lung consolidation. No pleural effusion or pneumothorax. The  cardiomediastinal silhouette and pulmonary vascularity are within normal  limits. Left shoulder arthroplasty. No acute osseous injury.       Impression:      1. No radiographic evidence of acute cardiopulmonary process.        This report was finalized on 03/12/2021 15:39 by Dr Vinod Conner, .        I have personally reviewed and interpreted the radiology studies and ECG obtained at time of admission.     Assessment / Plan     Assessment:   Active Hospital Problems    Diagnosis    • **Acute CVA (cerebrovascular accident) (CMS/[facility])    • Benign essential HTN    • Hyperlipidemia    • DM2 (diabetes mellitus, type 2) (CMS/[facility])    • Altered mental status          Plan:   #1 acute CVA -patient has a history of prior CVA with some reported deficits but unclear what at this time.  We will need to try to get in touch with family but per nursing home she had an acute change neurologically which is why they sent her.  Clinically right now on evaluation she has a low GCS and appears worse than she was in the ER earlier per notes.  She is protecting her airway.  96% on room air with normal respiratory effort and no agonal breaths.  We will repeat a stat head CT without now.  Plan for an MRI of the brain. NICS.  2D echo.  Patient will be n.p.o. at this time.  Will get neurology consult.  Case discussed with Dr. Angel.    #2 altered mental status -some reported history of dementia.  Unclear what her baseline is.  Again appears to be an acute change.  No signs of infection.  Likely due to above.  Supportive care.  Admit to ICU as she is a full code with low GCS.    #3 essential hypertension -on no meds at this time.  ER is trying to obtain med list from nursing home.  Either way will allow permissive  hypertension.  As needed IV labetalol for blood pressure greater than 220.    #4 hyperlipidemia -check a lipid panel in the a.m.  Statin when able to take p.o.    #5 DM 2 -again need to get med rec.  Currently sugars greater than 300.  Will start Lantus 10 units tonight.  Will start sliding scale insulin.  Hypoglycemia protocol.  POCT's every 6.    Admit to ICU.    SCDs for DVT prophylaxis.    Code Status: Full code per documentation.  Need to get hold of family.     I discussed the patient's findings and my recommendations with the staff.  Again will try to reach out to her  and son.    Estimated length of stay 2+ days.    Patient seen and examined by me on 3/12/2021 at 5:35 PM.    Electronically signed by Jose Antonio Hi DO, 03/12/21, 17:57 CST.               No